# Patient Record
Sex: MALE | Race: WHITE | NOT HISPANIC OR LATINO | Employment: FULL TIME | URBAN - METROPOLITAN AREA
[De-identification: names, ages, dates, MRNs, and addresses within clinical notes are randomized per-mention and may not be internally consistent; named-entity substitution may affect disease eponyms.]

---

## 2019-07-11 ENCOUNTER — TELEPHONE (OUTPATIENT)
Dept: INFECTIOUS DISEASES | Facility: CLINIC | Age: 39
End: 2019-07-11

## 2019-07-11 ENCOUNTER — HOSPITAL ENCOUNTER (EMERGENCY)
Facility: HOSPITAL | Age: 39
Discharge: HOME | End: 2019-07-11
Attending: EMERGENCY MEDICINE
Payer: COMMERCIAL

## 2019-07-11 VITALS
BODY MASS INDEX: 25.77 KG/M2 | TEMPERATURE: 99 F | HEART RATE: 85 BPM | WEIGHT: 180 LBS | OXYGEN SATURATION: 99 % | SYSTOLIC BLOOD PRESSURE: 123 MMHG | HEIGHT: 70 IN | RESPIRATION RATE: 16 BRPM | DIASTOLIC BLOOD PRESSURE: 62 MMHG

## 2019-07-11 DIAGNOSIS — B34.9 VIRAL SYNDROME: Primary | ICD-10-CM

## 2019-07-11 DIAGNOSIS — R53.81 MALAISE: ICD-10-CM

## 2019-07-11 DIAGNOSIS — R05.9 COUGH: ICD-10-CM

## 2019-07-11 DIAGNOSIS — R25.2 MUSCLE CRAMPS: ICD-10-CM

## 2019-07-11 LAB
ALBUMIN SERPL BCP-MCNC: 4.5 G/DL (ref 3.5–5.2)
ALP SERPL-CCNC: 90 U/L (ref 55–135)
ALT SERPL W/O P-5'-P-CCNC: 43 U/L (ref 10–44)
ANION GAP SERPL CALC-SCNC: 10 MMOL/L (ref 8–16)
AST SERPL-CCNC: 38 U/L (ref 10–40)
BASOPHILS # BLD AUTO: 0.04 K/UL (ref 0–0.2)
BASOPHILS NFR BLD: 0.6 % (ref 0–1.9)
BILIRUB SERPL-MCNC: 0.5 MG/DL (ref 0.1–1)
BILIRUB UR QL STRIP: NEGATIVE
BUN SERPL-MCNC: 8 MG/DL (ref 6–20)
CALCIUM SERPL-MCNC: 9.2 MG/DL (ref 8.7–10.5)
CHLORIDE SERPL-SCNC: 103 MMOL/L (ref 95–110)
CK SERPL-CCNC: 109 U/L (ref 20–200)
CLARITY UR REFRACT.AUTO: CLEAR
CO2 SERPL-SCNC: 29 MMOL/L (ref 23–29)
COLOR UR AUTO: YELLOW
CREAT SERPL-MCNC: 0.9 MG/DL (ref 0.5–1.4)
DIFFERENTIAL METHOD: ABNORMAL
EOSINOPHIL # BLD AUTO: 0.1 K/UL (ref 0–0.5)
EOSINOPHIL NFR BLD: 0.8 % (ref 0–8)
ERYTHROCYTE [DISTWIDTH] IN BLOOD BY AUTOMATED COUNT: 11.9 % (ref 11.5–14.5)
EST. GFR  (AFRICAN AMERICAN): >60 ML/MIN/1.73 M^2
EST. GFR  (NON AFRICAN AMERICAN): >60 ML/MIN/1.73 M^2
GLUCOSE SERPL-MCNC: 104 MG/DL (ref 70–110)
GLUCOSE UR QL STRIP: NEGATIVE
HCT VFR BLD AUTO: 43 % (ref 40–54)
HGB BLD-MCNC: 14 G/DL (ref 14–18)
HGB UR QL STRIP: NEGATIVE
IMM GRANULOCYTES # BLD AUTO: 0.01 K/UL (ref 0–0.04)
IMM GRANULOCYTES NFR BLD AUTO: 0.2 % (ref 0–0.5)
KETONES UR QL STRIP: ABNORMAL
LEUKOCYTE ESTERASE UR QL STRIP: NEGATIVE
LYMPHOCYTES # BLD AUTO: 3.9 K/UL (ref 1–4.8)
LYMPHOCYTES NFR BLD: 61.4 % (ref 18–48)
MCH RBC QN AUTO: 27.7 PG (ref 27–31)
MCHC RBC AUTO-ENTMCNC: 32.6 G/DL (ref 32–36)
MCV RBC AUTO: 85 FL (ref 82–98)
MONOCYTES # BLD AUTO: 0.6 K/UL (ref 0.3–1)
MONOCYTES NFR BLD: 8.9 % (ref 4–15)
NEUTROPHILS # BLD AUTO: 1.8 K/UL (ref 1.8–7.7)
NEUTROPHILS NFR BLD: 28.1 % (ref 38–73)
NITRITE UR QL STRIP: NEGATIVE
NRBC BLD-RTO: 0 /100 WBC
PH UR STRIP: 5 [PH] (ref 5–8)
PLATELET # BLD AUTO: 138 K/UL (ref 150–350)
PMV BLD AUTO: 10.9 FL (ref 9.2–12.9)
POTASSIUM SERPL-SCNC: 3.9 MMOL/L (ref 3.5–5.1)
PROT SERPL-MCNC: 7.7 G/DL (ref 6–8.4)
PROT UR QL STRIP: NEGATIVE
RBC # BLD AUTO: 5.06 M/UL (ref 4.6–6.2)
SODIUM SERPL-SCNC: 142 MMOL/L (ref 136–145)
SP GR UR STRIP: 1.01 (ref 1–1.03)
URN SPEC COLLECT METH UR: ABNORMAL
WBC # BLD AUTO: 6.37 K/UL (ref 3.9–12.7)

## 2019-07-11 PROCEDURE — 85025 COMPLETE CBC W/AUTO DIFF WBC: CPT

## 2019-07-11 PROCEDURE — 82550 ASSAY OF CK (CPK): CPT

## 2019-07-11 PROCEDURE — 96374 THER/PROPH/DIAG INJ IV PUSH: CPT

## 2019-07-11 PROCEDURE — 93010 EKG 12-LEAD: ICD-10-PCS | Mod: ,,, | Performed by: INTERNAL MEDICINE

## 2019-07-11 PROCEDURE — 99285 EMERGENCY DEPT VISIT HI MDM: CPT | Mod: 25

## 2019-07-11 PROCEDURE — 25000003 PHARM REV CODE 250: Performed by: EMERGENCY MEDICINE

## 2019-07-11 PROCEDURE — 81003 URINALYSIS AUTO W/O SCOPE: CPT

## 2019-07-11 PROCEDURE — 96361 HYDRATE IV INFUSION ADD-ON: CPT

## 2019-07-11 PROCEDURE — 99284 PR EMERGENCY DEPT VISIT,LEVEL IV: ICD-10-PCS | Mod: ,,, | Performed by: EMERGENCY MEDICINE

## 2019-07-11 PROCEDURE — 93010 ELECTROCARDIOGRAM REPORT: CPT | Mod: ,,, | Performed by: INTERNAL MEDICINE

## 2019-07-11 PROCEDURE — 63600175 PHARM REV CODE 636 W HCPCS: Performed by: EMERGENCY MEDICINE

## 2019-07-11 PROCEDURE — 93005 ELECTROCARDIOGRAM TRACING: CPT

## 2019-07-11 PROCEDURE — 80053 COMPREHEN METABOLIC PANEL: CPT

## 2019-07-11 PROCEDURE — 99284 EMERGENCY DEPT VISIT MOD MDM: CPT | Mod: ,,, | Performed by: EMERGENCY MEDICINE

## 2019-07-11 RX ORDER — ONDANSETRON 2 MG/ML
4 INJECTION INTRAMUSCULAR; INTRAVENOUS
Status: COMPLETED | OUTPATIENT
Start: 2019-07-11 | End: 2019-07-11

## 2019-07-11 RX ADMIN — SODIUM CHLORIDE, SODIUM LACTATE, POTASSIUM CHLORIDE, AND CALCIUM CHLORIDE 1000 ML: .6; .31; .03; .02 INJECTION, SOLUTION INTRAVENOUS at 06:07

## 2019-07-11 RX ADMIN — ONDANSETRON 4 MG: 2 INJECTION INTRAMUSCULAR; INTRAVENOUS at 06:07

## 2019-07-11 NOTE — ED PROVIDER NOTES
Encounter Date: 7/11/2019       History     Chief Complaint   Patient presents with    Muscle Pain     bilateral leg muscle pain x 1 wk, today began to have muscle aching in upper back and neck. denies trauma     HPI   37 Y/O healthy male reports 1 week of general malaise, myalgias, which started to bilateral lower extremities and gradually included back and neck muscles, intermittent nonradiating frontal headaches, nasal congestion (left more than right) and nonproductive cough.  No reported sore throat, ear pain or sick contacts reported.  He denies visual changes, dizziness, if the lateral numbness or weakness, chest pain, chest pain exertion, dyspnea, dyspnea on exertion, orthopnea, or decreased exercise tolerance.  He does splinting time between normal lungs and [facility] and reports cutting the grass 7-10 days ago, but denies any insect bites or rashes. He does report 48 hr of nonbloody nonmucous loose stools versus diarrhea with no associated abdominal pain. He denies excessive flatus, abdominal distension or emesis despite slight nausea.  He has been able to tolerate p.o. despite decreased appetite in the last for 5 days.    Review of patient's allergies indicates:  No Known Allergies  History reviewed. No pertinent past medical history.  History reviewed. No pertinent surgical history.  History reviewed. No pertinent family history.  Social History     Tobacco Use    Smoking status: Never Smoker   Substance Use Topics    Alcohol use: Yes    Drug use: Never     Review of Systems  CONST: + subjective intermittent fever (T-max 100.7°), general malaise and myalgias specifically to bilateral calf muscles back and neck, which is described as worse when he feels his fevers; no chills, weight change, or fatigue.    HEENT: + nasal congestion (presents to left knee are my evaluation); + intermittent headache (none present on my evaluation), no blurry vision/change in vision, sore throat, ear  pain, eye pain, otorrhea, tooth pain, swelling, or voice changes.  NECK: No pain, masses, trauma, or redness.  HEART: No pain, palpitations, diaphoresis, nausea, or vomiting  LUNG: + dry cough; No SOB, orthopnea, TORRES or other complaints.  ABDOMEN: + nausea and nonbloody nonmucous loose stools versus diarrhea (3-4 episodes in last 2-3 days); No pain, vomiting, or flank pain  : + scrotal ache this morning; No discharge, dysuria, lesions, rashes, masses, sores  EXTREMITIES: FROM with No swelling, redness, injuries/trauma, lesions, sores, weakness, numbness, or tingling  NEURO: No dizziness, weakness, fatigue, tremors, headache, change in vision or disturbances of balance or coordination  SKIN: No insect bites, lesions, rashes, trauma or other complaints    Physical Exam     Initial Vitals [07/11/19 1628]   BP Pulse Resp Temp SpO2   126/64 91 18 98.8 °F (37.1 °C) 97 %      MAP       --         Physical Exam   PHYSICAL EXAM:  GENERAL: Calm; Cooperative; Well-appearing and Non-Toxic; Well-Nourished; NAD.  HEENT: AT/NC; anicteric sclera; PERRL, EOMI, Acuity & Fields Grossly Intact; speaking full sentences with no slurring of speech or drooling/inability to tolerate oral secretions.  NECK: Supple, FROM with no meningismus, no accessory muscle use.  HEART: Regular rate and rhythm, no M/G/T.  LUNGS: No Tachypnea, No Retractions, and CTA B/L with no W/R/R.  ABDOMEN/:  No scar; +BS, Soft, ND, NTTP. No rigidity. No guarding. NEG Emerson's, Rovsing's, or McBurney's point tenderness. + cervical in size penis with no urethral discharge; no scrotal erythema or edema with no testicular tenderness to palpation, which were vertically lying and nonedematous.  BACK: Atraumatic, No midline TTP to C/T/LS spine; No CVA tenderness B/L.  EXTREMITIES: FROM. Strength 5/5. Symmetrical Sensorium and with no deficits. Soft Comparments.  SKIN: Warm, Dry, No Skin Tears or Rashes.  VASCULAR: 2+ pulses Prox/Dist & Symmetrical with No  delay.  NEUROLOGIC: AAOx3; answering questions appropriate; no receptive or expressive aphasia; no focal neurological deficits; no resting or intentional tremors    ED Course   Procedures  Labs Reviewed   CBC W/ AUTO DIFFERENTIAL   COMPREHENSIVE METABOLIC PANEL   CK     EKG Readings: (Independently Interpreted)   Sinus rhythm at a rate of 91 beats per minute; normal ventricular axis; VT/QRS/QTC intervals within normal limits; appropriate R-wave progression with no STEMI.  ____________________  Wilder Link MD, [facility]  Emergency Medicine Staff  6:00 PM 7/11/2019         Imaging Results    None          Medical Decision Making:   History:   Old Medical Records: I decided to obtain old medical records.  Initial Assessment:   Afebrile, atraumatic and hemodynamically stable health female presents with nonspecific complaints for about 1 week, including general malaise, muscle cramps, fatigue, cough, and intermittent non persistent headaches with no associated visual changes/loss or focal numbness/weakness.  On exam patient is nontoxic, with no meningismus, no active headache and neurologically intact. Abdomen is benign and lungs clear to auscultation bilaterally. No recent travel reported.  HEENT within normal limits with no appreciated, cervical, preauricular postauricular occipital submandibular or so anterior cervical lymphadenopathy. Will place on a gown check from head to toe given his significant concern for Lyme disease.  Will additionally examined for anterior femoral lymphadenopathy and evaluate scrotum as he reports slight discomfort despite his reported being monogamous no high risk sexual encounter.  ____________________  Wilder Link MD, [facility]  Emergency Medicine Staff  5:51 PM 7/11/2019    Head to toe skin exam revealed no tick bites, or any evidence of hyperemia, maculopapular rashes, vesicular rashes, abrasions, lacerations or any evidence of erythema migrans that would indicate Lyme disease as an etiology  to patient's symptoms.  Given patient's nonspecific complaints of fever and intermittent headaches without recollection of any insect bite Robert Mountain spotted fever was also considered as part of differential diagnosis, however, as reported above no evidence of maculopapular rash reported or appreciated on my examination. Additionally, no inguinal lymphadenopathy was palpated on exam.  Note penile lesions or urethral discharge appreciated to circumcised shaft with no scrotal erythema, edema and no testicular tenderness to palpation, which were both vertically lying with intact cremasteric reflex.  ____________________  Wilder Link MD, [facility]  Emergency Medicine Staff  6:05 PM 7/11/2019    Clinical Tests:   Lab Tests: Ordered  Radiological Study: Ordered    STAFF ATTENDING PHYSICIAN F/U NOTE:  Dae Amador has been evaluated and treated. He reports complete resolution of Sx and is ready to return home. Currently patient reports no new complaints. We discussed Sx warranting immediate ED return, which were acknowledged. I recommended F/U and discussion of visit with primary care physician.  ____________________  Wilder Link MD, [facility]  Emergency Medicine Staff  9:00 PM 7/11/2019                      Clinical Impression:       ICD-10-CM ICD-9-CM   1. Viral syndrome B34.9 079.99   2. Cough R05 786.2   3. Malaise R53.81 780.79   4. Muscle cramps R25.2 729.82                                Edin Link MD  07/11/19 0956

## 2019-07-11 NOTE — ED TRIAGE NOTES
"Patient in from home for eval of bilateral muscle pain that first started in his calves and made its way up into his back and neck. Patient reports generalized weakness and states he feels fatigued and "lethargic". Patient reports he feels like his balance is off and he is uncoordinated. Patient denies any other complaints at this time.   "

## 2019-07-11 NOTE — TELEPHONE ENCOUNTER
Request to get pt an ID appt because of concerns about Lyme disease  Will ask dept to schedule  christi

## 2019-07-12 NOTE — DISCHARGE INSTRUCTIONS
X-Ray Chest PA And Lateral (Final result)   Result time 07/11/19 18:26:34   Final result by Daniel Bingham MD (07/11/19 18:26:34)                Impression:      No acute cardiopulmonary process.      Electronically signed by: Daniel Bingham MD  Date: 07/11/2019  Time: 18:26            Narrative:    EXAMINATION:  XR CHEST PA AND LATERAL    CLINICAL HISTORY:  Cough    TECHNIQUE:  PA and lateral views of the chest were performed.    COMPARISON:  None.    FINDINGS:  There is no consolidation, effusion, or pneumothorax.  Cardiomediastinal silhouette is unremarkable.  Regional osseous structures are unremarkable.                  Future Appointments   Date Time Provider Department Center   7/16/2019  1:00 PM Madhav Moody MD [facility] ID [address]

## 2019-07-15 ENCOUNTER — OFFICE VISIT (OUTPATIENT)
Dept: INTERNAL MEDICINE | Facility: CLINIC | Age: 39
End: 2019-07-15
Payer: COMMERCIAL

## 2019-07-15 ENCOUNTER — TELEPHONE (OUTPATIENT)
Dept: INFECTIOUS DISEASES | Facility: CLINIC | Age: 39
End: 2019-07-15

## 2019-07-15 ENCOUNTER — HOSPITAL ENCOUNTER (OUTPATIENT)
Dept: RADIOLOGY | Facility: HOSPITAL | Age: 39
Discharge: HOME | End: 2019-07-15
Attending: INTERNAL MEDICINE
Payer: COMMERCIAL

## 2019-07-15 VITALS
SYSTOLIC BLOOD PRESSURE: 102 MMHG | HEART RATE: 90 BPM | OXYGEN SATURATION: 99 % | BODY MASS INDEX: 25.73 KG/M2 | DIASTOLIC BLOOD PRESSURE: 68 MMHG | TEMPERATURE: 98 F | WEIGHT: 179.69 LBS | HEIGHT: 70 IN

## 2019-07-15 DIAGNOSIS — R11.2 NON-INTRACTABLE VOMITING WITH NAUSEA, UNSPECIFIED VOMITING TYPE: ICD-10-CM

## 2019-07-15 DIAGNOSIS — R19.7 DIARRHEA OF PRESUMED INFECTIOUS ORIGIN: ICD-10-CM

## 2019-07-15 DIAGNOSIS — R11.2 NAUSEA, VOMITING AND DIARRHEA: ICD-10-CM

## 2019-07-15 DIAGNOSIS — R11.2 NON-INTRACTABLE VOMITING WITH NAUSEA, UNSPECIFIED VOMITING TYPE: Primary | ICD-10-CM

## 2019-07-15 DIAGNOSIS — R42 ORTHOSTATIC LIGHTHEADEDNESS: ICD-10-CM

## 2019-07-15 DIAGNOSIS — R19.7 NAUSEA, VOMITING AND DIARRHEA: ICD-10-CM

## 2019-07-15 DIAGNOSIS — M79.10 MYALGIA: ICD-10-CM

## 2019-07-15 DIAGNOSIS — R50.9 FEVER, UNSPECIFIED FEVER CAUSE: ICD-10-CM

## 2019-07-15 PROCEDURE — 74177 CT ABD & PELVIS W/CONTRAST: CPT | Mod: 26,,, | Performed by: RADIOLOGY

## 2019-07-15 PROCEDURE — 74177 CT ABDOMEN PELVIS WITH CONTRAST: ICD-10-PCS | Mod: 26,,, | Performed by: RADIOLOGY

## 2019-07-15 PROCEDURE — 99999 PR PBB SHADOW E&M-EST. PATIENT-LVL IV: ICD-10-PCS | Mod: PBBFAC,,, | Performed by: INTERNAL MEDICINE

## 2019-07-15 PROCEDURE — 25500020 PHARM REV CODE 255: Performed by: INTERNAL MEDICINE

## 2019-07-15 PROCEDURE — 99214 OFFICE O/P EST MOD 30 MIN: CPT | Mod: S$GLB,,, | Performed by: INTERNAL MEDICINE

## 2019-07-15 PROCEDURE — 99999 PR PBB SHADOW E&M-EST. PATIENT-LVL IV: CPT | Mod: PBBFAC,,, | Performed by: INTERNAL MEDICINE

## 2019-07-15 PROCEDURE — 99214 PR OFFICE/OUTPT VISIT, EST, LEVL IV, 30-39 MIN: ICD-10-PCS | Mod: S$GLB,,, | Performed by: INTERNAL MEDICINE

## 2019-07-15 PROCEDURE — 74177 CT ABD & PELVIS W/CONTRAST: CPT | Mod: TC

## 2019-07-15 RX ORDER — METRONIDAZOLE 500 MG/1
500 TABLET ORAL EVERY 8 HOURS
Qty: 21 TABLET | Refills: 0 | Status: SHIPPED | OUTPATIENT
Start: 2019-07-15 | End: 2019-07-22

## 2019-07-15 RX ORDER — ONDANSETRON 4 MG/1
4 TABLET, FILM COATED ORAL EVERY 8 HOURS PRN
Qty: 30 TABLET | Refills: 1 | Status: SHIPPED | OUTPATIENT
Start: 2019-07-15 | End: 2020-09-17

## 2019-07-15 RX ORDER — CIPROFLOXACIN 500 MG/1
500 TABLET ORAL EVERY 12 HOURS
Qty: 14 TABLET | Refills: 0 | Status: SHIPPED | OUTPATIENT
Start: 2019-07-15 | End: 2019-07-22 | Stop reason: ALTCHOICE

## 2019-07-15 RX ADMIN — IOHEXOL 15 ML: 350 INJECTION, SOLUTION INTRAVENOUS at 03:07

## 2019-07-15 RX ADMIN — IOHEXOL 100 ML: 350 INJECTION, SOLUTION INTRAVENOUS at 05:07

## 2019-07-15 RX ADMIN — IOHEXOL 15 ML: 350 INJECTION, SOLUTION INTRAVENOUS at 04:07

## 2019-07-15 NOTE — TELEPHONE ENCOUNTER
Called back and spoke with patient's wife. Informed wife if symptoms as severe as not being able to get out of bed then should go to the ED. Patient wife stated they will keep in mind but was able to get appointment with patient PCP today at 3pm and they will go to that appointment and keep the appointment with ID for tomorrow just in case needed.

## 2019-07-15 NOTE — TELEPHONE ENCOUNTER
When calling to confirm appointment for tomorrow patient mother stated patient is very sick and needs to be seen today or earlier tomorrow. He is so sick he can't even get out of bed. Please advise.

## 2019-07-15 NOTE — PROGRESS NOTES
"INTERNAL MEDICINE SAME DAY PRIMARY CARE VISIT NOTE    Subjective:     Chief Complaint: Fatigue; Muscle Pain; Nausea; Diarrhea; and Emesis       Patient ID: Dae Amador is a 38 y.o. male with no significant PMHx, here today for focused same-day primary care visit.    Today, patient with complaint of n/v, diarrhea and weakness.  States sx first started around the 4th of July, had muscle aches and soreness in his calves and shins B.  Took Ibuprofen at that time which helped a little.  Then had sx of neck and back pain c fever ranging from 99 to 101.    For the past week has been having diarrhea described as loose stools.   Non-bloody.    Today started c emesis and nausea.  Today after throwing up, felt like he was about to faint but went to the ground but states he did not lose consciousness.  Denies abd pain.    States he had gone to Urgent Care at [facility] last week but says they did labs but did not give him results and states "they did nothing."    Also c some light and photosensitivity but no headache.    Denies sore throat or cough.  Daughter is 1.6 yo and in  s recent illness.  Denies sick contacts or recent travel.  Has never had anything like this before.    Down 5 lbs in the past 2 weeks.    Says he went to ED here last week and had labs, EKG, cxr, u/a which overall were normal x slightly low plts and slightly elevated lymphocytes.    Past Medical History:  History reviewed. No pertinent past medical history.    Home Medications:  Prior to Admission medications    Not on File       Allergies:  Review of patient's allergies indicates:  No Known Allergies    Social History:  Social History     Tobacco Use    Smoking status: Never Smoker   Substance Use Topics    Alcohol use: Yes    Drug use: Never         Review of Systems   Constitutional: Positive for appetite change, fatigue and fever. Negative for chills.   HENT: Negative for congestion, rhinorrhea and sore throat.    Respiratory: Negative for " "cough, chest tightness and shortness of breath.    Cardiovascular: Negative for chest pain.   Gastrointestinal: Positive for diarrhea, nausea and vomiting. Negative for abdominal pain and blood in stool.   Genitourinary: Negative for dysuria and frequency.   Musculoskeletal: Positive for myalgias and neck pain. Negative for neck stiffness.   Skin: Negative for rash.   Neurological: Positive for weakness and light-headedness.           Objective:   /68 (BP Location: Right arm, Patient Position: Sitting, BP Method: Medium (Manual))   Pulse 90   Temp 98.3 °F (36.8 °C)   Ht 5' 10" (1.778 m)   Wt 81.5 kg (179 lb 10.8 oz)   SpO2 99%   BMI 25.78 kg/m²        General: AAO x3, appears pale and ill-appearing  HEENT: no post nasal drip, no erythema in post pharynx, small cervical LN on L, mobile, non-tender.  CV: RRR, no m/r/g  Pulm: Lungs CTAB, no crackles, no wheezes  Abd: s/NT/ND +BS  Extremities: no c/c/e    Labs:         Assessment/Plan     Dae was seen today for fatigue, muscle pain, nausea, diarrhea and emesis.    Diagnoses and all orders for this visit:    Non-intractable vomiting with nausea, unspecified vomiting type  Diarrhea of presumed infectious origin  Nausea, vomiting and diarrhea  Fever, unspecified fever cause  Sx appear c/w c some type of viral gastroenteritis.  However, based on duration of sx and persistent intermittent fever, will tx c cipro and flagyl p obtaining stool samples today  Will also send for urgent CT abd/pelvis c contrast  Check lytes  zofran for nausea  -     CBC auto differential; Future  -     Comprehensive metabolic panel; Future  -     Hemoglobin A1c; Future  -     Magnesium; Future  -     PHOSPHORUS; Future  -     TSH; Future  -     T4, free; Future  -     Lipase; Future  -     CT Abdomen Pelvis With Contrast; Future  -     ondansetron (ZOFRAN) 4 MG tablet; Take 1 tablet (4 mg total) by mouth every 8 (eight) hours as needed for Nausea.  -     Stool culture; Future  -     " Stool Exam-Ova,Cysts,Parasites; Future  -     WBC, Stool; Future  -     ciprofloxacin HCl (CIPRO) 500 MG tablet; Take 1 tablet (500 mg total) by mouth every 12 (twelve) hours. for 7 days  -     metroNIDAZOLE (FLAGYL) 500 MG tablet; Take 1 tablet (500 mg total) by mouth every 8 (eight) hours for 7 days. Avoid alcohol while taking this medication  -     CT Abdomen Pelvis With Contrast; Future    Myalgia  -     Magnesium; Future  -     PHOSPHORUS; Future  -     Vitamin D; Future    Orthostatic lightheadedness  Discussed to increase hydration, bland diet c clears for now and advance as tolerated.    RTC in 1-2 weeks pending sx and initial w/u, advised to go back to ED if fever persists or intractable n/v.  VSS today.  Miladis Sequeira MD  Department of Internal Medicine - [facility]  07/16/2019

## 2019-07-16 ENCOUNTER — HOSPITAL ENCOUNTER (EMERGENCY)
Facility: HOSPITAL | Age: 39
Discharge: HOME | End: 2019-07-16
Attending: EMERGENCY MEDICINE
Payer: COMMERCIAL

## 2019-07-16 ENCOUNTER — TELEPHONE (OUTPATIENT)
Dept: INTERNAL MEDICINE | Facility: CLINIC | Age: 39
End: 2019-07-16

## 2019-07-16 VITALS
TEMPERATURE: 98 F | SYSTOLIC BLOOD PRESSURE: 129 MMHG | OXYGEN SATURATION: 99 % | WEIGHT: 179 LBS | HEART RATE: 93 BPM | RESPIRATION RATE: 18 BRPM | HEIGHT: 68 IN | DIASTOLIC BLOOD PRESSURE: 58 MMHG | BODY MASS INDEX: 27.13 KG/M2

## 2019-07-16 DIAGNOSIS — R19.7 NAUSEA VOMITING AND DIARRHEA: Primary | ICD-10-CM

## 2019-07-16 DIAGNOSIS — R11.2 NAUSEA VOMITING AND DIARRHEA: Primary | ICD-10-CM

## 2019-07-16 DIAGNOSIS — R42 DIZZINESS: ICD-10-CM

## 2019-07-16 LAB
ALBUMIN SERPL BCP-MCNC: 4 G/DL (ref 3.5–5.2)
ALP SERPL-CCNC: 60 U/L (ref 55–135)
ALT SERPL W/O P-5'-P-CCNC: 79 U/L (ref 10–44)
ANION GAP SERPL CALC-SCNC: 7 MMOL/L (ref 8–16)
AST SERPL-CCNC: 67 U/L (ref 10–40)
BASOPHILS # BLD AUTO: 0.03 K/UL (ref 0–0.2)
BASOPHILS NFR BLD: 0.6 % (ref 0–1.9)
BILIRUB SERPL-MCNC: 0.3 MG/DL (ref 0.1–1)
BUN SERPL-MCNC: 7 MG/DL (ref 6–20)
CALCIUM SERPL-MCNC: 8.5 MG/DL (ref 8.7–10.5)
CHLORIDE SERPL-SCNC: 104 MMOL/L (ref 95–110)
CO2 SERPL-SCNC: 29 MMOL/L (ref 23–29)
CREAT SERPL-MCNC: 1 MG/DL (ref 0.5–1.4)
DIFFERENTIAL METHOD: ABNORMAL
EOSINOPHIL # BLD AUTO: 0 K/UL (ref 0–0.5)
EOSINOPHIL NFR BLD: 0.2 % (ref 0–8)
ERYTHROCYTE [DISTWIDTH] IN BLOOD BY AUTOMATED COUNT: 11.7 % (ref 11.5–14.5)
EST. GFR  (AFRICAN AMERICAN): >60 ML/MIN/1.73 M^2
EST. GFR  (NON AFRICAN AMERICAN): >60 ML/MIN/1.73 M^2
GLUCOSE SERPL-MCNC: 111 MG/DL (ref 70–110)
HCT VFR BLD AUTO: 39.1 % (ref 40–54)
HGB BLD-MCNC: 12.6 G/DL (ref 14–18)
IMM GRANULOCYTES # BLD AUTO: 0.01 K/UL (ref 0–0.04)
IMM GRANULOCYTES NFR BLD AUTO: 0.2 % (ref 0–0.5)
LACTATE SERPL-SCNC: 1.3 MMOL/L (ref 0.5–2.2)
LYMPHOCYTES # BLD AUTO: 2.1 K/UL (ref 1–4.8)
LYMPHOCYTES NFR BLD: 39.3 % (ref 18–48)
MCH RBC QN AUTO: 27.6 PG (ref 27–31)
MCHC RBC AUTO-ENTMCNC: 32.2 G/DL (ref 32–36)
MCV RBC AUTO: 86 FL (ref 82–98)
MONOCYTES # BLD AUTO: 0.6 K/UL (ref 0.3–1)
MONOCYTES NFR BLD: 10.5 % (ref 4–15)
NEUTROPHILS # BLD AUTO: 2.6 K/UL (ref 1.8–7.7)
NEUTROPHILS NFR BLD: 49.2 % (ref 38–73)
NRBC BLD-RTO: 0 /100 WBC
PLATELET # BLD AUTO: 149 K/UL (ref 150–350)
PMV BLD AUTO: 10.7 FL (ref 9.2–12.9)
POCT GLUCOSE: 116 MG/DL (ref 70–110)
POTASSIUM SERPL-SCNC: 3.8 MMOL/L (ref 3.5–5.1)
PROT SERPL-MCNC: 6.7 G/DL (ref 6–8.4)
RBC # BLD AUTO: 4.57 M/UL (ref 4.6–6.2)
SODIUM SERPL-SCNC: 140 MMOL/L (ref 136–145)
WBC # BLD AUTO: 5.22 K/UL (ref 3.9–12.7)

## 2019-07-16 PROCEDURE — 25000003 PHARM REV CODE 250: Performed by: PHYSICIAN ASSISTANT

## 2019-07-16 PROCEDURE — 93010 ELECTROCARDIOGRAM REPORT: CPT | Mod: ,,, | Performed by: INTERNAL MEDICINE

## 2019-07-16 PROCEDURE — 96374 THER/PROPH/DIAG INJ IV PUSH: CPT

## 2019-07-16 PROCEDURE — 93005 ELECTROCARDIOGRAM TRACING: CPT

## 2019-07-16 PROCEDURE — 96361 HYDRATE IV INFUSION ADD-ON: CPT

## 2019-07-16 PROCEDURE — 99284 EMERGENCY DEPT VISIT MOD MDM: CPT | Mod: 25

## 2019-07-16 PROCEDURE — 82962 GLUCOSE BLOOD TEST: CPT

## 2019-07-16 PROCEDURE — 63600175 PHARM REV CODE 636 W HCPCS: Performed by: PHYSICIAN ASSISTANT

## 2019-07-16 PROCEDURE — 99284 PR EMERGENCY DEPT VISIT,LEVEL IV: ICD-10-PCS | Mod: ,,, | Performed by: PHYSICIAN ASSISTANT

## 2019-07-16 PROCEDURE — 85025 COMPLETE CBC W/AUTO DIFF WBC: CPT

## 2019-07-16 PROCEDURE — 93010 EKG 12-LEAD: ICD-10-PCS | Mod: ,,, | Performed by: INTERNAL MEDICINE

## 2019-07-16 PROCEDURE — 80053 COMPREHEN METABOLIC PANEL: CPT

## 2019-07-16 PROCEDURE — 99284 EMERGENCY DEPT VISIT MOD MDM: CPT | Mod: ,,, | Performed by: PHYSICIAN ASSISTANT

## 2019-07-16 PROCEDURE — 87040 BLOOD CULTURE FOR BACTERIA: CPT

## 2019-07-16 PROCEDURE — 83605 ASSAY OF LACTIC ACID: CPT

## 2019-07-16 RX ORDER — ONDANSETRON 2 MG/ML
4 INJECTION INTRAMUSCULAR; INTRAVENOUS
Status: COMPLETED | OUTPATIENT
Start: 2019-07-16 | End: 2019-07-16

## 2019-07-16 RX ORDER — METOCLOPRAMIDE 10 MG/1
10 TABLET ORAL EVERY 6 HOURS PRN
Qty: 14 TABLET | Refills: 0 | Status: SHIPPED | OUTPATIENT
Start: 2019-07-16 | End: 2019-07-22

## 2019-07-16 RX ORDER — METOCLOPRAMIDE 5 MG/1
10 TABLET ORAL
Status: COMPLETED | OUTPATIENT
Start: 2019-07-16 | End: 2019-07-16

## 2019-07-16 RX ADMIN — SODIUM CHLORIDE 1000 ML: 0.9 INJECTION, SOLUTION INTRAVENOUS at 12:07

## 2019-07-16 RX ADMIN — SODIUM CHLORIDE 1000 ML: 0.9 INJECTION, SOLUTION INTRAVENOUS at 10:07

## 2019-07-16 RX ADMIN — METOCLOPRAMIDE HYDROCHLORIDE 10 MG: 5 TABLET ORAL at 11:07

## 2019-07-16 RX ADMIN — ONDANSETRON 4 MG: 2 INJECTION INTRAMUSCULAR; INTRAVENOUS at 10:07

## 2019-07-16 NOTE — ED PROVIDER NOTES
"Encounter Date: 7/16/2019       History     Chief Complaint   Patient presents with    Dizziness     Pt reports was seen at ER thursday for same symptoms. Pt was seen at internal med yesterday. Pt reports feeling dizzy. PT reports vomiting yesterday.      9:54 AM    Patient is a 38-year-old healthy male who presents the ED with dizziness.  Patient states he has not been experiencing lightheadedness and dizziness for the past 2 days.  He states that while in the restroom yesterday, he almost passed out.  He became lightheaded and slowly brought himself down to the ground and immediately lift his legs up.  A similar episode happened again today which prompted him to come into the emergency department.  Patient reports nausea, vomiting, and diarrhea since Saturday, 3 days ago. He reports "a ton" of vomiting, may 20-30 episodes yesterday and 7 episodes of water diarrhea. He denies ever having abdominal pain. He saw his PCP yesterday who order labs, stool culture, and CTAP with contrast and placed patient on cipro and flagyl. He has had intermittent low grad fevers around 99.9 and "little over 100" and has been taking advil and tylenol. He endorses decrease appetite and poor intake.         Review of patient's allergies indicates:  No Known Allergies  No past medical history on file.  No past surgical history on file.  No family history on file.  Social History     Tobacco Use    Smoking status: Never Smoker   Substance Use Topics    Alcohol use: Yes    Drug use: Never     Review of Systems   Constitutional: Positive for appetite change and fever (low grade). Negative for diaphoresis.   HENT: Negative for sore throat.    Eyes: Negative for photophobia.   Respiratory: Negative for cough and shortness of breath.    Cardiovascular: Negative for chest pain.   Gastrointestinal: Positive for diarrhea, nausea and vomiting. Negative for abdominal pain and blood in stool.   Genitourinary: Positive for frequency. Negative for " dysuria and hematuria.   Musculoskeletal: Positive for back pain, myalgias and neck pain.   Skin: Negative for rash.   Neurological: Positive for dizziness.       Physical Exam     Initial Vitals [07/16/19 0848]   BP Pulse Resp Temp SpO2   114/67 96 18 98.1 °F (36.7 °C) 98 %      MAP       --         Physical Exam    Vitals reviewed.  Constitutional: He appears well-developed and well-nourished. He is not diaphoretic. No distress.   HENT:   Head: Normocephalic and atraumatic.   Nose: Nose normal.   Eyes: Conjunctivae and EOM are normal.   Neck: Normal range of motion.   Cardiovascular: Normal rate, regular rhythm and normal heart sounds. Exam reveals no friction rub.    No murmur heard.  Pulmonary/Chest: Breath sounds normal. No respiratory distress. He has no wheezes. He has no rales.   Abdominal: Soft. Bowel sounds are normal. He exhibits no distension and no mass. There is no tenderness. There is no rigidity, no rebound and no guarding.   Musculoskeletal: Normal range of motion.   Neurological: He is alert and oriented to person, place, and time. He has normal strength. No sensory deficit.   Skin: Skin is warm and dry. No erythema.   Psychiatric: He has a normal mood and affect. Thought content normal.         ED Course   Procedures  Labs Reviewed   CBC W/ AUTO DIFFERENTIAL - Abnormal; Notable for the following components:       Result Value    RBC 4.57 (*)     Hemoglobin 12.6 (*)     Hematocrit 39.1 (*)     Platelets 149 (*)     All other components within normal limits   COMPREHENSIVE METABOLIC PANEL - Abnormal; Notable for the following components:    Glucose 111 (*)     Calcium 8.5 (*)     AST 67 (*)     ALT 79 (*)     Anion Gap 7 (*)     All other components within normal limits   POCT GLUCOSE - Abnormal; Notable for the following components:    POCT Glucose 116 (*)     All other components within normal limits   CULTURE, BLOOD   CULTURE, BLOOD   LACTIC ACID, PLASMA        ECG Results          EKG 12-lead  (Final result)  Result time 07/17/19 00:40:24    Final result by Interface, Lab In [facility] (07/17/19 00:40:24)                 Narrative:    Test Reason : R42,    Vent. Rate : 089 BPM     Atrial Rate : 089 BPM     P-R Int : 146 ms          QRS Dur : 086 ms      QT Int : 342 ms       P-R-T Axes : 080 030 063 degrees     QTc Int : 416 ms    Normal sinus rhythm  RSR' pattern in V1 and V2  Nonspecific T wave abnormality  Abnormal ECG  When compared with ECG of 11-JUL-2019 17:57,  RSR' pattern in V1 and V2 New since previous tracing  Confirmed by Ferny Gonzales MD ([id number]) on 7/17/2019 12:40:15 AM    Referred By: [email or web address]   SELF           Confirmed By:Ferny Gonzales MD                            Imaging Results    None          Medical Decision Making:   History:   Old Medical Records: I decided to obtain old medical records.  Old Records Summarized: records from clinic visits and records from previous admission(s).       <> Summary of Records: CTAP with contrast yesterday with PCP showed apparent long segment colonic wall thickening without adjacent inflammation. He was placed on cipro and flagyl.  Initial Assessment:   Patient is a 38-year-old male that presents the ED with nausea, vomiting, and diarrhea for the past 3 days and dizziness and lightheadedness for the past 2 days.  He reports 2 episodes of presyncope.  Differential Diagnosis:   Includes but is not limited to orthostatic hypotension, vasovagal, dehydration, electrolyte abnormalities, gastroenteritis, colitis.  Clinical Tests:   Lab Tests: Ordered and Reviewed  Medical Tests: Reviewed  ED Management:  Will initiate work up, obtain blood cultures given reports of intermittent fevers and frequent anti-pyrectic use, hydrate, and continue to monitor.     ECG with NSR at 89 bpm. No STEMI.  CBC with no leukocytosis. No anemia.   CMP with no significant electrolyte abnormalities besides mild hypocalcemia at 8.5.  Patient being hydrated.  Persistent transaminitis with AST  and ALT at 67 and 79 respectively.  No hyperbilirubinemia.    Lactic acid within normal limits.  Blood cultures pending...    POCT 116.    2:12 PM. Patient reassess. He reports feeling improved after fluids and medication. Although he does note that he feels paresthesias to his bilateral plantar surfaces, which he has had intermittently for the past 1 year. I performed Neuro Exam, which did not reveal any neuro deficits. Patient and mother reassured. He was able to tolerate fluids and soup (brought by mother) by mouth. I will continue to treat patient for gastroenteritis/colitis (had CTAP with contrast yesterday that showed wall thickening of colon). He should continue cipro and flagyl as Rx by his PCP yesterday. I Rx reglan for nausea. Follow up closely with PCP. Strict ED return precaution given, and patient and his mother voiced their understanding.     7/17/19. 8:10 AM. I called patient to update him with BC results thus far which are negative. He reports feeling much improved. He only had a little diarrhea since d/c from ED. He reports increase appetite and has even had some oatmeal this morning.     I have reviewed patient's chart and discussed this case with my supervising MD.     Angela Redding PA-C  Emergent Department  [facility]  [employer] #[phone] or #[phone]      Future Appointments   Date Time Provider Department Center   7/22/2019  9:30 AM Miladis Sequeira MD [facility]   10/30/2019  8:30 AM Miladis Sequeira MD [facility]                 Attending Attestation:     Physician Attestation Statement for NP/PA:   I discussed this assessment and plan of this patient with the NP/PA, but I did not personally examine the patient. The face to face encounter was performed by the NP/PA.                     Clinical Impression:       ICD-10-CM ICD-9-CM   1. Nausea vomiting and diarrhea R11.2 787.91    R19.7 787.01   2. Dizziness R42 780.4         Disposition:   Disposition: Discharged  Condition:  Stable                        Anglea Redding PA-C  07/17/19 0808       Angela Redding PA-C  07/17/19 0811       Rey Odom MD  07/18/19 1124

## 2019-07-16 NOTE — ED TRIAGE NOTES
Pt states bilat leg pain began on July 5th.  On Saturday he began with dizziness when standing.  Pt reports N/V.  Pt states yesterday with near syncope.  Pt was standing, became dizzy, but was able to catch himself before hitting head.  Pt states that he has had blood work at [facility] and at his primary MD yesterday.  Pt also had CT scan yesterday.

## 2019-07-16 NOTE — ED NOTES
"Per VIC Redding, patient to receive additional fluid bolus before discharge. Patient reports that his "legs feel weak" and he is "faint".   "

## 2019-07-16 NOTE — ED NOTES
Spoke to Dr. Odom and VIC Redding reported that his is tolerating po challenge well. They will round shortly to plan discharge.

## 2019-07-16 NOTE — ED NOTES
LOC: The patient is awake, alert and aware of environment with an appropriate affect, the patient is oriented x 3 and speaking appropriately.  APPEARANCE: Patient resting comfortably and in no acute distress, patient is clean and well groomed, patient's clothing is properly fastened.  SKIN: The skin is warm and dry, color consistent with ethnicity, patient has normal skin turgor and moist mucus membranes, skin intact, no breakdown or bruising noted.  MUSCULOSKELETAL: Patient moving all extremities spontaneously, no obvious swelling or deformities noted.  RESPIRATORY: Airway is open and patent, respirations are spontaneous, patient has a normal effort and rate, no accessory muscle use noted.  ABDOMEN: Soft and non tender to palpation, no distention noted, normoactive bowel sounds present in all four quadrants.

## 2019-07-16 NOTE — DISCHARGE INSTRUCTIONS
Future Appointments   Date Time Provider Department Center   7/22/2019  9:30 AM MD [facility]   10/30/2019  8:30 AM MD [facility]       Our goal in the emergency department is to always give you outstanding care and exceptional service. You may receive a survey by mail or e-mail in the next week regarding your experience in our ED. We would greatly appreciate your completing and returning the survey. Your feedback provides us with a way to recognize our staff who give very good care and it helps us learn how to improve when your experience was below our aspiration of excellence.

## 2019-07-21 LAB
BACTERIA BLD CULT: NORMAL
BACTERIA BLD CULT: NORMAL

## 2019-07-22 ENCOUNTER — OFFICE VISIT (OUTPATIENT)
Dept: INTERNAL MEDICINE | Facility: CLINIC | Age: 39
End: 2019-07-22
Payer: COMMERCIAL

## 2019-07-22 VITALS
DIASTOLIC BLOOD PRESSURE: 64 MMHG | BODY MASS INDEX: 26.3 KG/M2 | HEIGHT: 68 IN | WEIGHT: 173.5 LBS | SYSTOLIC BLOOD PRESSURE: 116 MMHG | HEART RATE: 92 BPM | OXYGEN SATURATION: 98 %

## 2019-07-22 DIAGNOSIS — Z00.00 VISIT FOR ANNUAL HEALTH EXAMINATION: Primary | ICD-10-CM

## 2019-07-22 DIAGNOSIS — E55.9 VITAMIN D DEFICIENCY: ICD-10-CM

## 2019-07-22 DIAGNOSIS — K52.9 ACUTE GASTROENTERITIS: ICD-10-CM

## 2019-07-22 DIAGNOSIS — R93.2 ABNORMAL CT OF LIVER: ICD-10-CM

## 2019-07-22 DIAGNOSIS — D64.9 ANEMIA, UNSPECIFIED TYPE: ICD-10-CM

## 2019-07-22 DIAGNOSIS — R79.89 ELEVATED LFTS: ICD-10-CM

## 2019-07-22 PROCEDURE — 99999 PR PBB SHADOW E&M-EST. PATIENT-LVL IV: ICD-10-PCS | Mod: PBBFAC,,, | Performed by: INTERNAL MEDICINE

## 2019-07-22 PROCEDURE — 99395 PREV VISIT EST AGE 18-39: CPT | Mod: S$GLB,,, | Performed by: INTERNAL MEDICINE

## 2019-07-22 PROCEDURE — 99395 PR PREVENTIVE VISIT,EST,18-39: ICD-10-PCS | Mod: S$GLB,,, | Performed by: INTERNAL MEDICINE

## 2019-07-22 PROCEDURE — 99999 PR PBB SHADOW E&M-EST. PATIENT-LVL IV: CPT | Mod: PBBFAC,,, | Performed by: INTERNAL MEDICINE

## 2019-07-22 NOTE — PROGRESS NOTES
INTERNAL MEDICINE INITIAL VISIT NOTE      CHIEF COMPLAINT     Chief Complaint   Patient presents with    Follow-up     dizziness and nausea   Annual exam    VALENTINE Amador is a 38 y.o.  male who presents with no significant PMHx, here today to establish care, annual exam and f/u of sx from last week.    Was seen by me last week for the first time for a same day appt for n/v, diarrhea, myalgias, and dizziness/weakness (see last note for details of sx).    At that time, labs and CT abd ordered and treated empirically for GI sx c Cipro and Flagyl as pt had been having n/v, diarrhea, and fever.    CT abd/pelvis c/w colitis but also c incidental findings of:  Small hypoattenuating focus in the liver, too small to characterize.  Spleen mildly enlarged.  Up to 50% narrowing and J-shaped configuration at proximal celiac axis.  RML c 2 solid pulmonary micronodules, no routine f/u for low-risk patient.    Was seen in the ED one day after seeing me and given IVFs which helped and was told to complete course of abx rx'ed by me.  Furthermore, labs done by me and in ED c new anemia and elevated LFTs.  Stool testing neg.  Vit D marginally low at 23.    Says he completed Cipro this morning but not quite done c Flagyl due to missing a couple of doses.  Diarrhea resolved.  Still c some nausea and feeling a little weak at times but appetite is slowly improving.  Denies abd pain or blood in stools.    Past Medical History:  History reviewed. No pertinent past medical history.    Past Surgical History:  Past Surgical History:   Procedure Laterality Date    FINGER FRACTURE SURGERY Right     index finger, flag football injury       Home Medications:  Prior to Admission medications    Medication Sig Start Date End Date Taking? Authorizing Provider   ciprofloxacin HCl (CIPRO) 500 MG tablet Take 1 tablet (500 mg total) by mouth every 12 (twelve) hours. for 7 days 7/15/19 7/22/19  Miladis Sequeira MD   metoclopramide HCl (REGLAN)  "10 MG tablet Take 1 tablet (10 mg total) by mouth every 6 (six) hours as needed. 7/16/19   Angela Redding PA-C   metroNIDAZOLE (FLAGYL) 500 MG tablet Take 1 tablet (500 mg total) by mouth every 8 (eight) hours for 7 days. Avoid alcohol while taking this medication 7/15/19 7/22/19  Miladis Sequeira MD   ondansetron (ZOFRAN) 4 MG tablet Take 1 tablet (4 mg total) by mouth every 8 (eight) hours as needed for Nausea. 7/15/19   Miladis Sequeira MD       Allergies:  Review of patient's allergies indicates:  No Known Allergies    Family History:  Family History   Problem Relation Age of Onset    No Known Problems Mother     No Known Problems Father     Cancer Maternal Grandmother         unknown details    Melanoma Maternal Grandfather     Diabetes Paternal Grandmother     No Known Problems Paternal Grandfather        Social History:  Social History     Tobacco Use    Smoking status: Never Smoker    Smokeless tobacco: Never Used   Substance Use Topics    Alcohol use: Yes    Drug use: Never       Review of Systems:  Review of Systems   Constitutional: Negative for chills, fatigue and fever.   Respiratory: Negative for cough, chest tightness and shortness of breath.    Cardiovascular: Negative for chest pain.   Gastrointestinal: Positive for nausea. Negative for abdominal pain, anal bleeding, blood in stool, diarrhea and vomiting.   Genitourinary: Negative for dysuria and frequency.   Neurological: Positive for weakness and light-headedness. Negative for syncope (none since prior episode).       Health Maintenance:   Immunizations:   Influenza rec this Fall  TDap 2017 at [facility]  Prevnar 13 rec at 66  Shingrix rec once back in stock    Cancer Screening:  Colonoscopy: rec at 51 unless anemia persists and depending on w/u    PHYSICAL EXAM     /64   Pulse 92   Ht 5' 8" (1.727 m)   Wt 78.7 kg (173 lb 8 oz)   SpO2 98%   BMI 26.38 kg/m²     GEN - A+OX4, NAD   HEENT - PERRL, EOMI, OP clear  Neck - small mobile LN noted on " cervical.  Thyroid appears normal in size.  CV - RRR, no m/r/g  Chest - CTAB, no wheezing, crackles, or rhonchi  Abd - S/NT/ND/+BS.   Ext - 2+BDP. No C/C/E.  LN - No LAD appreciated.  Skin - Normal color and texture, no rash, no skin lesions.      LABS     Lab Results   Component Value Date    WBC 5.22 07/16/2019    HGB 12.6 (L) 07/16/2019    HCT 39.1 (L) 07/16/2019    MCV 86 07/16/2019     (L) 07/16/2019     CMP  Sodium   Date Value Ref Range Status   07/16/2019 140 136 - 145 mmol/L Final     Potassium   Date Value Ref Range Status   07/16/2019 3.8 3.5 - 5.1 mmol/L Final     Chloride   Date Value Ref Range Status   07/16/2019 104 95 - 110 mmol/L Final     CO2   Date Value Ref Range Status   07/16/2019 29 23 - 29 mmol/L Final     Glucose   Date Value Ref Range Status   07/16/2019 111 (H) 70 - 110 mg/dL Final     BUN, Bld   Date Value Ref Range Status   07/16/2019 7 6 - 20 mg/dL Final     Creatinine   Date Value Ref Range Status   07/16/2019 1.0 0.5 - 1.4 mg/dL Final     Calcium   Date Value Ref Range Status   07/16/2019 8.5 (L) 8.7 - 10.5 mg/dL Final     Total Protein   Date Value Ref Range Status   07/16/2019 6.7 6.0 - 8.4 g/dL Final     Albumin   Date Value Ref Range Status   07/16/2019 4.0 3.5 - 5.2 g/dL Final     Total Bilirubin   Date Value Ref Range Status   07/16/2019 0.3 0.1 - 1.0 mg/dL Final     Comment:     For infants and newborns, interpretation of results should be based  on gestational age, weight and in agreement with clinical  observations.  Premature Infant recommended reference ranges:  Up to 24 hours.............<8.0 mg/dL  Up to 48 hours............<12.0 mg/dL  3-5 days..................<15.0 mg/dL  6-29 days.................<15.0 mg/dL       Alkaline Phosphatase   Date Value Ref Range Status   07/16/2019 60 55 - 135 U/L Final     AST   Date Value Ref Range Status   07/16/2019 67 (H) 10 - 40 U/L Final     ALT   Date Value Ref Range Status   07/16/2019 79 (H) 10 - 44 U/L Final     Anion Gap    Date Value Ref Range Status   07/16/2019 7 (L) 8 - 16 mmol/L Final     eGFR if    Date Value Ref Range Status   07/16/2019 >60.0 >60 mL/min/1.73 m^2 Final     eGFR if non    Date Value Ref Range Status   07/16/2019 >60.0 >60 mL/min/1.73 m^2 Final     Comment:     Calculation used to obtain the estimated glomerular filtration  rate (eGFR) is the CKD-EPI equation.        No results found for: IRON, TIBC, FERRITIN, SATURATEDIRO  Lab Results   Component Value Date    HGBA1C 5.3 07/15/2019     Lab Results   Component Value Date    TSH 0.790 07/15/2019     Lab Results   Component Value Date    LDLCALC 58.2 (L) 05/16/2006         ASSESSMENT/PLAN     Dae Amador is a 38 y.o. male with  Dae was seen today for follow-up.    Diagnoses and all orders for this visit:    Visit for annual health examination  History and physical exam completed.  Health maintenance reviewed as above.  -     Lipid panel; Future; Expected date: 07/22/2019    Acute gastroenteritis  As per HPI  tx'ed c colitis c cipro and flagyl and almost completed course  Overall sx have improved but not completely resolved, will see if nausea is better once off all abx, rec otc probiotics for sx.    Elevated LFTs  As per HPI  Suspect due to acute illness as labs had previously been normal  Gallbladder normal, ?liver lesion, see below for mgmt.  Will repeat labs now  -     Comprehensive metabolic panel; Future; Expected date: 07/22/2019    Anemia, unspecified type  As per HPI  Will check labs c iron studies.  -     CBC auto differential; Future; Expected date: 07/22/2019    Vitamin D deficiency  Mild, likely due to recent poor intake.  Will repeat at f/u appt, rec MVI for now.    Lung nodule < 6cm on CT  As per HPI  Per guidelines, does not req f/u imaging if low risk but pt states lots of 2nd hand exposure from his mom growing up so will plan for CT chest in one year    Abnormal CT of liver  As per HPI  Will check abd u/s to  further assess  -     US Abdomen Complete; Future; Expected date: 07/22/2019      HM as above    RTC in 3 months, sooner if needed depending on sx and depending on labs.    Miladis Sequeira MD  Department of Internal Medicine - [facility]  07/22/2019

## 2019-07-23 ENCOUNTER — LAB VISIT (OUTPATIENT)
Dept: LAB | Facility: HOSPITAL | Age: 39
End: 2019-07-23
Attending: INTERNAL MEDICINE
Payer: COMMERCIAL

## 2019-07-23 DIAGNOSIS — D64.9 ANEMIA, UNSPECIFIED TYPE: ICD-10-CM

## 2019-07-23 DIAGNOSIS — Z00.00 VISIT FOR ANNUAL HEALTH EXAMINATION: ICD-10-CM

## 2019-07-23 DIAGNOSIS — R79.89 ELEVATED LFTS: ICD-10-CM

## 2019-07-23 LAB
ALBUMIN SERPL BCP-MCNC: 4.2 G/DL (ref 3.5–5.2)
ALP SERPL-CCNC: 56 U/L (ref 55–135)
ALT SERPL W/O P-5'-P-CCNC: 127 U/L (ref 10–44)
ANION GAP SERPL CALC-SCNC: 8 MMOL/L (ref 8–16)
AST SERPL-CCNC: 72 U/L (ref 10–40)
BASOPHILS # BLD AUTO: 0.02 K/UL (ref 0–0.2)
BASOPHILS NFR BLD: 0.4 % (ref 0–1.9)
BILIRUB SERPL-MCNC: 0.4 MG/DL (ref 0.1–1)
BUN SERPL-MCNC: 14 MG/DL (ref 6–20)
CALCIUM SERPL-MCNC: 9.1 MG/DL (ref 8.7–10.5)
CHLORIDE SERPL-SCNC: 103 MMOL/L (ref 95–110)
CHOLEST SERPL-MCNC: 114 MG/DL (ref 120–199)
CHOLEST/HDLC SERPL: 4.4 {RATIO} (ref 2–5)
CO2 SERPL-SCNC: 27 MMOL/L (ref 23–29)
CREAT SERPL-MCNC: 1 MG/DL (ref 0.5–1.4)
DIFFERENTIAL METHOD: ABNORMAL
EOSINOPHIL # BLD AUTO: 0.1 K/UL (ref 0–0.5)
EOSINOPHIL NFR BLD: 1.1 % (ref 0–8)
ERYTHROCYTE [DISTWIDTH] IN BLOOD BY AUTOMATED COUNT: 12.2 % (ref 11.5–14.5)
EST. GFR  (AFRICAN AMERICAN): >60 ML/MIN/1.73 M^2
EST. GFR  (NON AFRICAN AMERICAN): >60 ML/MIN/1.73 M^2
FERRITIN SERPL-MCNC: 368 NG/ML (ref 20–300)
GLUCOSE SERPL-MCNC: 93 MG/DL (ref 70–110)
HCT VFR BLD AUTO: 39.7 % (ref 40–54)
HDLC SERPL-MCNC: 26 MG/DL (ref 40–75)
HDLC SERPL: 22.8 % (ref 20–50)
HGB BLD-MCNC: 13 G/DL (ref 14–18)
IRON SERPL-MCNC: 85 UG/DL (ref 45–160)
LDLC SERPL CALC-MCNC: 70.6 MG/DL (ref 63–159)
LYMPHOCYTES # BLD AUTO: 2.6 K/UL (ref 1–4.8)
LYMPHOCYTES NFR BLD: 53.7 % (ref 18–48)
MCH RBC QN AUTO: 27.5 PG (ref 27–31)
MCHC RBC AUTO-ENTMCNC: 32.7 G/DL (ref 32–36)
MCV RBC AUTO: 84 FL (ref 82–98)
MONOCYTES # BLD AUTO: 0.5 K/UL (ref 0.3–1)
MONOCYTES NFR BLD: 10.7 % (ref 4–15)
NEUTROPHILS # BLD AUTO: 1.6 K/UL (ref 1.8–7.7)
NEUTROPHILS NFR BLD: 34.1 % (ref 38–73)
NONHDLC SERPL-MCNC: 88 MG/DL
PLATELET # BLD AUTO: 155 K/UL (ref 150–350)
PMV BLD AUTO: 11.1 FL (ref 9.2–12.9)
POTASSIUM SERPL-SCNC: 4.4 MMOL/L (ref 3.5–5.1)
PROT SERPL-MCNC: 6.9 G/DL (ref 6–8.4)
RBC # BLD AUTO: 4.72 M/UL (ref 4.6–6.2)
SATURATED IRON: 31 % (ref 20–50)
SODIUM SERPL-SCNC: 138 MMOL/L (ref 136–145)
TOTAL IRON BINDING CAPACITY: 277 UG/DL (ref 250–450)
TRANSFERRIN SERPL-MCNC: 187 MG/DL (ref 200–375)
TRIGL SERPL-MCNC: 87 MG/DL (ref 30–150)
WBC # BLD AUTO: 4.75 K/UL (ref 3.9–12.7)

## 2019-07-23 PROCEDURE — 80053 COMPREHEN METABOLIC PANEL: CPT

## 2019-07-23 PROCEDURE — 82728 ASSAY OF FERRITIN: CPT

## 2019-07-23 PROCEDURE — 36415 COLL VENOUS BLD VENIPUNCTURE: CPT

## 2019-07-23 PROCEDURE — 85025 COMPLETE CBC W/AUTO DIFF WBC: CPT

## 2019-07-23 PROCEDURE — 80061 LIPID PANEL: CPT

## 2019-07-23 PROCEDURE — 83540 ASSAY OF IRON: CPT

## 2019-07-25 ENCOUNTER — TELEPHONE (OUTPATIENT)
Dept: INTERNAL MEDICINE | Facility: CLINIC | Age: 39
End: 2019-07-25

## 2019-07-25 DIAGNOSIS — R79.89 ELEVATED LFTS: Primary | ICD-10-CM

## 2019-07-25 NOTE — TELEPHONE ENCOUNTER
Spoke to pt regarding recent labs.  LFTs still trending up.  Has u/s appt on Monday to assess abnormality from recent CT.  Suspect due to recent illness, clinically improving.  Will repeat labs in 2 weeks.  Anemia c/w aocd and marginally better.  Chol normal.

## 2019-07-26 ENCOUNTER — HOSPITAL ENCOUNTER (OUTPATIENT)
Facility: HOSPITAL | Age: 39
Discharge: HOME | End: 2019-07-27
Attending: EMERGENCY MEDICINE | Admitting: PSYCHIATRY & NEUROLOGY
Payer: COMMERCIAL

## 2019-07-26 ENCOUNTER — TELEPHONE (OUTPATIENT)
Dept: INTERNAL MEDICINE | Facility: CLINIC | Age: 39
End: 2019-07-26

## 2019-07-26 DIAGNOSIS — R47.01 APHASIA: Primary | ICD-10-CM

## 2019-07-26 DIAGNOSIS — I63.9 STROKE: ICD-10-CM

## 2019-07-26 LAB
ALBUMIN SERPL BCP-MCNC: 3.9 G/DL (ref 3.5–5.2)
ALP SERPL-CCNC: 60 U/L (ref 55–135)
ALT SERPL W/O P-5'-P-CCNC: 94 U/L (ref 10–44)
ANION GAP SERPL CALC-SCNC: 8 MMOL/L (ref 8–16)
AST SERPL-CCNC: 46 U/L (ref 10–40)
BASOPHILS # BLD AUTO: 0.04 K/UL (ref 0–0.2)
BASOPHILS NFR BLD: 0.6 % (ref 0–1.9)
BILIRUB SERPL-MCNC: 0.3 MG/DL (ref 0.1–1)
BILIRUB UR QL STRIP: NEGATIVE
BUN SERPL-MCNC: 10 MG/DL (ref 6–20)
CALCIUM SERPL-MCNC: 8.5 MG/DL (ref 8.7–10.5)
CHLORIDE SERPL-SCNC: 104 MMOL/L (ref 95–110)
CHOLEST SERPL-MCNC: 120 MG/DL (ref 120–199)
CHOLEST/HDLC SERPL: 4.6 {RATIO} (ref 2–5)
CLARITY UR REFRACT.AUTO: CLEAR
CO2 SERPL-SCNC: 27 MMOL/L (ref 23–29)
COLOR UR AUTO: YELLOW
CREAT SERPL-MCNC: 0.9 MG/DL (ref 0.5–1.4)
CREAT SERPL-MCNC: 0.9 MG/DL (ref 0.5–1.4)
CRP SERPL-MCNC: 0.7 MG/L (ref 0–3.19)
DIFFERENTIAL METHOD: ABNORMAL
EOSINOPHIL # BLD AUTO: 0 K/UL (ref 0–0.5)
EOSINOPHIL NFR BLD: 0.6 % (ref 0–8)
ERYTHROCYTE [DISTWIDTH] IN BLOOD BY AUTOMATED COUNT: 12 % (ref 11.5–14.5)
ERYTHROCYTE [SEDIMENTATION RATE] IN BLOOD BY WESTERGREN METHOD: <2 MM/HR (ref 0–23)
EST. GFR  (AFRICAN AMERICAN): >60 ML/MIN/1.73 M^2
EST. GFR  (NON AFRICAN AMERICAN): >60 ML/MIN/1.73 M^2
ESTIMATED AVG GLUCOSE: 108 MG/DL (ref 68–131)
GLUCOSE SERPL-MCNC: 118 MG/DL (ref 70–110)
GLUCOSE UR QL STRIP: NEGATIVE
HBA1C MFR BLD HPLC: 5.4 % (ref 4–5.6)
HCT VFR BLD AUTO: 38.7 % (ref 40–54)
HDLC SERPL-MCNC: 26 MG/DL (ref 40–75)
HDLC SERPL: 21.7 % (ref 20–50)
HGB BLD-MCNC: 12.8 G/DL (ref 14–18)
HGB UR QL STRIP: NEGATIVE
IMM GRANULOCYTES # BLD AUTO: 0 K/UL (ref 0–0.04)
IMM GRANULOCYTES NFR BLD AUTO: 0 % (ref 0–0.5)
INR PPP: 1.3 (ref 0.8–1.2)
KETONES UR QL STRIP: NEGATIVE
LDLC SERPL CALC-MCNC: 75.4 MG/DL (ref 63–159)
LEUKOCYTE ESTERASE UR QL STRIP: NEGATIVE
LYMPHOCYTES # BLD AUTO: 3.8 K/UL (ref 1–4.8)
LYMPHOCYTES NFR BLD: 59.1 % (ref 18–48)
MCH RBC QN AUTO: 27.7 PG (ref 27–31)
MCHC RBC AUTO-ENTMCNC: 33.1 G/DL (ref 32–36)
MCV RBC AUTO: 84 FL (ref 82–98)
MONOCYTES # BLD AUTO: 0.7 K/UL (ref 0.3–1)
MONOCYTES NFR BLD: 10.3 % (ref 4–15)
NEUTROPHILS # BLD AUTO: 1.9 K/UL (ref 1.8–7.7)
NEUTROPHILS NFR BLD: 29.4 % (ref 38–73)
NITRITE UR QL STRIP: NEGATIVE
NONHDLC SERPL-MCNC: 94 MG/DL
NRBC BLD-RTO: 0 /100 WBC
PH UR STRIP: 6 [PH] (ref 5–8)
PLATELET # BLD AUTO: 150 K/UL (ref 150–350)
PMV BLD AUTO: 11.5 FL (ref 9.2–12.9)
POC PTINR: 1 (ref 0.9–1.2)
POC PTWBT: 11.9 SEC (ref 9.7–14.3)
POCT GLUCOSE: 79 MG/DL (ref 70–110)
POTASSIUM SERPL-SCNC: 3.6 MMOL/L (ref 3.5–5.1)
PROT SERPL-MCNC: 6.4 G/DL (ref 6–8.4)
PROT UR QL STRIP: NEGATIVE
PROTHROMBIN TIME: 13.1 SEC (ref 9–12.5)
RBC # BLD AUTO: 4.62 M/UL (ref 4.6–6.2)
SAMPLE: NORMAL
SAMPLE: NORMAL
SODIUM SERPL-SCNC: 139 MMOL/L (ref 136–145)
SP GR UR STRIP: 1.01 (ref 1–1.03)
TRIGL SERPL-MCNC: 93 MG/DL (ref 30–150)
TSH SERPL DL<=0.005 MIU/L-ACNC: 1.32 UIU/ML (ref 0.4–4)
URN SPEC COLLECT METH UR: NORMAL
WBC # BLD AUTO: 6.4 K/UL (ref 3.9–12.7)

## 2019-07-26 PROCEDURE — 99291 PR CRITICAL CARE, E/M 30-74 MINUTES: ICD-10-PCS | Mod: ,,, | Performed by: EMERGENCY MEDICINE

## 2019-07-26 PROCEDURE — 80053 COMPREHEN METABOLIC PANEL: CPT

## 2019-07-26 PROCEDURE — 85610 PROTHROMBIN TIME: CPT

## 2019-07-26 PROCEDURE — 93010 ELECTROCARDIOGRAM REPORT: CPT | Mod: ,,, | Performed by: INTERNAL MEDICINE

## 2019-07-26 PROCEDURE — 99291 CRITICAL CARE FIRST HOUR: CPT | Mod: ,,, | Performed by: EMERGENCY MEDICINE

## 2019-07-26 PROCEDURE — 84443 ASSAY THYROID STIM HORMONE: CPT

## 2019-07-26 PROCEDURE — 93005 ELECTROCARDIOGRAM TRACING: CPT

## 2019-07-26 PROCEDURE — 85025 COMPLETE CBC W/AUTO DIFF WBC: CPT

## 2019-07-26 PROCEDURE — 99218 PR INITIAL OBSERVATION CARE,LEVL I: CPT | Mod: ,,, | Performed by: PSYCHIATRY & NEUROLOGY

## 2019-07-26 PROCEDURE — 99218 PR INITIAL OBSERVATION CARE,LEVL I: ICD-10-PCS | Mod: ,,, | Performed by: PSYCHIATRY & NEUROLOGY

## 2019-07-26 PROCEDURE — 83036 HEMOGLOBIN GLYCOSYLATED A1C: CPT

## 2019-07-26 PROCEDURE — 99285 EMERGENCY DEPT VISIT HI MDM: CPT | Mod: 25

## 2019-07-26 PROCEDURE — 93010 EKG 12-LEAD: ICD-10-PCS | Mod: ,,, | Performed by: INTERNAL MEDICINE

## 2019-07-26 PROCEDURE — 82962 GLUCOSE BLOOD TEST: CPT

## 2019-07-26 PROCEDURE — G0378 HOSPITAL OBSERVATION PER HR: HCPCS

## 2019-07-26 PROCEDURE — 81003 URINALYSIS AUTO W/O SCOPE: CPT

## 2019-07-26 PROCEDURE — 85652 RBC SED RATE AUTOMATED: CPT

## 2019-07-26 PROCEDURE — 82565 ASSAY OF CREATININE: CPT

## 2019-07-26 PROCEDURE — 36415 COLL VENOUS BLD VENIPUNCTURE: CPT

## 2019-07-26 PROCEDURE — 80061 LIPID PANEL: CPT

## 2019-07-26 PROCEDURE — 86141 C-REACTIVE PROTEIN HS: CPT

## 2019-07-26 RX ORDER — SODIUM CHLORIDE 0.9 % (FLUSH) 0.9 %
10 SYRINGE (ML) INJECTION
Status: DISCONTINUED | OUTPATIENT
Start: 2019-07-26 | End: 2019-07-27 | Stop reason: HOSPADM

## 2019-07-26 RX ORDER — LABETALOL HYDROCHLORIDE 5 MG/ML
10 INJECTION, SOLUTION INTRAVENOUS
Status: DISCONTINUED | OUTPATIENT
Start: 2019-07-26 | End: 2019-07-27 | Stop reason: HOSPADM

## 2019-07-26 RX ORDER — ENOXAPARIN SODIUM 100 MG/ML
40 INJECTION SUBCUTANEOUS EVERY 24 HOURS
Status: DISCONTINUED | OUTPATIENT
Start: 2019-07-26 | End: 2019-07-27 | Stop reason: HOSPADM

## 2019-07-26 RX ORDER — POLYETHYLENE GLYCOL 3350 17 G/17G
17 POWDER, FOR SOLUTION ORAL DAILY PRN
Status: DISCONTINUED | OUTPATIENT
Start: 2019-07-26 | End: 2019-07-27 | Stop reason: HOSPADM

## 2019-07-26 RX ORDER — ONDANSETRON 8 MG/1
8 TABLET, ORALLY DISINTEGRATING ORAL EVERY 8 HOURS PRN
Status: DISCONTINUED | OUTPATIENT
Start: 2019-07-26 | End: 2019-07-27 | Stop reason: HOSPADM

## 2019-07-26 RX ORDER — ASPIRIN 81 MG/1
81 TABLET ORAL DAILY
Status: DISCONTINUED | OUTPATIENT
Start: 2019-07-26 | End: 2019-07-27 | Stop reason: HOSPADM

## 2019-07-26 RX ORDER — ATORVASTATIN CALCIUM 20 MG/1
20 TABLET, FILM COATED ORAL DAILY
Status: DISCONTINUED | OUTPATIENT
Start: 2019-07-26 | End: 2019-07-27 | Stop reason: HOSPADM

## 2019-07-26 NOTE — PROGRESS NOTES
MRI done pt completed and moved back to stretcher / monitor removed and pt to portable monitor and return to ED by nurse

## 2019-07-26 NOTE — SUBJECTIVE & OBJECTIVE
No past medical history on file.  Past Surgical History:   Procedure Laterality Date    FINGER FRACTURE SURGERY Right     index finger, flag football injury     Family History   Problem Relation Age of Onset    No Known Problems Mother     No Known Problems Father     Cancer Maternal Grandmother         unknown details    Melanoma Maternal Grandfather     Diabetes Paternal Grandmother     No Known Problems Paternal Grandfather      Social History     Tobacco Use    Smoking status: Never Smoker    Smokeless tobacco: Never Used   Substance Use Topics    Alcohol use: Yes    Drug use: Never     Review of patient's allergies indicates:  No Known Allergies    Medications: I have reviewed the current medication administration record.      (Not in a hospital admission)    Review of Systems   Constitutional: Negative for chills and fever.   HENT: Negative for trouble swallowing.    Eyes: Negative for visual disturbance.   Respiratory: Negative for shortness of breath.    Cardiovascular: Negative for chest pain.   Neurological: Positive for speech difficulty and numbness. Negative for facial asymmetry and weakness.   Psychiatric/Behavioral: Positive for confusion. Negative for agitation.     Objective:     Vital Signs (Most Recent):  Temp: 98.3 °F (36.8 °C) (07/26/19 1302)  Pulse: 96 (07/26/19 1310)  Resp: 15 (07/26/19 1302)  BP: 125/75 (07/26/19 1310)  SpO2: 98 % (07/26/19 1310)    Vital Signs Range (Last 24H):  Temp:  [98.3 °F (36.8 °C)]   Pulse:  []   Resp:  [15]   BP: (122-125)/(75-76)   SpO2:  [98 %-100 %]     Physical Exam   Constitutional: He is oriented to person, place, and time. No distress.   Eyes: Pupils are equal, round, and reactive to light. EOM are normal.   Cardiovascular: Normal rate.   Pulmonary/Chest: Effort normal.   Abdominal: Soft.   Neurological: He is alert and oriented to person, place, and time.   Nursing note and vitals reviewed.      Neurological Exam:   LOC: alert  Language:  Difficulty following 2 step commands. Noted acalculia and apraxia. Appeared frustrated at not being able to perform learned motor tasks.  Articulation: No dysarthria  Orientation: Person, Place, Time   Visual Fields: Full  EOM (CN III, IV, VI): Full/intact  Facial Sensation (CN V): Normal  Facial Movement (CN VII): Symmetric facial expression    Motor: Arm left  Normal 5/5  Leg left  Normal 5/5  Arm right  Normal 5/5  Leg right Normal 5/5  Cebellar: No evidence of appendicular or axial ataxia  Sensation: Intact to light touch, temperature and vibration      Laboratory:  CMP: No results for input(s): GLUCOSE, CALCIUM, ALBUMIN, PROT, NA, K, CO2, CL, BUN, CREATININE, ALKPHOS, ALT, AST, BILITOT in the last 24 hours.  CBC:   Recent Labs   Lab 07/26/19  1308   WBC 6.40   RBC 4.62   HGB 12.8*   HCT 38.7*      MCV 84   MCH 27.7   MCHC 33.1     Lipid Panel:   Recent Labs   Lab 07/23/19  0709   CHOL 114*   LDLCALC 70.6   HDL 26*   TRIG 87     Coagulation:   Recent Labs   Lab 07/26/19  1308   INR 1.3*     Hgb A1C: No results for input(s): HGBA1C in the last 168 hours.  TSH: No results for input(s): TSH in the last 168 hours.    Diagnostic Results:      Brain/Vessel imaging:    MRI Ischemic Protocol 7/25/19 -     No evidence of acute infarct or stenosis.    CT Head 7/26/19 -     There is no evidence of acute major vascular territory infarct, hemorrhage, or mass.  There is no hydrocephalus.  There are no abnormal extra-axial fluid collections.  There are a few punctate foci of hypoattenuation within the periphery of the inferior most left occipital lobe, noting these are in a region of artifact and best visualized on the thin cut images.  Findings are nonspecific.  The paranasal sinuses and mastoid air cells are clear, and there is no evidence of calvarial fracture.  The visualized soft tissues are unremarkable.    Cardiac Evaluation:     TTE pending

## 2019-07-26 NOTE — ED NOTES
Hourly rounding complete. Patient resting in stretcher and is in NAD at this time. Pt is awake alert and oriented x4, VSS. Pt denies pain at this time. Pt updated on POC. Bed low and locked, SR up x2, call bell in patient reach. Pt remains on continuous cardiac monitor, continuous pulse ox, and auto BP cuff. Pt voices no needs at this time.

## 2019-07-26 NOTE — ED TRIAGE NOTES
Dae Amador, a 39 y.o. male presents to the ED via  EMS with CC right side weakness, right side numbness and aphasia onset approx 1220pm. Patient arrived with continued complaints but reports symptoms are improving. Denies HA dizziness or blurred vision at this time. Denies CP or SOB. Recently diagnosed with colitis.    Patient identifiers verified verbally with patient and EMS and correct for Dae Amador.    LOC/ APPEARANCE: The patient is AAOx4. Pt changed into hospital gown. Pt is clean and well groomed. No JVD visible. Pt reports pain level of 0. Pt updated on POC. Wife at bedside. Bed low and locked with side rails up x2, call bell in pt reach.  SKIN: Skin is warm dry and intact, and color is consistent with ethnicity. Capillary refill <3 seconds. No breakdown or brusing visible. Mucus membranes moist, acyanotic.  RESPIRATORY: Airway is open and patent. Respirations-spontaneous, unlabored, regular rate, equal bilaterally on inspiration and expiration. No accessory muscle use noted. Lungs clear to auscultation in all fields bilaterally anterior and posterior.   CARDIAC: Patient has regular heart rate.  No peripheral edema noted, and patient has no c/o chest pain. Peripheral pulses present equal and strong throughout.  ABDOMEN: Soft and non-tender to palpation with no distention noted. Normoactive bowel sounds x4 quadrants. Pt has no complaints of abnormal bowel movements, denies blood in stool. Pt reports normal appetite.   NEUROLOGIC: SEE NEURO ASSESSMENT FLOWSHEET  : No complaints of frequency, burning, urgency or blood in the urine. No complaints of incontinence.

## 2019-07-26 NOTE — HPI
39 yr old male with PMH of recently diagnosed colitis on Cipro and Flagyl who presented to the ER with acute onset right facial droop, RSW and numbness and confusion. History obtained from patient and his wife at bedside. LKN 12:20pm.    Patient was getting his hair cut and just as he finished, he started to experience a tightness in his chest followed by a right facial droop, RSW/N with confusion. Symptoms were noted by his friend and his carey. By the time he arrived to ER symptoms were noted to be improving; NIHSS 0. He was oriented and following simple commands, although, he was still complaining of right sided numbness and was having difficulty following 2 step commands and performing learned motor tasks. CT head showed no acute abnormality. MRI Ischemic Protocol showed no evidence of an acute infarct or stenosis. However, incidentally detected L occipital venous anomaly. MRI brain w/ w/o contrast no definite venous anomaly.       Patient's wife reported that he had a simialr episode around 4 yrs ago. Experienced sudden onset right sided numbness. Was taken to [facility], although wife says that he was not scanned or worked up.    No h/o tobacco use/IVDU. Recetly started on Cipro and Flagyl for colitis. No prior history of strokes.    Stroke work up complete. ASA and statin started. Echo results pending. Need to follow up with results once discharged. Follow up in VN clinic in 4-6 weeks of discharge.

## 2019-07-26 NOTE — HOSPITAL COURSE
7/26 - TIA. Admitted to observation for stroke workup.  7/27 - Echo complete, results pending. MRI brain w/ contrast to r/o DVA, negative. OT cleared home no therapy needs. SLP cleared regular, thin diet. Ambulating well in room. NAEON. Discharge home today.

## 2019-07-26 NOTE — H&P
[facility]  Vascular Neurology  Comprehensive Stroke Center  History & Physical    Inpatient consult to Vascular (Stroke) Neurology  Consult performed by: Momo Williamson MD  Consult ordered by: Emy Enciso MD        Assessment/Plan:     Patient is a 39 y.o. year old male with:    * Aphasia  39 yr old male with no vascular risk factors with acute onset RSW/N with facial droop and aphasia. LKN 12:20PM. On arrival to the ER, his symptoms had improved. NIHSS was 0. VAN-. He did appears to have questionable weakness in the RUE and was still complaining of right sided numbness (sensory exam was normal). He was alert and oriented x3 and following simple commands. However, he was unable to follow 2 step commands. Also appeared to have apraxia, acalculia with poor attention and concentration.   Of note, he was able to give a reasonable history of his presentation but appeared frustrated when asked to perform a complex task or calculation as he was unable to do so.  CT head and MRI Ischemic Protocol were both unremarkable save for an incidentally detected L occipital venous anomaly.     -- As this is second episode of transient neurological deficits, will admit for stroke workup, notably TTE.  -- Hold off antiplatelets or statin for now.      Antithrombotics for secondary stroke prevention: Antiplatelets: None: No acute infarct. Not indicated.    Statins for secondary stroke prevention and hyperlipidemia, if present:   Statins: None: Reason: Not indicated.    Aggressive risk factor modification: None     Rehab efforts: The patient has been evaluated by a stroke team provider and the therapy needs have been fully considered based off the presenting complaints and exam findings. The following therapy evaluations are needed: SLP evaluate and treat    Diagnostics ordered/pending: HgbA1C to assess blood glucose levels, TTE to assess cardiac function/status     VTE prophylaxis: Lovenox 40mg  q24    BP parameters: SBP<140            STROKE DOCUMENTATION     Acute Stroke Times   Last Known Normal Date: 07/26/19  Last Known Normal Time: 1220  Symptom Onset Date: 07/26/19  Symptom Onset Time: 1229  Stroke Team Called Date: 07/26/19  Stroke Team Called Time: 1251  Stroke Team Arrival Date: 07/26/19  Stroke Team Arrival Time: 1254    NIH Scale:  1a. Level of Consciousness: 0-->Alert, keenly responsive  1b. LOC Questions: 0-->Answers both questions correctly  1c. LOC Commands: 0-->Performs both tasks correctly  2. Best Gaze: 0-->Normal  3. Visual: 0-->No visual loss  4. Facial Palsy: 0-->Normal symmetrical movements  5a. Motor Arm, Left: 0-->No drift, limb holds 90 (or 45) degrees for full 10 secs  5b. Motor Arm, Right: 0-->No drift, limb holds 90 (or 45) degrees for full 10 secs  6a. Motor Leg, Left: 0-->No drift, leg holds 30 degree position for full 5 secs  6b. Motor Leg, Right: 0-->No drift, leg holds 30 degree position for full 5 secs  7. Limb Ataxia: 0-->Absent  8. Sensory: 0-->Normal, no sensory loss  9. Best Language: 0-->No aphasia, normal  10. Dysarthria: 0-->Normal  11. Extinction and Inattention (formerly Neglect): 0-->No abnormality  Total (NIH Stroke Scale): 0     Modified Quincy Score: 0  Augustine Coma Scale:15   ABCD2 Score:    [id number] Score:   HAS -BLED Score:   ICH Score:   H[employer] Classification:      Thrombolysis Candidate? No, Symptoms resolved on arrival to ER with negative MRI    Delays to Thrombolysis?  No    Interventional Revascularization Candidate?   Is the patient eligible for mechanical endovascular reperfusion (TWIN)?  No; No large vessel occlusion    Hemorrhagic change of an Ischemic Stroke: Does this patient have an ischemic stroke with hemorrhagic changes? No         Subjective:     History of Present Illness:  39 yr old male with PMH of recently diagnosed colitis on Cipro and Flagyl who presented to the ER with acute onset right facial droop, RSW and numbness and  confusion. History obtained from patient and his wife at bedside. RANDY 12:20pm.    Patient was getting his hair cut and just as he finished, he started to experience a tightness in his chest followed by a right facial droop, RSW/N with confusion. Symptoms were noted by his friend and his carey. By the time he arrived to ER symptoms were noted to be improving; NIHSS 0. He was oriented and following simple commands, although, he was still complaining of right sided numbness and was having difficulty following 2 step commands and performing learned motor tasks. CT head showed no acute abnormality. MRI Ischemic Protocol showed no evidence of an acute infarct or stenosis.    Patient's wife reported that he had a simialr episode around 4 yrs ago. Experienced sudden onset right sided numbness. Was taken to [facility], although wife says that he was not scanned or worked up.    No h/o tobacco use/IVDU. Recetly started on Cipro and Flagyl for colitis. No prior history of strokes.          No past medical history on file.  Past Surgical History:   Procedure Laterality Date    FINGER FRACTURE SURGERY Right     index finger, flag football injury     Family History   Problem Relation Age of Onset    No Known Problems Mother     No Known Problems Father     Cancer Maternal Grandmother         unknown details    Melanoma Maternal Grandfather     Diabetes Paternal Grandmother     No Known Problems Paternal Grandfather      Social History     Tobacco Use    Smoking status: Never Smoker    Smokeless tobacco: Never Used   Substance Use Topics    Alcohol use: Yes    Drug use: Never     Review of patient's allergies indicates:  No Known Allergies    Medications: I have reviewed the current medication administration record.      (Not in a hospital admission)    Review of Systems   Constitutional: Negative for chills and fever.   HENT: Negative for trouble swallowing.    Eyes: Negative for visual disturbance.   Respiratory: Negative  for shortness of breath.    Cardiovascular: Negative for chest pain.   Neurological: Positive for speech difficulty and numbness. Negative for facial asymmetry and weakness.   Psychiatric/Behavioral: Positive for confusion. Negative for agitation.     Objective:     Vital Signs (Most Recent):  Temp: 98.3 °F (36.8 °C) (07/26/19 1302)  Pulse: 96 (07/26/19 1310)  Resp: 15 (07/26/19 1302)  BP: 125/75 (07/26/19 1310)  SpO2: 98 % (07/26/19 1310)    Vital Signs Range (Last 24H):  Temp:  [98.3 °F (36.8 °C)]   Pulse:  []   Resp:  [15]   BP: (122-125)/(75-76)   SpO2:  [98 %-100 %]     Physical Exam   Constitutional: He is oriented to person, place, and time. No distress.   Eyes: Pupils are equal, round, and reactive to light. EOM are normal.   Cardiovascular: Normal rate.   Pulmonary/Chest: Effort normal.   Abdominal: Soft.   Neurological: He is alert and oriented to person, place, and time.   Nursing note and vitals reviewed.      Neurological Exam:   LOC: alert  Language: Difficulty following 2 step commands. Noted acalculia and apraxia. Appeared frustrated at not being able to perform learned motor tasks.  Articulation: No dysarthria  Orientation: Person, Place, Time   Visual Fields: Full  EOM (CN III, IV, VI): Full/intact  Facial Sensation (CN V): Normal  Facial Movement (CN VII): Symmetric facial expression    Motor: Arm left  Normal 5/5  Leg left  Normal 5/5  Arm right  Normal 5/5  Leg right Normal 5/5  Cebellar: No evidence of appendicular or axial ataxia  Sensation: Intact to light touch, temperature and vibration      Laboratory:  CMP: No results for input(s): GLUCOSE, CALCIUM, ALBUMIN, PROT, NA, K, CO2, CL, BUN, CREATININE, ALKPHOS, ALT, AST, BILITOT in the last 24 hours.  CBC:   Recent Labs   Lab 07/26/19  1308   WBC 6.40   RBC 4.62   HGB 12.8*   HCT 38.7*      MCV 84   MCH 27.7   MCHC 33.1     Lipid Panel:   Recent Labs   Lab 07/23/19  0709   CHOL 114*   LDLCALC 70.6   HDL 26*   TRIG 87      Coagulation:   Recent Labs   Lab 07/26/19  1308   INR 1.3*     Hgb A1C: No results for input(s): HGBA1C in the last 168 hours.  TSH: No results for input(s): TSH in the last 168 hours.    Diagnostic Results:      Brain/Vessel imaging:    MRI Ischemic Protocol 7/25/19 -     No evidence of acute infarct or stenosis.    CT Head 7/26/19 -     There is no evidence of acute major vascular territory infarct, hemorrhage, or mass.  There is no hydrocephalus.  There are no abnormal extra-axial fluid collections.  There are a few punctate foci of hypoattenuation within the periphery of the inferior most left occipital lobe, noting these are in a region of artifact and best visualized on the thin cut images.  Findings are nonspecific.  The paranasal sinuses and mastoid air cells are clear, and there is no evidence of calvarial fracture.  The visualized soft tissues are unremarkable.    Cardiac Evaluation:     TTE pending        Momo Williamson MD  Comprehensive Stroke Center  Department of Vascular Neurology   [facility]

## 2019-07-26 NOTE — ED NOTES
Tele box [id number] applied to pt. RICHARD in war room states able to see pt on monitor, rhythm NSR with HR 88.

## 2019-07-26 NOTE — ED NOTES
DR ART AND STROKE TEAM ASSESSING PT IN EMS [facility], PT BROUGHT STRAIGHT TO CT VIA STRETCHER WITH RN, PLACED ON CONT CARDIAC MONITOR CONT PULSE OX AND AUTO BP CUFF.

## 2019-07-26 NOTE — ED PROVIDER NOTES
Encounter Date: 7/26/2019    SCRIBE #1 NOTE: I, Chastity Sen, am scribing for, and in the presence of,  Dr. Enciso. I have scribed the entire note.       History     Chief Complaint   Patient presents with    Extremity Weakness     onset approx 1220pm today, arrived via [facility]     Time patient was seen by the provider: 12:58 PM      The patient is a 39 y.o. male with no significant past medical history, who presents to the ED via EMS for an evaluation of stroke. The patient reports he was sitting at a [employer] waiting area when he had a sudden onset of confusion, difficulty speaking, right-sided weakness and numbness, states symptoms started 20-30 minutes PTA. Last known normal at 12:20 PM today. EMS reports patient appeared to have a right facial droop on arrival at the scene. Denies any headache, neck pain. On arrival to ED patient reports weakness and numbness have improved. The patient's wife appeared in the ED room 15 minutes after the patient arrived and reports patient had a similar episode in the past 4-5 years ago where he never fully had a work up however he did not get a CT scan done at that time. No history of migraines. Denies any recent medication changes, denies drug use.       The history is provided by the patient, the EMS personnel, medical records and the spouse.     Review of patient's allergies indicates:  No Known Allergies  History reviewed. No pertinent past medical history.  Past Surgical History:   Procedure Laterality Date    FINGER FRACTURE SURGERY Right     index finger, flag football injury     Family History   Problem Relation Age of Onset    No Known Problems Mother     No Known Problems Father     Cancer Maternal Grandmother         unknown details    Melanoma Maternal Grandfather     Diabetes Paternal Grandmother     No Known Problems Paternal Grandfather      Social History     Tobacco Use    Smoking status: Never Smoker    Smokeless tobacco: Never Used    Substance Use Topics    Alcohol use: Yes    Drug use: Never     Review of Systems   Constitutional: Negative for fever.   HENT: Negative for sore throat.    Respiratory: Negative for shortness of breath.    Cardiovascular: Negative for chest pain.   Gastrointestinal: Negative for nausea.   Genitourinary: Negative for dysuria.   Musculoskeletal: Negative for back pain and neck pain.   Skin: Negative for rash.   Neurological: Positive for facial asymmetry (right), speech difficulty, weakness (right-sided) and numbness (right-sided). Negative for headaches.   Hematological: Does not bruise/bleed easily.   Psychiatric/Behavioral: Positive for confusion.       Physical Exam     Initial Vitals [07/26/19 1302]   BP Pulse Resp Temp SpO2   122/76 103 15 98.3 °F (36.8 °C) 100 %      MAP       --         Physical Exam    Nursing note and vitals reviewed.  Constitutional: He appears well-developed. No distress.   HENT:   Head: Normocephalic and atraumatic.   Eyes: EOM are normal. Pupils are equal, round, and reactive to light.   Cardiovascular: Normal rate, regular rhythm and normal heart sounds.   Pulmonary/Chest: No respiratory distress.   Abdominal: Normal appearance. He exhibits no distension.   Musculoskeletal: He exhibits no edema.   Neurological: He is alert. No cranial nerve deficit. GCS score is 15. GCS eye subscore is 4. GCS verbal subscore is 5. GCS motor subscore is 6.   Oriented to time and place. CN 2-12 intact. No upper extremity drift.  strengths are slightly diminished when initially assessed. Strength now 5/5 in upper and lower extremities. Has expressive aphasia and difficulty following commands.    Skin: No rash noted.         ED Course   Procedures  Labs Reviewed   CBC W/ AUTO DIFFERENTIAL - Abnormal; Notable for the following components:       Result Value    Hemoglobin 12.8 (*)     Hematocrit 38.7 (*)     Gran% 29.4 (*)     Lymph% 59.1 (*)     All other components within normal limits    COMPREHENSIVE METABOLIC PANEL - Abnormal; Notable for the following components:    Glucose 118 (*)     Calcium 8.5 (*)     AST 46 (*)     ALT 94 (*)     All other components within normal limits   PROTIME-INR - Abnormal; Notable for the following components:    Prothrombin Time 13.1 (*)     INR 1.3 (*)     All other components within normal limits   LIPID PANEL - Abnormal; Notable for the following components:    HDL 26 (*)     All other components within normal limits   TSH    Narrative:     ADD ON [facility] [phone] PER DK ART MD  07/26/2019  13:54    HEMOGLOBIN A1C   URINALYSIS   HEMOGLOBIN A1C   POCT GLUCOSE, HAND-HELD DEVICE   POCT GLUCOSE   ISTAT PROCEDURE   ISTAT CREATININE     EKG Readings: (Independently Interpreted)   Initial Reading: No STEMI. Rhythm: Normal Sinus Rhythm.   Anterior T wave inversions.      ECG Results          ECG 12 lead (In process)  Result time 07/26/19 13:24:59    In process by Interface, Lab In [facility] (07/26/19 13:24:59)                 Narrative:    Test Reason : I63.9,    Vent. Rate : 097 BPM     Atrial Rate : 097 BPM     P-R Int : 142 ms          QRS Dur : 090 ms      QT Int : 340 ms       P-R-T Axes : 084 040 077 degrees     QTc Int : 431 ms    Normal sinus rhythm  Nonspecific T wave abnormality  Abnormal ECG  When compared with ECG of 16-JUL-2019 09:06,  No significant change was found    Referred By: DK ART           Confirmed By:                             Imaging Results          X-Ray Chest AP Portable (Final result)  Result time 07/26/19 13:58:10    Final result by Aquilino Maddox MD (07/26/19 13:58:10)                 Impression:      No significant intrathoracic abnormality.  Allowing for differences in projection, no significant interval change in the appearance of the chest since 07/11/2019.      Electronically signed by: Aquilino Maddox MD  Date:    07/26/2019  Time:    13:58             Narrative:    EXAMINATION:  XR CHEST AP PORTABLE    CLINICAL  HISTORY:  Stroke;    COMPARISON:  Comparison is made to 07/11/2019.    FINDINGS:  Heart size is normal, as is the appearance of the pulmonary vascularity.  Lung zones remain clear, and are free of significant airspace consolidation or volume loss.  No pleural fluid.  No hilar or mediastinal mass lesion.  No pneumothorax.                               CT Head Without Contrast (Final result)  Result time 07/26/19 13:15:31    Final result by Juan Pablo Sam MD (07/26/19 13:15:31)                 Impression:      1. No acute intracranial abnormalities.      Electronically signed by: Juan Pablo Sam MD  Date:    07/26/2019  Time:    13:15             Narrative:    EXAMINATION:  CT HEAD WITHOUT CONTRAST    CLINICAL HISTORY:  Stroke;    TECHNIQUE:  Low dose axial images were obtained through the head.  Coronal and sagittal reformations were also performed. Contrast was not administered.    COMPARISON:  None.    FINDINGS:  There is no evidence of acute major vascular territory infarct, hemorrhage, or mass.  There is no hydrocephalus.  There are no abnormal extra-axial fluid collections.  There are a few punctate foci of hypoattenuation within the periphery of the inferior most left occipital lobe, noting these are in a region of artifact and best visualized on the thin cut images.  Findings are nonspecific.  The paranasal sinuses and mastoid air cells are clear, and there is no evidence of calvarial fracture.  The visualized soft tissues are unremarkable.                                 Medical Decision Making:   History:   I obtained history from: EMS provider.  Old Medical Records: I decided to obtain old medical records.  Initial Assessment:   38 yo m, healthy, denies drug use, no h/o migraines    Acute onset R sided weakness/numbness and aphasia/confusion, 30 min PTA  Weakness/numbness improving on arrival though pt still with significant confusion/aphasia    Stroke code activated from the field and stroke team at  the bedside on pt arrival    FS normal  CT head STAT without abnormalities  Differential Diagnosis:   TIA/CVA  Complicated migraine  Conversion disorder  MS - apparently had similar episode 4-5 years ago  Independently Interpreted Test(s):   I have ordered and independently interpreted X-rays - see prior notes.  I have ordered and independently interpreted EKG Reading(s) - see prior notes  Clinical Tests:   Lab Tests: Ordered and Reviewed  Radiological Study: Ordered and Reviewed  Medical Tests: Ordered and Reviewed  ED Management:  Discussed next steps with vascular neuro - they would like a MRI brain and MRA head/neck  They plan to admit pt to vascular neuro for further work-up  Other:   I have discussed this case with another health care provider.       <> Summary of the Discussion: Vascular (stroke) Neurology            Scribe Attestation:   Scribe #1: I performed the above scribed service and the documentation accurately describes the services I performed. I attest to the accuracy of the note.    Attending Attestation:         Attending Critical Care:   Critical Care Times:   ==============================================================  · Total Critical Care Time - exclusive of procedural time: 35 minutes.  ==============================================================  Critical care was necessary to treat or prevent imminent or life-threatening deterioration of the following conditions: CNS failure and stroke.   Critical care was time spent personally by me on the following activities: obtaining history from patient or relative, examination of patient, review of x-rays / CT sent with the patient, review of old charts, ordering lab, x-rays, and/or EKG, development of treatment plan with patient or relative, ordering and performing treatments and interventions, evaluation of patient's response to treatment, discussion with consultants and re-evaluation of patient's conition.   Critical Care Condition: critical        I, Dr. Emy Enciso, personally performed the services described in this documentation. All medical record entries made by the scribe were at my direction and in my presence.  I have reviewed the chart and agree that the record reflects my personal performance and is accurate and complete. Emy Enciso MD.  1:30 PM 07/26/2019           Clinical Impression:       ICD-10-CM ICD-9-CM   1. Aphasia R47.01 784.3   2. Stroke I63.9 434.91         Disposition:   Disposition: Admitted                        Emy Enciso MD  07/26/19 [id number]

## 2019-07-26 NOTE — ASSESSMENT & PLAN NOTE
39 yr old male with no vascular risk factors with acute onset RSW/N with facial droop and aphasia. LKN 12:20PM. On arrival to the ER, his symptoms had improved. NIHSS was 0. VAN-. He did appears to have questionable weakness in the RUE and was still complaining of right sided numbness (sensory exam was normal). He was alert and oriented x3 and following simple commands. However, he was unable to follow 2 step commands. Also appeared to have apraxia, acalculia with poor attention and concentration.   Of note, he was able to give a reasonable history of his presentation but appeared frustrated when asked to perform a complex task or calculation as he was unable to do so.  CT head and MRI Ischemic Protocol were both unremarkable save for an incidentally detected L occipital venous anomaly.     -- As this is second episode of transient neurological deficits, will admit for stroke workup, notably TTE.  -- Hold off antiplatelets or statin for now.      Antithrombotics for secondary stroke prevention: Antiplatelets: None: No acute infarct. Not indicated.    Statins for secondary stroke prevention and hyperlipidemia, if present:   Statins: None: Reason: Not indicated.    Aggressive risk factor modification: None     Rehab efforts: The patient has been evaluated by a stroke team provider and the therapy needs have been fully considered based off the presenting complaints and exam findings. The following therapy evaluations are needed: SLP evaluate and treat    Diagnostics ordered/pending: HgbA1C to assess blood glucose levels, TTE to assess cardiac function/status     VTE prophylaxis: Heparin 5000 units SQ every 8 hours    BP parameters: SBP<140

## 2019-07-26 NOTE — CONSULTS
Inpatient consult to Physical Medicine Rehab  Consult performed by: NIHARIKA Gallagher  Consult ordered by: Momo Williamson MD  Reason for consult: assess rehab needs      Consult received.  Reviewed patient history and current admission.  Rehab team following.  Full consult to follow.    DOMINIC Moscoso, JOHNP-C  Physical Medicine & Rehabilitation   07/26/2019  Spectralink: [id number]

## 2019-07-27 VITALS
SYSTOLIC BLOOD PRESSURE: 125 MMHG | DIASTOLIC BLOOD PRESSURE: 76 MMHG | OXYGEN SATURATION: 99 % | HEART RATE: 80 BPM | TEMPERATURE: 98 F | HEIGHT: 70 IN | RESPIRATION RATE: 16 BRPM | WEIGHT: 180 LBS | BODY MASS INDEX: 25.77 KG/M2

## 2019-07-27 PROBLEM — E78.5 HYPERLIPIDEMIA: Status: ACTIVE | Noted: 2019-07-27

## 2019-07-27 LAB
ALBUMIN SERPL BCP-MCNC: 3.7 G/DL (ref 3.5–5.2)
ALP SERPL-CCNC: 53 U/L (ref 55–135)
ALT SERPL W/O P-5'-P-CCNC: 81 U/L (ref 10–44)
ANION GAP SERPL CALC-SCNC: 7 MMOL/L (ref 8–16)
APTT BLDCRRT: 23.4 SEC (ref 21–32)
ASCENDING AORTA: 2.57 CM
AST SERPL-CCNC: 38 U/L (ref 10–40)
AV INDEX (PROSTH): 0.9
AV MEAN GRADIENT: 5 MMHG
AV PEAK GRADIENT: 8 MMHG
AV VALVE AREA: 2.7 CM2
AV VELOCITY RATIO: 0.91
BASOPHILS # BLD AUTO: 0.03 K/UL (ref 0–0.2)
BASOPHILS NFR BLD: 0.6 % (ref 0–1.9)
BILIRUB SERPL-MCNC: 0.3 MG/DL (ref 0.1–1)
BSA FOR ECHO PROCEDURE: 2.01 M2
BUN SERPL-MCNC: 11 MG/DL (ref 6–20)
CALCIUM SERPL-MCNC: 9.2 MG/DL (ref 8.7–10.5)
CHLORIDE SERPL-SCNC: 104 MMOL/L (ref 95–110)
CK MB SERPL-MCNC: 0.3 NG/ML (ref 0.1–6.5)
CK MB SERPL-RTO: 0.8 % (ref 0–5)
CK SERPL-CCNC: 40 U/L (ref 20–200)
CO2 SERPL-SCNC: 28 MMOL/L (ref 23–29)
CREAT SERPL-MCNC: 0.9 MG/DL (ref 0.5–1.4)
CV ECHO LV RWT: 0.31 CM
DIFFERENTIAL METHOD: ABNORMAL
DOP CALC AO PEAK VEL: 1.39 M/S
DOP CALC AO VTI: 24.47 CM
DOP CALC LVOT AREA: 3 CM2
DOP CALC LVOT DIAMETER: 1.96 CM
DOP CALC LVOT PEAK VEL: 1.26 M/S
DOP CALC LVOT STROKE VOLUME: 66.1 CM3
DOP CALCLVOT PEAK VEL VTI: 21.92 CM
E WAVE DECELERATION TIME: 215.05 MSEC
E/A RATIO: 1.7
ECHO LV POSTERIOR WALL: 0.75 CM (ref 0.6–1.1)
EOSINOPHIL # BLD AUTO: 0.1 K/UL (ref 0–0.5)
EOSINOPHIL NFR BLD: 1 % (ref 0–8)
ERYTHROCYTE [DISTWIDTH] IN BLOOD BY AUTOMATED COUNT: 12 % (ref 11.5–14.5)
EST. GFR  (AFRICAN AMERICAN): >60 ML/MIN/1.73 M^2
EST. GFR  (NON AFRICAN AMERICAN): >60 ML/MIN/1.73 M^2
FRACTIONAL SHORTENING: 32 % (ref 28–44)
GLUCOSE SERPL-MCNC: 95 MG/DL (ref 70–110)
HCT VFR BLD AUTO: 39.8 % (ref 40–54)
HGB BLD-MCNC: 12.6 G/DL (ref 14–18)
IMM GRANULOCYTES # BLD AUTO: 0.01 K/UL (ref 0–0.04)
IMM GRANULOCYTES NFR BLD AUTO: 0.2 % (ref 0–0.5)
INTERVENTRICULAR SEPTUM: 0.6 CM (ref 0.6–1.1)
IVRT: 0.09 MSEC
LA MAJOR: 4.99 CM
LA MINOR: 4.95 CM
LA WIDTH: 3.8 CM
LEFT ATRIUM SIZE: 3.28 CM
LEFT ATRIUM VOLUME INDEX: 26.4 ML/M2
LEFT ATRIUM VOLUME: 52.65 CM3
LEFT INTERNAL DIMENSION IN SYSTOLE: 3.28 CM (ref 2.1–4)
LEFT VENTRICLE DIASTOLIC VOLUME INDEX: 54.99 ML/M2
LEFT VENTRICLE DIASTOLIC VOLUME: 109.76 ML
LEFT VENTRICLE MASS INDEX: 52 G/M2
LEFT VENTRICLE SYSTOLIC VOLUME INDEX: 21.7 ML/M2
LEFT VENTRICLE SYSTOLIC VOLUME: 43.39 ML
LEFT VENTRICULAR INTERNAL DIMENSION IN DIASTOLE: 4.84 CM (ref 3.5–6)
LEFT VENTRICULAR MASS: 103.6 G
LYMPHOCYTES # BLD AUTO: 2.2 K/UL (ref 1–4.8)
LYMPHOCYTES NFR BLD: 44.7 % (ref 18–48)
MAGNESIUM SERPL-MCNC: 2.1 MG/DL (ref 1.6–2.6)
MCH RBC QN AUTO: 27.3 PG (ref 27–31)
MCHC RBC AUTO-ENTMCNC: 31.7 G/DL (ref 32–36)
MCV RBC AUTO: 86 FL (ref 82–98)
MONOCYTES # BLD AUTO: 0.6 K/UL (ref 0.3–1)
MONOCYTES NFR BLD: 11.1 % (ref 4–15)
MV PEAK A VEL: 0.4 M/S
MV PEAK E VEL: 0.68 M/S
NEUTROPHILS # BLD AUTO: 2.1 K/UL (ref 1.8–7.7)
NEUTROPHILS NFR BLD: 42.4 % (ref 38–73)
NRBC BLD-RTO: 0 /100 WBC
PHOSPHATE SERPL-MCNC: 4 MG/DL (ref 2.7–4.5)
PLATELET # BLD AUTO: 144 K/UL (ref 150–350)
PMV BLD AUTO: 11.8 FL (ref 9.2–12.9)
POTASSIUM SERPL-SCNC: 4.6 MMOL/L (ref 3.5–5.1)
PROT SERPL-MCNC: 6.3 G/DL (ref 6–8.4)
PULM VEIN S/D RATIO: 1.34
PV PEAK D VEL: 0.35 M/S
PV PEAK S VEL: 0.47 M/S
RA MAJOR: 4.66 CM
RA PRESSURE: 3 MMHG
RA WIDTH: 3 CM
RBC # BLD AUTO: 4.62 M/UL (ref 4.6–6.2)
RIGHT VENTRICULAR END-DIASTOLIC DIMENSION: 3.06 CM
SINUS: 2.93 CM
SODIUM SERPL-SCNC: 139 MMOL/L (ref 136–145)
STJ: 2.66 CM
TRICUSPID ANNULAR PLANE SYSTOLIC EXCURSION: 2.22 CM
TROPONIN I SERPL DL<=0.01 NG/ML-MCNC: <0.006 NG/ML (ref 0–0.03)
WBC # BLD AUTO: 4.97 K/UL (ref 3.9–12.7)

## 2019-07-27 PROCEDURE — 36415 COLL VENOUS BLD VENIPUNCTURE: CPT

## 2019-07-27 PROCEDURE — 82550 ASSAY OF CK (CPK): CPT

## 2019-07-27 PROCEDURE — 97165 OT EVAL LOW COMPLEX 30 MIN: CPT

## 2019-07-27 PROCEDURE — 82553 CREATINE MB FRACTION: CPT

## 2019-07-27 PROCEDURE — 92610 EVALUATE SWALLOWING FUNCTION: CPT

## 2019-07-27 PROCEDURE — 80053 COMPREHEN METABOLIC PANEL: CPT

## 2019-07-27 PROCEDURE — 85025 COMPLETE CBC W/AUTO DIFF WBC: CPT

## 2019-07-27 PROCEDURE — 83735 ASSAY OF MAGNESIUM: CPT

## 2019-07-27 PROCEDURE — 97530 THERAPEUTIC ACTIVITIES: CPT

## 2019-07-27 PROCEDURE — A9585 GADOBUTROL INJECTION: HCPCS | Performed by: PSYCHIATRY & NEUROLOGY

## 2019-07-27 PROCEDURE — 99217 PR OBSERVATION CARE DISCHARGE: CPT | Mod: ,,, | Performed by: PSYCHIATRY & NEUROLOGY

## 2019-07-27 PROCEDURE — 92523 SPEECH SOUND LANG COMPREHEN: CPT

## 2019-07-27 PROCEDURE — 25000003 PHARM REV CODE 250: Performed by: PSYCHIATRY & NEUROLOGY

## 2019-07-27 PROCEDURE — 84100 ASSAY OF PHOSPHORUS: CPT

## 2019-07-27 PROCEDURE — 84484 ASSAY OF TROPONIN QUANT: CPT

## 2019-07-27 PROCEDURE — 85730 THROMBOPLASTIN TIME PARTIAL: CPT

## 2019-07-27 PROCEDURE — G0378 HOSPITAL OBSERVATION PER HR: HCPCS

## 2019-07-27 PROCEDURE — 99217 PR OBSERVATION CARE DISCHARGE: ICD-10-PCS | Mod: ,,, | Performed by: PSYCHIATRY & NEUROLOGY

## 2019-07-27 PROCEDURE — 63600175 PHARM REV CODE 636 W HCPCS: Performed by: PSYCHIATRY & NEUROLOGY

## 2019-07-27 PROCEDURE — 25500020 PHARM REV CODE 255: Performed by: PSYCHIATRY & NEUROLOGY

## 2019-07-27 RX ORDER — GADOBUTROL 604.72 MG/ML
9 INJECTION INTRAVENOUS
Status: COMPLETED | OUTPATIENT
Start: 2019-07-27 | End: 2019-07-27

## 2019-07-27 RX ORDER — ATORVASTATIN CALCIUM 20 MG/1
20 TABLET, FILM COATED ORAL DAILY
Qty: 90 TABLET | Refills: 3 | Status: SHIPPED | OUTPATIENT
Start: 2019-07-28 | End: 2020-09-17

## 2019-07-27 RX ORDER — MIDAZOLAM HYDROCHLORIDE 1 MG/ML
0.5 INJECTION INTRAMUSCULAR; INTRAVENOUS ONCE
Status: COMPLETED | OUTPATIENT
Start: 2019-07-27 | End: 2019-07-27

## 2019-07-27 RX ORDER — ASPIRIN 81 MG/1
81 TABLET ORAL DAILY
Refills: 0 | COMMUNITY
Start: 2019-07-28 | End: 2021-10-13

## 2019-07-27 RX ADMIN — MIDAZOLAM HYDROCHLORIDE 0.5 MG: 1 INJECTION, SOLUTION INTRAMUSCULAR; INTRAVENOUS at 12:07

## 2019-07-27 RX ADMIN — GADOBUTROL 9 ML: 604.72 INJECTION INTRAVENOUS at 12:07

## 2019-07-27 RX ADMIN — ATORVASTATIN CALCIUM 20 MG: 20 TABLET, FILM COATED ORAL at 09:07

## 2019-07-27 RX ADMIN — ASPIRIN 81 MG: 81 TABLET, COATED ORAL at 09:07

## 2019-07-27 NOTE — PLAN OF CARE
Problem: SLP Goal  Goal: SLP Goal  Patient seen for a bedside swallow eval and a speech/language/cognitive assessment. SLP recommending continued REGULAR DIET/THIN LIQUIDS. Patient at baseline from a  perspective and appropriate for discharge from  at this time.     Meliton Tom CCC-SLP  Speech-Language Pathology  Pager: [phone]

## 2019-07-27 NOTE — PLAN OF CARE
Problem: Occupational Therapy Goal  Goal: Occupational Therapy Goal  Goals not set 2* no further OT needed.  OT evaluation completed.  JACKLYN Brooks  7/27/2019

## 2019-07-27 NOTE — SUBJECTIVE & OBJECTIVE
Neurologic Chief Complaint: RSW, R facial droop, aphasia    Subjective:     Interval History: Patient is seen for follow-up neurological assessment and treatment recommendations: Echo complete, results pending. MRI brain w/ contrast to r/o DVA, negative. OT cleared home no therapy needs. SLP cleared regular, thin diet. Ambulating well in room. NAEON. Discharge home today.     HPI, Past Medical, Family, and Social History remains the same as documented in the initial encounter.     Review of Systems   Constitutional: Negative for chills and fever.   HENT: Negative for trouble swallowing and voice change.    Eyes: Negative for visual disturbance.   Respiratory: Negative for shortness of breath.    Cardiovascular: Negative for chest pain.   Gastrointestinal: Negative for nausea and vomiting.   Neurological: Negative for facial asymmetry, speech difficulty, weakness, numbness and headaches.   Psychiatric/Behavioral: Negative for confusion and decreased concentration.     Scheduled Meds:   aspirin  81 mg Oral Daily    atorvastatin  20 mg Oral Daily    enoxaparin  40 mg Subcutaneous Daily     Continuous Infusions:   sodium chloride 0.9%       PRN Meds:labetalol, ondansetron, polyethylene glycol, sodium chloride 0.9%, sodium chloride 0.9%    Objective:     Vital Signs (Most Recent):  Temp: 98.4 °F (36.9 °C) (07/27/19 1217)  Pulse: 80 (07/27/19 1217)  Resp: 16 (07/27/19 1217)  BP: 125/76 (07/27/19 1217)  SpO2: 99 % (07/27/19 1217)  BP Location: Left arm    Vital Signs Range (Last 24H):  Temp:  [97.5 °F (36.4 °C)-98.6 °F (37 °C)]   Pulse:  [70-88]   Resp:  [16-18]   BP: (100-125)/(57-84)   SpO2:  [97 %-99 %]   BP Location: Left arm    Physical Exam   Constitutional: He is oriented to person, place, and time. He appears well-developed.   HENT:   Head: Normocephalic.   Eyes: Pupils are equal, round, and reactive to light.   Neck: Normal range of motion.   Cardiovascular: Normal rate.   Pulmonary/Chest: Effort normal.    Abdominal: Soft.   Musculoskeletal: Normal range of motion.   Neurological: He is alert and oriented to person, place, and time.   Skin: Skin is warm and dry.   Psychiatric: He has a normal mood and affect.   Vitals reviewed.      Neurological Exam:   LOC: alert  Attention Span: Good   Language: No aphasia  Articulation: No dysarthria  Orientation: Person, Place, Time   Visual Fields: Full  EOM (CN III, IV, VI): Full/intact  Facial Movement (CN VII): Symmetric facial expression    Motor: Arm left  Normal 5/5  Leg left  Normal 5/5  Arm right  Normal 5/5  Leg right Normal 5/5  Cebellar: No evidence of appendicular or axial ataxia  Tone: Normal tone throughout    Laboratory:  CMP:   Recent Labs   Lab 07/27/19  0441   CALCIUM 9.2   ALBUMIN 3.7   PROT 6.3      K 4.6   CO2 28      BUN 11   CREATININE 0.9   ALKPHOS 53*   ALT 81*   AST 38   BILITOT 0.3     CBC:   Recent Labs   Lab 07/27/19  0441   WBC 4.97   RBC 4.62   HGB 12.6*   HCT 39.8*   *   MCV 86   MCH 27.3   MCHC 31.7*     Lipid Panel:   Recent Labs   Lab 07/26/19  1308   CHOL 120   LDLCALC 75.4   HDL 26*   TRIG 93     Hgb A1C:   Recent Labs   Lab 07/26/19  1308   HGBA1C 5.4     TSH:   Recent Labs   Lab 07/26/19  1308   TSH 1.324       Diagnostic Results        Brain/Vessel imaging:     MRI brain W/ W/O Contrast 7/27/19  No acute intracranial abnormality.    No definite DVA identified.    MRI Ischemic Protocol 7/25/19 -      No evidence of acute infarct or stenosis.     CT Head 7/26/19 -      There is no evidence of acute major vascular territory infarct, hemorrhage, or mass.  There is no hydrocephalus.  There are no abnormal extra-axial fluid collections.  There are a few punctate foci of hypoattenuation within the periphery of the inferior most left occipital lobe, noting these are in a region of artifact and best visualized on the thin cut images.  Findings are nonspecific.  The paranasal sinuses and mastoid air cells are clear, and there is  no evidence of calvarial fracture.  The visualized soft tissues are unremarkable.     Cardiac Evaluation:      TTE  7/27/19 results pending

## 2019-07-27 NOTE — DISCHARGE SUMMARY
[facility]  Vascular Neurology  Comprehensive Stroke Center  Discharge Summary     Summary:     Admit Date: 7/26/2019  1:01 PM    Discharge Date and Time: 7/27/19    Attending Physician: Al Adhikari MD     Discharge Provider: Sebastián Deluna NP    History of Present Illness: 39 yr old male with PMH of recently diagnosed colitis on Cipro and Flagyl who presented to the ER with acute onset right facial droop, RSW and numbness and confusion. History obtained from patient and his wife at bedside. LKN 12:20pm.    Patient was getting his hair cut and just as he finished, he started to experience a tightness in his chest followed by a right facial droop, RSW/N with confusion. Symptoms were noted by his friend and his carey. By the time he arrived to ER symptoms were noted to be improving; NIHSS 0. He was oriented and following simple commands, although, he was still complaining of right sided numbness and was having difficulty following 2 step commands and performing learned motor tasks. CT head showed no acute abnormality. MRI Ischemic Protocol showed no evidence of an acute infarct or stenosis. However, incidentally detected L occipital venous anomaly. MRI brain w/ w/o contrast no definite venous anomaly.       Patient's wife reported that he had a simialr episode around 4 yrs ago. Experienced sudden onset right sided numbness. Was taken to [facility], although wife says that he was not scanned or worked up.    No h/o tobacco use/IVDU. Recetly started on Cipro and Flagyl for colitis. No prior history of strokes.    Stroke work up complete. ASA and statin started. Echo results pending. Need to follow up with results once discharged. Follow up in VN clinic in 4-6 weeks of discharge.         Hospital Course (synopsis of major diagnoses, care, treatment, and services provided during the course of the hospital stay): 7/26 - TIA. Admitted to observation for stroke workup.  7/27 - Echo complete, results  pending. MRI brain w/ contrast to r/o DVA, negative. OT cleared home no therapy needs. SLP cleared regular, thin diet. Ambulating well in room. NAEON. Discharge home today.     Stroke Etiology: Not Applicable/Not Ischemic Stroke    STROKE DOCUMENTATION   Acute Stroke Times   Last Known Normal Date: 07/26/19  Last Known Normal Time: 1220  Symptom Onset Date: 07/26/19  Symptom Onset Time: 1229  Stroke Team Called Date: 07/26/19  Stroke Team Called Time: 1251  Stroke Team Arrival Date: 07/26/19  Stroke Team Arrival Time: 1254     NIH Scale:  1a. Level of Consciousness: 0-->Alert, keenly responsive  1b. LOC Questions: 0-->Answers both questions correctly  1c. LOC Commands: 0-->Performs both tasks correctly  2. Best Gaze: 0-->Normal  3. Visual: 0-->No visual loss  4. Facial Palsy: 0-->Normal symmetrical movements  5a. Motor Arm, Left: 0-->No drift, limb holds 90 (or 45) degrees for full 10 secs  5b. Motor Arm, Right: 0-->No drift, limb holds 90 (or 45) degrees for full 10 secs  6a. Motor Leg, Left: 0-->No drift, leg holds 30 degree position for full 5 secs  6b. Motor Leg, Right: 0-->No drift, leg holds 30 degree position for full 5 secs  7. Limb Ataxia: 0-->Absent  8. Sensory: 0-->Normal, no sensory loss  9. Best Language: 0-->No aphasia, normal  10. Dysarthria: 0-->Normal  11. Extinction and Inattention (formerly Neglect): 0-->No abnormality  Total (NIH Stroke Scale): 0        Modified Aga Score: 0  Augustine Coma Scale:    ABCD2 Score: 3  [id number] Score:   HAS -BLED Score:   ICH Score:   [employer] Classification:       Assessment/Plan:     Diagnostic Results:      MRI brain W/ W/O Contrast 7/27/19  No acute intracranial abnormality.    No definite DVA identified.     MRI Ischemic Protocol 7/25/19 -      No evidence of acute infarct or stenosis.     CT Head 7/26/19 -      There is no evidence of acute major vascular territory infarct, hemorrhage, or mass.  There is no hydrocephalus.  There are no abnormal  extra-axial fluid collections.  There are a few punctate foci of hypoattenuation within the periphery of the inferior most left occipital lobe, noting these are in a region of artifact and best visualized on the thin cut images.  Findings are nonspecific.  The paranasal sinuses and mastoid air cells are clear, and there is no evidence of calvarial fracture.  The visualized soft tissues are unremarkable.     Cardiac Evaluation:      TTE  7/27/19 results pending      Interventions: None    Complications: None    Disposition: Home or Self Care    Final Active Diagnoses:    Diagnosis Date Noted POA    PRINCIPAL PROBLEM:  Aphasia [R47.01] 07/26/2019 Yes    Hyperlipidemia [E78.5] 07/27/2019 Yes      Problems Resolved During this Admission:     * Aphasia  39 yr old male with no vascular risk factors with acute onset RSW/N with facial droop and aphasia. LKN 12:20PM. On arrival to the ER, his symptoms had improved. NIHSS was 0. VAN-. He did appears to have questionable weakness in the RUE and was still complaining of right sided numbness (sensory exam was normal). He was alert and oriented x3 and following simple commands. However, he was unable to follow 2 step commands. Also appeared to have apraxia, acalculia with poor attention and concentration.   Of note, he was able to give a reasonable history of his presentation but appeared frustrated when asked to perform a complex task or calculation as he was unable to do so.  CT head and MRI Ischemic Protocol were both unremarkable save for an incidentally detected L occipital venous anomaly. MRI brain w/ w/o contrast no definite venous anomaly.    -- As this is second episode of transient neurological deficits, will admit for stroke workup, notably TTE.      Antithrombotics for secondary stroke prevention: Antiplatelets: Aspirin: 81 mg daily    Statins for secondary stroke prevention and hyperlipidemia, if present:   Statins: Atorvastatin- 20  mg daily    Aggressive risk  factor modification: None     Rehab efforts: The patient has been evaluated by a stroke team provider and the therapy needs have been fully considered based off the presenting complaints and exam findings. The following therapy evaluations are needed: SLP evaluate and treat - regular diet    Diagnostics ordered/pending: TTE to assess cardiac function/status     VTE prophylaxis: Heparin 5000 units SQ every 8 hours, SCDs    BP parameters: SBP<140        Hyperlipidemia  Stroke risk factor   LDL 75  Atorvastatin 20        Recommendations:     Post-discharge complication risks: Falls    Stroke Education given to: patient and family    Follow-up in Stroke Clinic in 4-6 weeks.     Discharge Plan:  Antithrombotics: Aspirin 81 mg  Statin: Atorvastatin 20 mg  Aggresive risk factor modification:  High Cholesterol  Diet    Follow Up:  Follow-up Information     Miladis Sequeira MD In 10 days.    Specialty:  Internal Medicine  Why:  Outpatient Services; Hospital admission follow up. Please see PCP within 10 days of discharge.   Contact information:  [id number]  [facility] [address]  [phone]             [facility] VASCULAR NEUROLOGY In 4 weeks.    Specialty:  Vascular Neurology  Why:  Someone will call to schedule an appointment within 4-6 weeks of discharge. If you do not receive a call within 1 week, please call number listed.   Contact information:  [address]  [facility] [address]  [phone]                 Patient Instructions:      Activity as tolerated       Medications:  Reconciled Home Medications:      Medication List      START taking these medications    aspirin 81 MG EC tablet  Commonly known as:  ECOTRIN  Take 1 tablet (81 mg total) by mouth once daily.  Start taking on:  7/28/2019     atorvastatin 20 MG tablet  Commonly known as:  LIPITOR  Take 1 tablet (20 mg total) by mouth once daily.  Start taking on:  7/28/2019        CONTINUE taking these medications    ondansetron 4 MG  tablet  Commonly known as:  ZOFRAN  Take 1 tablet (4 mg total) by mouth every 8 (eight) hours as needed for Nausea.            Sebastián Deluna NP  [facility]  Department of Vascular Neurology   [facility]

## 2019-07-27 NOTE — PT/OT/SLP EVAL
"Occupational Therapy   Evaluation and Discharge Note    Name: Dae Amador  MRN: [id number]  Admitting Diagnosis:  Aphasia      Recommendations:     Discharge Recommendations: home  Discharge Equipment Recommendations:  none  Barriers to discharge:  None    Assessment:     Dae Amador is a 39 y.o. male with a medical diagnosis of Aphasia. At this time, patient is functioning at their prior level of function and does not require further acute OT services.     Plan:     During this hospitalization, patient does not require further acute OT services.  Please re-consult if situation changes.    · Plan of Care Reviewed with: patient    Subjective     Patient:  "Yesterday I felt tingling in my right arm.  I called 911.  Then my whole side got numb.  Wife:  "His CT and MRI were negative."    Occupational Profile:  Patient resides in [place] with wife and 19 month old dtg in 2 story home with bedroom on the first floor.  PTA patient independent with ADLs including driving.  Currently owns no DME.  Patient is right handed.  Works:  Accounting.  Hobbies: yardwork, running.  Roles/Responsibilities:  Father, , , cutting grass.     Pain/Comfort:  · Pain Rating 1: 0/10  · Pain Rating Post-Intervention 1: 0/10    Patients cultural, spiritual, Episcopal conflicts given the current situation: no    Objective:     Communicated with: Nurse prior to session.  Patient found supine with telemetry upon OT entry to room.    General Precautions: Standard, aspiration, NPO, fall   Orthopedic Precautions:N/A   Braces: N/A     Occupational Performance:    Bed Mobility:    · Patient completed Rolling/Turning to Left with  independence  · Patient completed Rolling/Turning to Right with independence  · Patient completed Scooting/Bridging with independence  · Patient completed Supine to Sit with independence  · Patient completed Sit to Supine with independence    Functional Mobility/Transfers:  · Patient completed Sit <> " Stand Transfer with independence  with  no assistive device   · Patient completed Bed <> Chair Transfer using Stand Pivot technique with independence with no assistive device    Activities of Daily Living:  · Grooming: independence    · Upper Body Dressing: independence    · Lower Body Dressing: independence    · Toileting: independence      Cognitive/Visual Perceptual:  Cognitive/Psychosocial Skills:     -       Oriented to: Person, Place, Time and Situation   -       Follows Commands/attention:Follows two-step commands  -       Communication: clear/fluent  -       Memory: No Deficits noted  -       Safety awareness/insight to disability: intact   -       Mood/Affect/Coping skills/emotional control: Appropriate to situation and Cooperative  Visual/Perceptual:      -Intact      Physical Exam:  Postural examination/scapula alignment:    -       Rounded shoulders  Skin integrity: Visible skin intact  Edema:  None noted  Sensation:    -       Intact  Upper Extremity Range of Motion:     -       Right Upper Extremity: WNL  -       Left Upper Extremity: WNL  Upper Extremity Strength:    -       Right Upper Extremity: WNL  -       Left Upper Extremity: WNL    AMPAC 6 Click ADL:  AMPAC Total Score: 24    Treatment & Education:  Patient/ Family education provided for stroke warning signs, prevention guidelines and personal risk factors.  Patient/ Family verbalizing understanding via teach back method. Patient education provided on role of OT and need for no further OT upon discharge.  Continued education, patient/ family training recommended.  Patient alert and oriented x 3; able to follow 4/4 one step commands.  Patient attentive and interactive throughout the session.  Patient able to identify 5/5 body parts.  Able to name 5/5 objects.  Able to sequence 7/7 days of the week and 12/12 months of the year.  Able to recall 5/5 words following 2 minutes.  Able to serial subtract 7 from 100.  Patient's functional status and  disposition recommendation discussed with stroke team. No balance deficits noted with unilateral stance. OT asked if there were any other questions; patient/ family had no further questions.   Education:    Patient left supine with all lines intact and call button in reach    GOALS:   Multidisciplinary Problems     Occupational Therapy Goals        Problem: Occupational Therapy Goal    Goal Priority Disciplines Outcome Interventions   Occupational Therapy Goal     OT, PT/OT     Description:  Goals not set 2* no further OT needed.                    History:     History reviewed. No pertinent past medical history.    Past Surgical History:   Procedure Laterality Date    FINGER FRACTURE SURGERY Right     index finger, flag football injury       Time Tracking:     OT Date of Treatment: 07/27/19  OT Start Time: 0649  OT Stop Time: 0709  OT Total Time (min): 20 min    Billable Minutes:Evaluation 10  Cognitive Retraining 10    JACKLYN Brooks  7/27/2019

## 2019-07-27 NOTE — ASSESSMENT & PLAN NOTE
39 yr old male with no vascular risk factors with acute onset RSW/N with facial droop and aphasia. LKN 12:20PM. On arrival to the ER, his symptoms had improved. NIHSS was 0. VAN-. He did appears to have questionable weakness in the RUE and was still complaining of right sided numbness (sensory exam was normal). He was alert and oriented x3 and following simple commands. However, he was unable to follow 2 step commands. Also appeared to have apraxia, acalculia with poor attention and concentration.   Of note, he was able to give a reasonable history of his presentation but appeared frustrated when asked to perform a complex task or calculation as he was unable to do so.  CT head and MRI Ischemic Protocol were both unremarkable save for an incidentally detected L occipital venous anomaly. MRI brain w/ w/o contrast no definite venous anomaly.    -- As this is second episode of transient neurological deficits, will admit for stroke workup, notably TTE.      Antithrombotics for secondary stroke prevention: Antiplatelets: Aspirin: 81 mg daily    Statins for secondary stroke prevention and hyperlipidemia, if present:   Statins: Atorvastatin- 20  mg daily    Aggressive risk factor modification: None     Rehab efforts: The patient has been evaluated by a stroke team provider and the therapy needs have been fully considered based off the presenting complaints and exam findings. The following therapy evaluations are needed: SLP evaluate and treat - regular diet    Diagnostics ordered/pending: TTE to assess cardiac function/status     VTE prophylaxis: Heparin 5000 units SQ every 8 hours, SCDs    BP parameters: SBP<140

## 2019-07-27 NOTE — CONSULTS
Food & Nutrition  Education    Diet Education: Cardiac  Time Spent: 15 minutes  Learners: Pt    Nutrition Education provided with handouts: Yes    Comments: Provided and discussed handouts on low Na and cardiac TLC diets. Reviewed Na restriction, foods high in sodium, and encouraged pt to flavor foods with herbs/spices/salt-free seasonings instead of salt. Encouraged pt to limit saturated/trans fat intake and consume more omega-3 fats. Reviewed food sources of these fats.     All questions and concerns answered. Dietitian's contact information provided.     Follow-Up: Yes    Please Re-consult as needed  Thanks!

## 2019-07-27 NOTE — NURSING
Patient received discharge orders. Discharge instructions reviewed with family and patient. Patent demonstrated understanding of discharge instructions. IV and tele removed. Waiting for transportation to be discharged home with family. Hectortm.

## 2019-07-27 NOTE — PROGRESS NOTES
[facility]  Vascular Neurology  Comprehensive Stroke Center  Progress Note    Assessment/Plan:     * Aphasia  39 yr old male with no vascular risk factors with acute onset RSW/N with facial droop and aphasia. LKN 12:20PM. On arrival to the ER, his symptoms had improved. NIHSS was 0. VAN-. He did appears to have questionable weakness in the RUE and was still complaining of right sided numbness (sensory exam was normal). He was alert and oriented x3 and following simple commands. However, he was unable to follow 2 step commands. Also appeared to have apraxia, acalculia with poor attention and concentration.   Of note, he was able to give a reasonable history of his presentation but appeared frustrated when asked to perform a complex task or calculation as he was unable to do so.  CT head and MRI Ischemic Protocol were both unremarkable save for an incidentally detected L occipital venous anomaly. MRI brain w/ w/o contrast no definite venous anomaly.    -- As this is second episode of transient neurological deficits, will admit for stroke workup, notably TTE.      Antithrombotics for secondary stroke prevention: Antiplatelets: Aspirin: 81 mg daily    Statins for secondary stroke prevention and hyperlipidemia, if present:   Statins: Atorvastatin- 20  mg daily    Aggressive risk factor modification: None     Rehab efforts: The patient has been evaluated by a stroke team provider and the therapy needs have been fully considered based off the presenting complaints and exam findings. The following therapy evaluations are needed: SLP evaluate and treat - regular diet    Diagnostics ordered/pending: TTE to assess cardiac function/status     VTE prophylaxis: Heparin 5000 units SQ every 8 hours, SCDs    BP parameters: SBP<140        Hyperlipidemia  Stroke risk factor   LDL 75  Atorvastatin 20         7/26 - TIA. Admitted to observation for stroke workup.  7/27 - Echo complete, results pending. MRI brain w/  contrast to r/o DVA, negative. OT cleared home no therapy needs. SLP cleared regular, thin diet. Ambulating well in room. NAEON. Discharge home today.     STROKE DOCUMENTATION   Acute Stroke Times   Last Known Normal Date: 07/26/19  Last Known Normal Time: 1220  Symptom Onset Date: 07/26/19  Symptom Onset Time: 1229  Stroke Team Called Date: 07/26/19  Stroke Team Called Time: 1251  Stroke Team Arrival Date: 07/26/19  Stroke Team Arrival Time: 1254    NIH Scale:  1a. Level of Consciousness: 0-->Alert, keenly responsive  1b. LOC Questions: 0-->Answers both questions correctly  1c. LOC Commands: 0-->Performs both tasks correctly  2. Best Gaze: 0-->Normal  3. Visual: 0-->No visual loss  4. Facial Palsy: 0-->Normal symmetrical movements  5a. Motor Arm, Left: 0-->No drift, limb holds 90 (or 45) degrees for full 10 secs  5b. Motor Arm, Right: 0-->No drift, limb holds 90 (or 45) degrees for full 10 secs  6a. Motor Leg, Left: 0-->No drift, leg holds 30 degree position for full 5 secs  6b. Motor Leg, Right: 0-->No drift, leg holds 30 degree position for full 5 secs  7. Limb Ataxia: 0-->Absent  8. Sensory: 0-->Normal, no sensory loss  9. Best Language: 0-->No aphasia, normal  10. Dysarthria: 0-->Normal  11. Extinction and Inattention (formerly Neglect): 0-->No abnormality  Total (NIH Stroke Scale): 0       Modified Aga Score: 0  Augustine Coma Scale:    ABCD2 Score:    [id number] Score:   HAS -BLED Score:   ICH Score:   [employer] Classification:      Hemorrhagic change of an Ischemic Stroke: Does this patient have an ischemic stroke with hemorrhagic changes? No     Neurologic Chief Complaint: RSW, R facial droop, aphasia    Subjective:     Interval History: Patient is seen for follow-up neurological assessment and treatment recommendations: Echo complete, results pending. MRI brain w/ contrast to r/o DVA, negative. OT cleared home no therapy needs. SLP cleared regular, thin diet. Ambulating well in room. NAEON. Discharge  home today.     HPI, Past Medical, Family, and Social History remains the same as documented in the initial encounter.     Review of Systems   Constitutional: Negative for chills and fever.   HENT: Negative for trouble swallowing and voice change.    Eyes: Negative for visual disturbance.   Respiratory: Negative for shortness of breath.    Cardiovascular: Negative for chest pain.   Gastrointestinal: Negative for nausea and vomiting.   Neurological: Negative for facial asymmetry, speech difficulty, weakness, numbness and headaches.   Psychiatric/Behavioral: Negative for confusion and decreased concentration.     Scheduled Meds:   aspirin  81 mg Oral Daily    atorvastatin  20 mg Oral Daily    enoxaparin  40 mg Subcutaneous Daily     Continuous Infusions:   sodium chloride 0.9%       PRN Meds:labetalol, ondansetron, polyethylene glycol, sodium chloride 0.9%, sodium chloride 0.9%    Objective:     Vital Signs (Most Recent):  Temp: 98.4 °F (36.9 °C) (07/27/19 1217)  Pulse: 80 (07/27/19 1217)  Resp: 16 (07/27/19 1217)  BP: 125/76 (07/27/19 1217)  SpO2: 99 % (07/27/19 1217)  BP Location: Left arm    Vital Signs Range (Last 24H):  Temp:  [97.5 °F (36.4 °C)-98.6 °F (37 °C)]   Pulse:  [70-88]   Resp:  [16-18]   BP: (100-125)/(57-84)   SpO2:  [97 %-99 %]   BP Location: Left arm    Physical Exam   Constitutional: He is oriented to person, place, and time. He appears well-developed.   HENT:   Head: Normocephalic.   Eyes: Pupils are equal, round, and reactive to light.   Neck: Normal range of motion.   Cardiovascular: Normal rate.   Pulmonary/Chest: Effort normal.   Abdominal: Soft.   Musculoskeletal: Normal range of motion.   Neurological: He is alert and oriented to person, place, and time.   Skin: Skin is warm and dry.   Psychiatric: He has a normal mood and affect.   Vitals reviewed.      Neurological Exam:   LOC: alert  Attention Span: Good   Language: No aphasia  Articulation: No dysarthria  Orientation: Person, Place,  Time   Visual Fields: Full  EOM (CN III, IV, VI): Full/intact  Facial Movement (CN VII): Symmetric facial expression    Motor: Arm left  Normal 5/5  Leg left  Normal 5/5  Arm right  Normal 5/5  Leg right Normal 5/5  Cebellar: No evidence of appendicular or axial ataxia  Tone: Normal tone throughout    Laboratory:  CMP:   Recent Labs   Lab 07/27/19  0441   CALCIUM 9.2   ALBUMIN 3.7   PROT 6.3      K 4.6   CO2 28      BUN 11   CREATININE 0.9   ALKPHOS 53*   ALT 81*   AST 38   BILITOT 0.3     CBC:   Recent Labs   Lab 07/27/19  0441   WBC 4.97   RBC 4.62   HGB 12.6*   HCT 39.8*   *   MCV 86   MCH 27.3   MCHC 31.7*     Lipid Panel:   Recent Labs   Lab 07/26/19  1308   CHOL 120   LDLCALC 75.4   HDL 26*   TRIG 93     Hgb A1C:   Recent Labs   Lab 07/26/19  1308   HGBA1C 5.4     TSH:   Recent Labs   Lab 07/26/19  1308   TSH 1.324       Diagnostic Results        Brain/Vessel imaging:     MRI brain W/ W/O Contrast 7/27/19  No acute intracranial abnormality.    No definite DVA identified.    MRI Ischemic Protocol 7/25/19 -      No evidence of acute infarct or stenosis.     CT Head 7/26/19 -      There is no evidence of acute major vascular territory infarct, hemorrhage, or mass.  There is no hydrocephalus.  There are no abnormal extra-axial fluid collections.  There are a few punctate foci of hypoattenuation within the periphery of the inferior most left occipital lobe, noting these are in a region of artifact and best visualized on the thin cut images.  Findings are nonspecific.  The paranasal sinuses and mastoid air cells are clear, and there is no evidence of calvarial fracture.  The visualized soft tissues are unremarkable.     Cardiac Evaluation:      TTE  7/27/19 results pending      Sebastián Deluna NP  [facility]  Department of Vascular Neurology   [facility]

## 2019-07-27 NOTE — PT/OT/SLP EVAL
"Speech Language Pathology Evaluation & Discharge Summary  Cognitive/Bedside Swallow    Patient Name:  Dae Amador   MRN:  [id number]  Admitting Diagnosis: Aphasia    Recommendations:                  General Recommendations:  Follow-up not indicated  Diet recommendations:  Regular, Thin   Aspiration Precautions: Standard aspiration precautions   General Precautions: Standard, fall  Communication strategies:  none    History:     History reviewed. No pertinent past medical history.    Past Surgical History:   Procedure Laterality Date    FINGER FRACTURE SURGERY Right     index finger, flag football injury       Social History: Patient lives with his spouse and daughter in [place].    Prior Intubation HX:  None this admission    Modified Barium Swallow: None on file    Chest X-Rays 7/26/19: Lung zones remain clear, and are free of significant airspace consolidation or volume loss.  No pleural fluid.    Prior diet: regular/thin.    Occupation/hobbies/homemaking: Patient is an . In his free time he enjoys watching sports.     Subjective     Patient awake and alert during session  "I'm feeling like myself again. None of the problems I had yesterday."  Communicated with nurse prior to session     Pain/Comfort:  · Pain Rating 1: 0/10  · Pain Rating Post-Intervention 1: 0/10    Objective:     Cognitive Status:    Arousal/Alertness Appropriate response to stimuli  Attention No obvious deficits observed throughout session  Orientation Oriented x4  Memory Immediate Recall WFL, Delayed Recall: WFL and recall general information WFL  Problem Solving Conclusions WFL, Categories WFL and Sequencing WFL  Simple calculation WFL      Receptive Language:   Comprehension:      Questions Complex yes/no WFL  Open ended questions WFL  Commands  multistep basic commands WFL  complex/abstract commands WFL  Conversation WFL    Pragmatics:    WFL    Expressive Language:  Verbal:    Repetition Sentences WFL  Naming Confrontation " WFL, Convergent WFL, Divergent responsive WFL and Single word responsive naming WFL  Conversational speech WFL      Motor Speech:  WFL    Voice:   WFL    Visual-Spatial:  WFL    Reading:   WFL     Written Expression:   WFL    Oral Musculature Evaluation  · Oral Musculature: WFL  · Dentition: present and adequate  · Secretion Management: problems swallowing secretions  · Mucosal Quality: good  · Mandibular Strength and Mobility: WFL  · Oral Labial Strength and Mobility: WFL  · Lingual Strength and Mobility: WFL  · Buccal Strength and Mobility: WFL  · Volitional Cough: strong  · Volitional Swallow: timely; good laryngeal elevation  · Voice Prior to PO Intake: WFL    Bedside Swallow Eval:   Consistencies Assessed:  · Thin liquids x6 (via straw)  · Solids x4 (mindi crackers)     Oral Phase:   · WFL    Pharyngeal Phase:   · no overt clinical signs/symptoms of aspiration  · no overt clinical signs/symptoms of pharyngeal dysphagia    Compensatory Strategies  · None    Treatment: Patient seen for bedside swallow eval and a speech/language/cognitive assessment. He was sitting up in bed with his spouse present during session. Patient and spouse reported that he was back at baseline from a ST perspective. SLP educated them regarding role of ST and they both verbalized understanding. No further questions. Whiteboard updated. Patient left in room with call light within reach.    Assessment:     Dae Amador is a 39 y.o. male who is at baseline from a ST perspective.     Goals:   Multidisciplinary Problems     SLP Goals        Problem: SLP Goal    Goal Priority Disciplines Outcome   SLP Goal     SLP                    Plan:     · Plan of Care reviewed with:  patient, spouse   · SLP Follow-Up:  No       Discharge recommendations:  Discharge Facility/Level of Care Needs: home   Barriers to Discharge:  None    Time Tracking:     SLP Treatment Date:   07/27/19  Speech Start Time:  1333  Speech Stop Time:  1350     Speech Total Time  (min):  17 min    Billable Minutes: Eval 9  and Eval Swallow and Oral Function 8    Meliton Tom CCC-SLP  Speech-Language Pathology  Pager: [phone]   07/27/2019

## 2019-07-29 ENCOUNTER — TELEPHONE (OUTPATIENT)
Dept: NEUROLOGY | Facility: CLINIC | Age: 39
End: 2019-07-29

## 2019-07-29 ENCOUNTER — HOSPITAL ENCOUNTER (EMERGENCY)
Facility: HOSPITAL | Age: 39
Discharge: HOME | End: 2019-07-29
Attending: EMERGENCY MEDICINE
Payer: COMMERCIAL

## 2019-07-29 VITALS
HEART RATE: 74 BPM | DIASTOLIC BLOOD PRESSURE: 64 MMHG | SYSTOLIC BLOOD PRESSURE: 104 MMHG | TEMPERATURE: 98 F | RESPIRATION RATE: 16 BRPM | OXYGEN SATURATION: 99 % | WEIGHT: 172 LBS | BODY MASS INDEX: 24.62 KG/M2 | HEIGHT: 70 IN

## 2019-07-29 DIAGNOSIS — R53.1 WEAKNESS: Primary | ICD-10-CM

## 2019-07-29 PROBLEM — R29.90 EPISODE OF TRANSIENT NEUROLOGIC SYMPTOMS: Status: ACTIVE | Noted: 2019-07-29

## 2019-07-29 LAB
ALBUMIN SERPL BCP-MCNC: 4.2 G/DL (ref 3.5–5.2)
ALP SERPL-CCNC: 55 U/L (ref 55–135)
ALT SERPL W/O P-5'-P-CCNC: 73 U/L (ref 10–44)
ANION GAP SERPL CALC-SCNC: 7 MMOL/L (ref 8–16)
AST SERPL-CCNC: 33 U/L (ref 10–40)
BASOPHILS # BLD AUTO: 0.04 K/UL (ref 0–0.2)
BASOPHILS NFR BLD: 0.6 % (ref 0–1.9)
BILIRUB SERPL-MCNC: 0.5 MG/DL (ref 0.1–1)
BUN SERPL-MCNC: 10 MG/DL (ref 6–20)
CALCIUM SERPL-MCNC: 9.1 MG/DL (ref 8.7–10.5)
CHLORIDE SERPL-SCNC: 100 MMOL/L (ref 95–110)
CO2 SERPL-SCNC: 28 MMOL/L (ref 23–29)
CREAT SERPL-MCNC: 0.8 MG/DL (ref 0.5–1.4)
CREAT SERPL-MCNC: 0.9 MG/DL (ref 0.5–1.4)
DIFFERENTIAL METHOD: ABNORMAL
EOSINOPHIL # BLD AUTO: 0.1 K/UL (ref 0–0.5)
EOSINOPHIL NFR BLD: 0.8 % (ref 0–8)
ERYTHROCYTE [DISTWIDTH] IN BLOOD BY AUTOMATED COUNT: 12 % (ref 11.5–14.5)
EST. GFR  (AFRICAN AMERICAN): >60 ML/MIN/1.73 M^2
EST. GFR  (NON AFRICAN AMERICAN): >60 ML/MIN/1.73 M^2
GLUCOSE SERPL-MCNC: 107 MG/DL (ref 70–110)
HCT VFR BLD AUTO: 41 % (ref 40–54)
HGB BLD-MCNC: 13.1 G/DL (ref 14–18)
IMM GRANULOCYTES # BLD AUTO: 0 K/UL (ref 0–0.04)
IMM GRANULOCYTES NFR BLD AUTO: 0 % (ref 0–0.5)
LYMPHOCYTES # BLD AUTO: 3 K/UL (ref 1–4.8)
LYMPHOCYTES NFR BLD: 46.9 % (ref 18–48)
MAGNESIUM SERPL-MCNC: 2.1 MG/DL (ref 1.6–2.6)
MCH RBC QN AUTO: 27.3 PG (ref 27–31)
MCHC RBC AUTO-ENTMCNC: 32 G/DL (ref 32–36)
MCV RBC AUTO: 86 FL (ref 82–98)
MONOCYTES # BLD AUTO: 0.6 K/UL (ref 0.3–1)
MONOCYTES NFR BLD: 9.3 % (ref 4–15)
NEUTROPHILS # BLD AUTO: 2.8 K/UL (ref 1.8–7.7)
NEUTROPHILS NFR BLD: 42.4 % (ref 38–73)
NRBC BLD-RTO: 0 /100 WBC
PHOSPHATE SERPL-MCNC: 3.2 MG/DL (ref 2.7–4.5)
PLATELET # BLD AUTO: 157 K/UL (ref 150–350)
PMV BLD AUTO: 11.8 FL (ref 9.2–12.9)
POC PTINR: 1 (ref 0.9–1.2)
POC PTWBT: 12.3 SEC (ref 9.7–14.3)
POCT GLUCOSE: 107 MG/DL (ref 70–110)
POTASSIUM SERPL-SCNC: 4 MMOL/L (ref 3.5–5.1)
PROT SERPL-MCNC: 7 G/DL (ref 6–8.4)
RBC # BLD AUTO: 4.79 M/UL (ref 4.6–6.2)
SAMPLE: NORMAL
SAMPLE: NORMAL
SODIUM SERPL-SCNC: 135 MMOL/L (ref 136–145)
VIT B12 SERPL-MCNC: 455 PG/ML (ref 210–950)
WBC # BLD AUTO: 6.48 K/UL (ref 3.9–12.7)

## 2019-07-29 PROCEDURE — 86618 LYME DISEASE ANTIBODY: CPT

## 2019-07-29 PROCEDURE — 99285 EMERGENCY DEPT VISIT HI MDM: CPT | Mod: 25

## 2019-07-29 PROCEDURE — 99291 CRITICAL CARE FIRST HOUR: CPT | Mod: ,,, | Performed by: EMERGENCY MEDICINE

## 2019-07-29 PROCEDURE — 86617 LYME DISEASE ANTIBODY: CPT | Mod: 59

## 2019-07-29 PROCEDURE — 99291 PR CRITICAL CARE, E/M 30-74 MINUTES: ICD-10-PCS | Mod: ,,, | Performed by: EMERGENCY MEDICINE

## 2019-07-29 PROCEDURE — 99284 PR EMERGENCY DEPT VISIT,LEVEL IV: ICD-10-PCS | Mod: ,,, | Performed by: PSYCHIATRY & NEUROLOGY

## 2019-07-29 PROCEDURE — 63600175 PHARM REV CODE 636 W HCPCS: Performed by: EMERGENCY MEDICINE

## 2019-07-29 PROCEDURE — 85610 PROTHROMBIN TIME: CPT

## 2019-07-29 PROCEDURE — 82607 VITAMIN B-12: CPT

## 2019-07-29 PROCEDURE — 80053 COMPREHEN METABOLIC PANEL: CPT

## 2019-07-29 PROCEDURE — 96374 THER/PROPH/DIAG INJ IV PUSH: CPT

## 2019-07-29 PROCEDURE — 83735 ASSAY OF MAGNESIUM: CPT

## 2019-07-29 PROCEDURE — 82962 GLUCOSE BLOOD TEST: CPT

## 2019-07-29 PROCEDURE — 82565 ASSAY OF CREATININE: CPT

## 2019-07-29 PROCEDURE — 84100 ASSAY OF PHOSPHORUS: CPT

## 2019-07-29 PROCEDURE — 85025 COMPLETE CBC W/AUTO DIFF WBC: CPT

## 2019-07-29 PROCEDURE — 99284 EMERGENCY DEPT VISIT MOD MDM: CPT | Mod: ,,, | Performed by: PSYCHIATRY & NEUROLOGY

## 2019-07-29 PROCEDURE — 86592 SYPHILIS TEST NON-TREP QUAL: CPT

## 2019-07-29 RX ORDER — MIDAZOLAM HYDROCHLORIDE 1 MG/ML
1 INJECTION INTRAMUSCULAR; INTRAVENOUS
Status: COMPLETED | OUTPATIENT
Start: 2019-07-29 | End: 2019-07-29

## 2019-07-29 RX ADMIN — MIDAZOLAM HYDROCHLORIDE 1 MG: 1 INJECTION, SOLUTION INTRAMUSCULAR; INTRAVENOUS at 03:07

## 2019-07-29 NOTE — PLAN OF CARE
07/29/19 0753   Final Note   Assessment Type Final Discharge Note   Anticipated Discharge Disposition Home   Right Care Referral Info   Post Acute Recommendation No Care

## 2019-07-29 NOTE — ASSESSMENT & PLAN NOTE
-Recent TIA with R facial droop, RSW, aphasia on 07/26/19  -Symptoms today are very unlikely to be consistent with TIA, bilateral symptoms.  Subjective numbness, involving entire tongue.  For similar reasons, also very unlikely to be 2/2 seizure.  -Ddx:  Presyncopal symptoms, post-viral neuropathy, conversion disorder, medication side effect  -Recent normal EKG and Echo.  Could consider outpatient tilt table test--pt may be more prone to presyncope and syncope in light of recent viral illness with weight loss and dehydration.  -Reschedule follow up appt with Dr. Sloan, can contact me if unable to get in soon and will try to have patient seen in resident clinic with stroke attending to staff  -Continue daily ASA, reduce stroke risk factors w/ atorvastatin

## 2019-07-29 NOTE — HPI
38 yo M with PMHx of colitis recently treated with ciprofloxacin and metronidazole and admission to [facility] [place] [place] for TIA on 7/26/19 presents to [facility] [place] [place] ED 07/29/19 due to onset of numbness/tingling that he was concerned could represent another TIA.  He was at work then felt light-headed, had a few snacks and tried to lie down to see if he would feel better.  Then noticed numbness in BUE and entire tongue, felt like he almost had trouble breathing.  This persisted and he discussed with his wife, who is a CRNA at [employer], and they discussed whether to wait for appt with Dr. Sloan at 3 pm or to go to ED.  Patient felt like symptoms were progressing so wanted to get to ED in case he needed IV tPA.  On arrival to ED, CT scan was unremarkable.  Exam notable only for subjective numbness/tingling in LUE and entire tongue.  No weakness, no aphasia, no visual deficits.  He had an admission and work up for event this past Friday which involved R facial droop, R-sided weakness, and aphasia.  He had completely resolved in terms of those symptoms.  He has intermittently noted BUE numbness/tingling to wife, at least 1-2 times over the weekend.  They are concerned that this could be either related to virus, another TIA, seizure, or related to chronic neck pain and issues he is aware of with cervical disc disease.  No interventions, plan for MRI neck and further work up.  Dr. Sloan made aware of patient in ED.

## 2019-07-29 NOTE — TELEPHONE ENCOUNTER
Patient was just seen in the ED on 07/26/19 patient was d/c and was told to f/u with Dr. Conte in 4 to 6 weeks. Patient was informed that being seen today is clinic is not needed and if symptoms are worsened they should return to the ED. Patient declined and make an appointment with Dr. Sloan. (Patients wife). Patient was informed that he could not go back and forth between providers and if the patient is having symptoms the clinic wouldn't be best the ED would. Patient declined information.

## 2019-07-29 NOTE — ED TRIAGE NOTES
"Pt. Presents to ED today with c/o left sided arm numbness and tingling since "before 1100" today. Pt. Also c/o tingling to bilateral tongue. No airway obstructions, speaking full sentences, non labored breathing, sating 99% RA. Pt. Recently admitted and discharged from hospital with TIA dx. No headache, a&ox4.   "

## 2019-07-29 NOTE — TELEPHONE ENCOUNTER
----- Message from [place] sent at 7/29/2019  8:30 AM CDT -----  Contact: Desiree (wife) @ [phone]  Pt was discharged from the hospital on 7-26-18 and advised to f/u in 4-6 weeks.  Pts wife says he is having symptoms and needs to be seen today.  Pls call.

## 2019-07-29 NOTE — ED NOTES
Awaiting MRI. Will continue to monitor frequently. Wife at bs. On cardiac and o2 monitor. Call light within reach, will continue to monitor frequently.

## 2019-07-29 NOTE — DISCHARGE INSTRUCTIONS
The cause of your symptoms are unclear at this time.   Your labs, and MRI today did not show any signs of dangerous medical conditions.    The Neurology team will arrange a prompt follow-up with your neurologist.    If you have any new symptoms such as weakness trouble talking, trouble moving part her body, trouble walking, or any other new, recurring, or worrisome symptoms please return immediately to the emergency department for re-evaluation.    The test for syphilis still pending.  Can be followed up by your next visit.

## 2019-07-29 NOTE — CONSULTS
[facility]  Vascular Neurology  Comprehensive Stroke Center  Consult Note    Inpatient consult to Vascular Neurology  Consult performed by: Raquel Peralta MD  Consult ordered by: Heri Merrill MD        Assessment/Plan:     38 yo M with PMHx of colitis recently treated with ciprofloxacin and metronidazole and admission to [facility] for TIA on 7/26/19 presents to [facility] ED 07/29/19 due to onset of numbness/tingling that he was concerned could represent another TIA.     Episode of transient neurologic symptoms  -Recent TIA with R facial droop, RSW, aphasia on 07/26/19. Now BUE numbness.  -Very unlikely to be TIA--bilateral sxs of subjective numbness including entire tongue.     -For similar reasons, also unlikely 2/2 seizure.  -Ddx:  Presyncope, post-viral neuropathy, conversion disorder, medication side effect  -Recent normal EKG and Echo.  Consider outpt tilt table test--pt may be more prone to presyncope and syncope in light of recent viral illness with weight loss and dehydration.  -Reschedule follow up appt with Dr. Sloan--contact me if unable to get in soon and will try to have patient seen in resident clinic with stroke attending to staff  -Continue daily ASA, reduce stroke risk factors w/ atorvastatin    Hyperlipidemia  -Continue atorvastatin 20 mg qd    STROKE DOCUMENTATION     Acute Stroke Times   Last Known Normal Date: 07/29/19  Last Known Normal Time: 1100  Symptom Onset Date: 07/29/19  Symptom Onset Time: 1105  Stroke Team Called Date: 07/29/19  Stroke Team Called Time: 1205  Stroke Team Arrival Date: 07/29/19  Stroke Team Arrival Time: 1207  CT Interpretation Time: 1210  Decision to Treat Time for Alteplase: (No IV tPA)  Decision to Treat Time for IR: (No thrombectomy)    NIH Scale:  Interval: baseline  1a. Level of Consciousness: 0-->Alert, keenly responsive  1b. LOC Questions: 0-->Answers both questions correctly  1c. LOC Commands: 0-->Performs both tasks correctly  2.  Best Gaze: 0-->Normal  3. Visual: 0-->No visual loss  4. Facial Palsy: 0-->Normal symmetrical movements  5a. Motor Arm, Left: 0-->No drift, limb holds 90 (or 45) degrees for full 10 secs  5b. Motor Arm, Right: 0-->No drift, limb holds 90 (or 45) degrees for full 10 secs  6a. Motor Leg, Left: 0-->No drift, leg holds 30 degree position for full 5 secs  6b. Motor Leg, Right: 0-->No drift, leg holds 30 degree position for full 5 secs  7. Limb Ataxia: 0-->Absent  8. Sensory: 1-->Mild-to-moderate sensory loss, patient feels pinprick is less sharp or is dull on the affected side, or there is a loss of superficial pain with pinprick, but patient is aware of being touched  9. Best Language: 0-->No aphasia, normal  10. Dysarthria: 0-->Normal  11. Extinction and Inattention (formerly Neglect): 0-->No abnormality  Total (NIH Stroke Scale): 1    Modified Aga Score: 1  Augustine Coma Scale:15   ABCD2 Score:    [id number] Score:   HAS -BLED Score:   ICH Score:   [employer] Classification:     Thrombolysis Candidate? No, Strong suspicion for stroke mimic or alternative diagnosis     Delays to Thrombolysis?  No    Interventional Revascularization Candidate?   Is the patient eligible for mechanical endovascular reperfusion (TWIN)?  No; No large vessel occlusion and No; No significant neurological deficit      Hemorrhagic change of an Ischemic Stroke: Does this patient have an ischemic stroke with hemorrhagic changes? No     Subjective:     History of Present Illness:  40 yo M with PMHx of colitis recently treated with ciprofloxacin and metronidazole and admission to [facility] for TIA on 7/26/19 presents to [facility] ED 07/29/19 due to onset of numbness/tingling that he was concerned could represent another TIA.  He was at work then felt light-headed, had a few snacks and tried to lie down to see if he would feel better.  Then noticed numbness in BUE and entire tongue, felt like he almost had trouble breathing.  This persisted and he discussed  with his wife, who is a CRNA at [employer], and they discussed whether to wait for appt with Dr. Sloan at 3 pm or to go to ED.  Patient felt like symptoms were progressing so wanted to get to ED in case he needed IV tPA.  On arrival to ED, CT scan was unremarkable.  Exam notable only for subjective numbness/tingling in LUE and entire tongue.  No weakness, no aphasia, no visual deficits.  He had an admission and work up for event this past Friday which involved R facial droop, R-sided weakness, and aphasia.  He had completely resolved in terms of those symptoms.  He has intermittently noted BUE numbness/tingling to wife, at least 1-2 times over the weekend.  They are concerned that this could be either related to virus, another TIA, seizure, or related to chronic neck pain and issues he is aware of with cervical disc disease.  No interventions, plan for MRI neck and further work up.  Dr. Sloan made aware of patient in ED.        No past medical history on file.  Past Surgical History:   Procedure Laterality Date    FINGER FRACTURE SURGERY Right     index finger, flag football injury     Family History   Problem Relation Age of Onset    No Known Problems Mother     No Known Problems Father     Cancer Maternal Grandmother         unknown details    Melanoma Maternal Grandfather     Diabetes Paternal Grandmother     No Known Problems Paternal Grandfather      Social History     Tobacco Use    Smoking status: Never Smoker    Smokeless tobacco: Never Used   Substance Use Topics    Alcohol use: Yes    Drug use: Never     Review of patient's allergies indicates:  No Known Allergies    Medications: I have reviewed the current medication administration record.    No current facility-administered medications for this encounter.     Current Outpatient Medications:     aspirin (ECOTRIN) 81 MG EC tablet, Take 1 tablet (81 mg total) by mouth once daily., Disp: , Rfl: 0    atorvastatin (LIPITOR) 20 MG tablet, Take 1  tablet (20 mg total) by mouth once daily., Disp: 90 tablet, Rfl: 3    ondansetron (ZOFRAN) 4 MG tablet, Take 1 tablet (4 mg total) by mouth every 8 (eight) hours as needed for Nausea., Disp: 30 tablet, Rfl: 1    Review of Systems   Constitutional: Negative for chills and fever.   HENT: Negative for sneezing and voice change.    Eyes: Negative for photophobia and visual disturbance.   Respiratory: Negative for cough and shortness of breath.    Cardiovascular: Negative for palpitations and leg swelling.   Gastrointestinal: Negative for nausea and vomiting.   Skin: Negative for rash and wound.   Neurological: Positive for numbness. Negative for weakness.   Hematological: Negative for adenopathy. Does not bruise/bleed easily.   Psychiatric/Behavioral: Negative for agitation and confusion.     Objective:     Vital Signs (Most Recent):  Temp: 98.2 °F (36.8 °C) (07/29/19 1200)  Pulse: 74 (07/29/19 1750)  Resp: 16 (07/29/19 1750)  BP: 104/64 (07/29/19 1750)  SpO2: 99 % (07/29/19 1750)    Vital Signs Range (Last 24H):  Temp:  [98.2 °F (36.8 °C)]   Pulse:  [74-95]   Resp:  [16-19]   BP: (104-125)/(61-69)   SpO2:  [98 %-100 %]     Physical Exam  General:  Well-developed, well-nourished, nad  HEENT:  NCAT, PERRLA, EOMI, oropharyngeal membranes non-erythematous/without exudate  Neck:  Supple, normal ROM without nuchal rigidity  Resp:  Symmetric expansion, no increased wob  CVS:  No LE edema, peripheral pulses 2+ (radial, dorsalis pedis)  GI:  Abd soft, non-distended, non-tender to palpation  Neurologic Exam:  Mental Status:  AAOx3.  Speech, thought content appropriate.   Cranial Nerves:  VFs intact on counting fingers in all quadrants bilaterally.  PERRLA, EOMI.  Facial movement, sensation intact and symmetric.  Palate raises symmetrically, tongue protrudes midline.  Trapezius 5/5 bilaterally.  Motor:  Normal bulk and tone.  BUE shoulder abduction, biceps/triceps, wrist flexion/extension,  strength 5/5.  BLE hip flexors,  knee flexion/extension, plantarflexion/dorsiflexion 5/5.  No pronator drift.  Sensory:  Intact to light touch at all extremities and face without inattention.  Vibratory sensation intact at BUE/BLE digits.  Reflexes:  Biceps, brachioradialis, patellar brisk 2+ and symmetric.  No ankle clonus.  No Montgomery's.    Coordination:  FNF, SEBAS, HTS intact with no dysmetria/ataxia/dysdiadochokinesia.  No resting tremor or myoclonus.  Gait:  Normal balance with appropriate arm swing, normal posture, no shuffling, no magnetic gait.    Neurological Exam:   LOC: alert  Attention Span: Good   Language: No aphasia  Articulation: No dysarthria  Orientation: Person, Place, Time   Visual Fields: Full  EOM (CN III, IV, VI): Full/intact  Pupils (CN II, III): PERRL  Facial Sensation (CN V): Normal  Facial Movement (CN VII): Symmetric facial expression    Reflexes: 2+ throughout  Motor: Arm left  Normal 5/5  Leg left  Normal 5/5  Arm right  Normal 5/5  Leg right Normal 5/5  Cebellar: No evidence of appendicular or axial ataxia  Sensation: Intact to light touch, temperature and vibration  Subjective numbness/tingling in LUE, resolving during exam  Tone: Normal tone throughout    Laboratory:  CMP:   Recent Labs   Lab 19  1233   CALCIUM 9.1   ALBUMIN 4.2   PROT 7.0   *   K 4.0   CO2 28      BUN 10   CREATININE 0.9   ALKPHOS 55   ALT 73*   AST 33   BILITOT 0.5     CBC:   Recent Labs   Lab 19  1233   WBC 6.48   RBC 4.79   HGB 13.1*   HCT 41.0      MCV 86   MCH 27.3   MCHC 32.0     Lipid Panel:   Recent Labs   Lab 19  1308   CHOL 120   LDLCALC 75.4   HDL 26*   TRIG 93     Coagulation:   Recent Labs   Lab 19  1308 19  0441   INR 1.3*  --    APTT  --  23.4     Hgb A1C:   Recent Labs   Lab 19  1308   HGBA1C 5.4     TSH:   Recent Labs   Lab 19  1308   TSH 1.324     Diagnostic Results:    Brain imagin19 CT head w/o contrast:  FINDINGS:  Hypodense area in the nabil similar to prior  CT likely represents artifact in the posterior fossa.  The brain parenchyma is of normal attenuation with no evidence of intracranial hemorrhage, major vascular distribution infarct or mass effect.  There are no extra-axial masses or fluid collections.  The ventricular system is of normal size for age and midline.    Vessel Imaging:  Previous:  MRI Brain Ischemic Intervention Protocol--  1. No findings to suggest acute infarct or hemorrhage.  2. No hemodynamically significant stenosis, occlusion, av malformation or aneurysm of the anterior or posterior circulation, noting variant anatomy as described above.  3. Left occipital DVA.    Cardiac Evaluation:   07/27/19 TTE w/ CFD:  · Normal left ventricular systolic function. The estimated ejection fraction is 60%  · No wall motion abnormalities.  · Normal LV diastolic function.  · Normal right ventricular systolic function.  · Normal central venous pressure (3 mm Hg).    Raquel Peralta MD  Comprehensive Stroke Center  Department of Vascular Neurology   [facility]

## 2019-07-29 NOTE — ED PROVIDER NOTES
Encounter Date: 7/29/2019    SCRIBE #1 NOTE: I, Sarah Shields, am scribing for, and in the presence of,  Heri Merrill MD. I have scribed the following portions of the note - the EKG reading. Other sections scribed: HPI ROS .       History     Chief Complaint   Patient presents with    Numbness     Pt presents with left arm numbness and tongue numbness that began at 1118 this morning. Pt was diagnosed with a TIA this past Friday.      Time patient was seen by the provider: 12:05 PM      The patient is a 39 y.o. male with co-morbidities including: TIA (7/26/3019) who presents to the ED with a complaint of tongue numbness with associated bilateral upper extremity arm numbness. Stroke code was called. Patient reports waking up with tongue numbness. However it resolved. At approximately 11:18 AM, patient had left arm numbness and tongue numbness while at work. He also reports feeling dizzy and he had to sit down due to a near syncopal episode. Patient reports his left arm numbness has switched to the right arm. He was seen here on 7/26 where he had right-sided numbness, right sided weakness, and right sided facial droop. Upon evaluation, he denies headache. Patient reports having a syncopal episode a few weeks ago and was seen in the ED. His spouse reports having half of his face being numb in 2010 and was seen at [facility].     The history is provided by the patient and the spouse.     Review of patient's allergies indicates:  No Known Allergies  No past medical history on file.  Past Surgical History:   Procedure Laterality Date    FINGER FRACTURE SURGERY Right     index finger, flag football injury     Family History   Problem Relation Age of Onset    No Known Problems Mother     No Known Problems Father     Cancer Maternal Grandmother         unknown details    Melanoma Maternal Grandfather     Diabetes Paternal Grandmother     No Known Problems Paternal Grandfather      Social History      Tobacco Use    Smoking status: Never Smoker    Smokeless tobacco: Never Used   Substance Use Topics    Alcohol use: Yes    Drug use: Never     Review of Systems  Constitutional:  No Fever, No Chills,   Eyes: No Vision Changes  ENT/Mouth: No sore throat, No rhinorrhea  Cardiovascular:  No Chest Pain, No Palpitations  Respiratory:  No Cough, No SOB  Gastrointestinal:  No Nausea, No Vomiting, No Diarrhea, No abdo pain.  Genitourinary:  No  pain, No dysuria   Musculoskeletal:  No Arthralgias, No Back Pain, No Neck Pain, No recent trauma.  Skin:  No skin Lesions  Neuro:  No Weakness, Numbness to left arm and tongue, No Paresthesias, Dizziness, No Headache    Physical Exam     Initial Vitals   BP Pulse Resp Temp SpO2   07/29/19 1156 07/29/19 1156 07/29/19 1156 07/29/19 1200 07/29/19 1156   118/69 90 16 98.2 °F (36.8 °C) 100 %      MAP       --                Physical Exam   Physical Exam (preformed later in ED course)  GENERAL APPEARANCE: Well developed, well nourished, in no acute distress.  HENT: Normocephalic, atraumatic    EYES: Sclerae anicteric   NECK: Supple, no thyroid enlargement  CARDIOVASCULAR: Regular rate and rhythm without any murmurs, gallops, rubs.  LUNGS: Speaking in full sentences. Breathing comfortably. Auscultation of the lungs revealed normal breath sounds b/l  ABDOMEN: Soft and nontender, no masses, no rebound or guarding   NEUROLOGIC: Alert, interacting normally, CN2-12 intact, no dysmetria, 5/5 strength b/l upper and lower extremity, Normal sensation to light touch b/l upper and lower extremities distally. Normal gait.   MSK: Moving all four extremities.  Skin: Warm and dry. No visible rash on exposed areas of skin.    Psych: Mood and affect normal.       ED Course   Procedures  Labs Reviewed   COMPREHENSIVE METABOLIC PANEL   CBC W/ AUTO DIFFERENTIAL   MAGNESIUM   PHOSPHORUS   VITAMIN B12   RPR   POCT GLUCOSE   ISTAT PROCEDURE   ISTAT CREATININE     EKG Readings: (Independently  Interpreted)   Sinus rhythm at 95 with no specific ST abnormalities. Similar in prior to previous. Normal axis.        Imaging Results          CT Head Without Contrast (Final result)  Result time 07/29/19 12:27:16    Final result by Tanner Beltran MD (07/29/19 12:27:16)                 Impression:      No acute intracranial abnormality.    Electronically signed by resident: Vidal Wilson  Date:    07/29/2019  Time:    12:15    Electronically signed by: Tanner Beltran MD  Date:    07/29/2019  Time:    12:27             Narrative:    EXAMINATION:  CT HEAD WITHOUT CONTRAST    CLINICAL HISTORY:  Stroke;    TECHNIQUE:  Low dose axial images were obtained through the head.  Coronal and sagittal reformations were also performed. Contrast was not administered.    COMPARISON:  CT head 07/26/2019    FINDINGS:  Hypodense area in the nabil similar to prior CT likely represents artifact in the posterior fossa.    The brain parenchyma is of normal attenuation with no evidence of intracranial hemorrhage, major vascular distribution infarct or mass effect.  There are no extra-axial masses or fluid collections.  The ventricular system is of normal size for age and midline.    Visualized paranasal sinuses and mastoid air cells are clear.  There is no evidence of skull fracture.                                 Medical Decision Making:   History:   Old Medical Records: I decided to obtain old medical records.  Old Records Summarized: records from clinic visits.       <> Summary of Records: Records summarized in HPI  Initial Assessment:   39-year-old man coming in with numbness and bilateral arm numbness, in the context of recent admission and extensive workup for stroke/TIA.  Stroke activation undertaken.  Stroke staff does not think this is consistent with acute stroke at this time.  Patient's symptoms resolved while in the emergency department.  Workup undertaken with labs are unremarkable.  Patient is regular in sinus on  monitor.  In bilateral symptoms with a history of neck pain MRI of the neck obtained.  It is not concerning for dangerous impingement or spinal pathology at this time.  Spoke with neuro team again given symptoms of resolved and patient is stable, they are in agreement with discharge with close outpatient neurological follow-up and further workup.  Patient is already on medical therapy for possible TIAs.  Patient is asymptomatic at time of discharge.  Given neurologic symptoms of unclear etiology B12, RPR, Lyme disease titer signs. These are to be followed up as an outpatient.    Patient and family member agree with plan.    Return instructions for any new, recurring or worrisome symptoms. Patient fell a number verbalized understanding of return precautions.    Critical Care Time:     The high probability of sudden, clinically significant deterioration in the patient's condition required the highest level of my preparedness to intervene urgently.    Services included the following: chart data review, reviewing nursing notes and/or old charts, documentation time, consultant collaboration regarding findings and treatment options, medication orders and management, direct patient care, vital sign assessments and ordering, interpreting and reviewing diagnostic studies/lab tests.     I spent 40 minutes on total Critical Care time, which includes only time during which I was engaged in work directly related to the patient's care, as described above, whether at the bedside or elsewhere in the Emergency Department.  It did not include time spent on separately billable procedures nor did it include the time spent by residents, students, nurses or physician assistants on this patient's care.    Critical Care was needed secondary to the following conditions: possible stroke, stroke activation.     Independently Interpreted Test(s):   I have ordered and independently interpreted EKG Reading(s) - see prior notes  Clinical Tests:    Lab Tests: Ordered and Reviewed  Radiological Study: Ordered and Reviewed  Medical Tests: Ordered and Reviewed            Scribe Attestation:   Scribe #1: I performed the above scribed service and the documentation accurately describes the services I performed. I attest to the accuracy of the note.               Clinical Impression:   No diagnosis found.                             Heri Merrill MD  07/30/19 [id number]

## 2019-07-29 NOTE — HPI
40 yo M with PMHx of recent colitis treated with course of ciprofloxacin and metronidazole and recent TIA seen 07/26/19 due to R facial droop with RUE/RLE weakness and numbness as well as aphasia.  Presents to C 07/29/19 due to BUE numbness/tingling that seems to have moved to LUE and whole tongue numbness.

## 2019-07-29 NOTE — SUBJECTIVE & OBJECTIVE
No past medical history on file.  Past Surgical History:   Procedure Laterality Date    FINGER FRACTURE SURGERY Right     index finger, flag football injury     Family History   Problem Relation Age of Onset    No Known Problems Mother     No Known Problems Father     Cancer Maternal Grandmother         unknown details    Melanoma Maternal Grandfather     Diabetes Paternal Grandmother     No Known Problems Paternal Grandfather      Social History     Tobacco Use    Smoking status: Never Smoker    Smokeless tobacco: Never Used   Substance Use Topics    Alcohol use: Yes    Drug use: Never     Review of patient's allergies indicates:  No Known Allergies    Medications: I have reviewed the current medication administration record.    No current facility-administered medications for this encounter.     Current Outpatient Medications:     aspirin (ECOTRIN) 81 MG EC tablet, Take 1 tablet (81 mg total) by mouth once daily., Disp: , Rfl: 0    atorvastatin (LIPITOR) 20 MG tablet, Take 1 tablet (20 mg total) by mouth once daily., Disp: 90 tablet, Rfl: 3    ondansetron (ZOFRAN) 4 MG tablet, Take 1 tablet (4 mg total) by mouth every 8 (eight) hours as needed for Nausea., Disp: 30 tablet, Rfl: 1    Review of Systems   Constitutional: Negative for chills and fever.   HENT: Negative for sneezing and voice change.    Eyes: Negative for photophobia and visual disturbance.   Respiratory: Negative for cough and shortness of breath.    Cardiovascular: Negative for palpitations and leg swelling.   Gastrointestinal: Negative for nausea and vomiting.   Skin: Negative for rash and wound.   Neurological: Positive for numbness. Negative for weakness.   Hematological: Negative for adenopathy. Does not bruise/bleed easily.   Psychiatric/Behavioral: Negative for agitation and confusion.     Objective:     Vital Signs (Most Recent):  Temp: 98.2 °F (36.8 °C) (07/29/19 1200)  Pulse: 74 (07/29/19 1750)  Resp: 16 (07/29/19  1750)  BP: 104/64 (07/29/19 1750)  SpO2: 99 % (07/29/19 1750)    Vital Signs Range (Last 24H):  Temp:  [98.2 °F (36.8 °C)]   Pulse:  [74-95]   Resp:  [16-19]   BP: (104-125)/(61-69)   SpO2:  [98 %-100 %]     Physical Exam  General:  Well-developed, well-nourished, nad  HEENT:  NCAT, PERRLA, EOMI, oropharyngeal membranes non-erythematous/without exudate  Neck:  Supple, normal ROM without nuchal rigidity  Resp:  Symmetric expansion, no increased wob  CVS:  No LE edema, peripheral pulses 2+ (radial, dorsalis pedis)  GI:  Abd soft, non-distended, non-tender to palpation  Neurologic Exam:  Mental Status:  AAOx3.  Speech, thought content appropriate.   Cranial Nerves:  VFs intact on counting fingers in all quadrants bilaterally.  PERRLA, EOMI.  Facial movement, sensation intact and symmetric.  Palate raises symmetrically, tongue protrudes midline.  Trapezius 5/5 bilaterally.  Motor:  Normal bulk and tone.  BUE shoulder abduction, biceps/triceps, wrist flexion/extension,  strength 5/5.  BLE hip flexors, knee flexion/extension, plantarflexion/dorsiflexion 5/5.  No pronator drift.  Sensory:  Intact to light touch at all extremities and face without inattention.  Vibratory sensation intact at BUE/BLE digits.  Reflexes:  Biceps, brachioradialis, patellar brisk 2+ and symmetric.  No ankle clonus.  No Montgomery's.    Coordination:  FNF, SEBAS, HTS intact with no dysmetria/ataxia/dysdiadochokinesia.  No resting tremor or myoclonus.  Gait:  Normal balance with appropriate arm swing, normal posture, no shuffling, no magnetic gait.    Neurological Exam:   LOC: alert  Attention Span: Good   Language: No aphasia  Articulation: No dysarthria  Orientation: Person, Place, Time   Visual Fields: Full  EOM (CN III, IV, VI): Full/intact  Pupils (CN II, III): PERRL  Facial Sensation (CN V): Normal  Facial Movement (CN VII): Symmetric facial expression    Reflexes: 2+ throughout  Motor: Arm left  Normal 5/5  Leg left  Normal 5/5  Arm right   Normal 5/5  Leg right Normal 5/5  Cebellar: No evidence of appendicular or axial ataxia  Sensation: Intact to light touch, temperature and vibration  Subjective numbness/tingling in LUE, resolving during exam  Tone: Normal tone throughout    Laboratory:  CMP:   Recent Labs   Lab 19  1233   CALCIUM 9.1   ALBUMIN 4.2   PROT 7.0   *   K 4.0   CO2 28      BUN 10   CREATININE 0.9   ALKPHOS 55   ALT 73*   AST 33   BILITOT 0.5     CBC:   Recent Labs   Lab 19  1233   WBC 6.48   RBC 4.79   HGB 13.1*   HCT 41.0      MCV 86   MCH 27.3   MCHC 32.0     Lipid Panel:   Recent Labs   Lab 19  1308   CHOL 120   LDLCALC 75.4   HDL 26*   TRIG 93     Coagulation:   Recent Labs   Lab 19  1308 19  0441   INR 1.3*  --    APTT  --  23.4     Hgb A1C:   Recent Labs   Lab 19  1308   HGBA1C 5.4     TSH:   Recent Labs   Lab 19  1308   TSH 1.324     Diagnostic Results:    Brain imagin19 CT head w/o contrast:  FINDINGS:  Hypodense area in the nabil similar to prior CT likely represents artifact in the posterior fossa.  The brain parenchyma is of normal attenuation with no evidence of intracranial hemorrhage, major vascular distribution infarct or mass effect.  There are no extra-axial masses or fluid collections.  The ventricular system is of normal size for age and midline.    Vessel Imaging:  Previous:  MRI Brain Ischemic Intervention Protocol--  1. No findings to suggest acute infarct or hemorrhage.  2. No hemodynamically significant stenosis, occlusion, av malformation or aneurysm of the anterior or posterior circulation, noting variant anatomy as described above.  3. Left occipital DVA.    Cardiac Evaluation:   19 TTE w/ CFD:  · Normal left ventricular systolic function. The estimated ejection fraction is 60%  · No wall motion abnormalities.  · Normal LV diastolic function.  · Normal right ventricular systolic function.  · Normal central venous pressure (3 mm Hg).

## 2019-07-30 ENCOUNTER — PES CALL (OUTPATIENT)
Dept: ADMINISTRATIVE | Facility: CLINIC | Age: 39
End: 2019-07-30

## 2019-07-30 LAB — RPR SER QL: NORMAL

## 2019-07-30 NOTE — ED NOTES
Patient verbalized understanding of discharge instructions including follow up and medications. No s/s of distress noted. Denies any numbness and tingling. Steady gait noted. All belongings left with patient.

## 2019-07-31 ENCOUNTER — HOSPITAL ENCOUNTER (EMERGENCY)
Facility: HOSPITAL | Age: 39
Discharge: HOME | End: 2019-07-31
Attending: EMERGENCY MEDICINE
Payer: COMMERCIAL

## 2019-07-31 ENCOUNTER — PATIENT MESSAGE (OUTPATIENT)
Dept: INTERNAL MEDICINE | Facility: CLINIC | Age: 39
End: 2019-07-31

## 2019-07-31 ENCOUNTER — TELEPHONE (OUTPATIENT)
Dept: INTERNAL MEDICINE | Facility: CLINIC | Age: 39
End: 2019-07-31

## 2019-07-31 VITALS
TEMPERATURE: 97 F | WEIGHT: 171 LBS | BODY MASS INDEX: 24.48 KG/M2 | RESPIRATION RATE: 19 BRPM | HEIGHT: 70 IN | HEART RATE: 77 BPM | OXYGEN SATURATION: 100 % | SYSTOLIC BLOOD PRESSURE: 118 MMHG | DIASTOLIC BLOOD PRESSURE: 63 MMHG

## 2019-07-31 DIAGNOSIS — R20.2 NUMBNESS AND TINGLING: Primary | ICD-10-CM

## 2019-07-31 DIAGNOSIS — R20.0 NUMBNESS AND TINGLING: Primary | ICD-10-CM

## 2019-07-31 LAB
CERULOPLASMIN SERPL-MCNC: 29 MG/DL (ref 15–45)
CRP SERPL-MCNC: 0.6 MG/L (ref 0–8.2)
ERYTHROCYTE [SEDIMENTATION RATE] IN BLOOD BY WESTERGREN METHOD: <2 MM/HR (ref 0–23)

## 2019-07-31 PROCEDURE — 99283 EMERGENCY DEPT VISIT LOW MDM: CPT

## 2019-07-31 PROCEDURE — 85652 RBC SED RATE AUTOMATED: CPT

## 2019-07-31 PROCEDURE — 84446 ASSAY OF VITAMIN E: CPT

## 2019-07-31 PROCEDURE — 86038 ANTINUCLEAR ANTIBODIES: CPT

## 2019-07-31 PROCEDURE — 84630 ASSAY OF ZINC: CPT

## 2019-07-31 PROCEDURE — 84425 ASSAY OF VITAMIN B-1: CPT

## 2019-07-31 PROCEDURE — 87798 DETECT AGENT NOS DNA AMP: CPT

## 2019-07-31 PROCEDURE — 87498 ENTEROVIRUS PROBE&REVRS TRNS: CPT

## 2019-07-31 PROCEDURE — 86140 C-REACTIVE PROTEIN: CPT

## 2019-07-31 PROCEDURE — 99284 EMERGENCY DEPT VISIT MOD MDM: CPT | Mod: ,,, | Performed by: EMERGENCY MEDICINE

## 2019-07-31 PROCEDURE — 82390 ASSAY OF CERULOPLASMIN: CPT

## 2019-07-31 PROCEDURE — 83921 ORGANIC ACID SINGLE QUANT: CPT

## 2019-07-31 PROCEDURE — 99284 PR EMERGENCY DEPT VISIT,LEVEL IV: ICD-10-PCS | Mod: ,,, | Performed by: EMERGENCY MEDICINE

## 2019-07-31 PROCEDURE — 84252 ASSAY OF VITAMIN B-2: CPT

## 2019-07-31 PROCEDURE — 83516 IMMUNOASSAY NONANTIBODY: CPT

## 2019-07-31 PROCEDURE — 84207 ASSAY OF VITAMIN B-6: CPT

## 2019-07-31 PROCEDURE — 82525 ASSAY OF COPPER: CPT

## 2019-07-31 RX ORDER — ALPRAZOLAM 0.25 MG/1
0.25 TABLET ORAL 2 TIMES DAILY PRN
Qty: 8 TABLET | Refills: 0 | Status: SHIPPED | OUTPATIENT
Start: 2019-07-31 | End: 2020-09-17 | Stop reason: SDUPTHER

## 2019-07-31 RX ORDER — ALPRAZOLAM 0.5 MG/1
0.5 TABLET ORAL
Status: DISCONTINUED | OUTPATIENT
Start: 2019-07-31 | End: 2019-07-31

## 2019-07-31 NOTE — DISCHARGE INSTRUCTIONS
I discussed with neurology, they have evaluated you in the ER, and they have recommended adding on some other lab tests which are pending.  They have scheduled you a neurology appointment for tomorrow morning at 8:30am.  I have prescribed a small amount of Xanax for anxiety. Do not drive while taking this or combine with alcohol or other sedating medications.    Keep the appointment with neurology tomorrow as planned.    Seek immediate medical attention if you have new or worsening symptoms, or for any other concern.

## 2019-07-31 NOTE — ED TRIAGE NOTES
"PT REPORTS HAVING NUMBNESS IN HIS HANDS THAT RADIATES ABOVE THE ELBOW, NUMBNESS OF HIS TONGUE AND NOSE AND TINGLING OF HIS LEGS THAT STOPS BELOW THE CALF. PT STATES SYMPTOMS ARE "CLEARING UP" RIGHT NOW AT THIS TIME. DENIES SLURRED SPEECH, HEAD ACHE, AND VISUAL CHANGES. PT MOVES ALL EXTREMITIES WELL AND SPEECH IS CLEAR AND FLUENT. PT WAS SEEN HERE ON 7/18 AND STATES ALL EXAMS WERE "NORMAL".   "

## 2019-07-31 NOTE — CONSULTS
Please see progress note dated 07/31/19.  Patient to be seen in Neurology clinic 08/01/19 08:30 am with Dr. Day, staffed by Dr. Haddad.

## 2019-07-31 NOTE — PROGRESS NOTES
Patient seen in ED during a Stroke Code on Monday 07/29/19.  Please see note by me at that time.  Today he presents with similar symptoms, numbness and tingling intermittently throughout both arms.  Not as much involvement of the tongue today.  Mother is at bedside, asking a lot of pointed questions about work up, poor understanding.  Later talked to mother outside of the room who is insistent that patient is just stressed and wants to have him prescribed something for anxiety given recent absence from work, new baby, recent TIA.    Discussed ddx and different diagnostic modalities available to evaluate.  Repeated Neuro exam which is unchanged from prior note 07/29/19.  Also reviewed MRI C spine and brain from past week.    Ddx:  1)  Post-viral neuritis, post-viral myositis  2)  Presyncope with associated paresthesias  3)  Vitamin deficiency, toxic exposure with peripheral neuropathy symptoms  4)  Transient compressive symptoms related to mild neuroforaminal narrowing in neck  5)  TIAs--low suspicion, symptoms not consistent with one area of deficit in the brain  6)  Seizure--low suspicion 2/2 bilateral symptoms with no effect on consciousness  7)  Complicated migraine--unlikely as pt reports no hx of migraine, only R occipital headaches.  No family hx of migraine.  8)  Autoimmune disease--post-viral presentation of myalgias, paresthesias  9)  Medication side effect--recently started on daily ASA and atorvastatin  10)  Conversion disorder--high levels of stress, recent anxiety-provoking events    PLAN:  -Appointment moved up to see [facility] [place] [facility]rologist 08/01/19 08:30--Dr. Day with Dr. Haddad staffing  -Lab work up in process:   -Vitamins:  Vitamin B1, B2, B6, B12, E; MMA   -Toxic/Medication-related:  Copper, ceruloplasmin, zinc, HMG CoA Reductase Ab   -Autoimmune:  BRITTNEY profile   -Infectious:  RPR, B burgdorferi, WNV, Enterovirus    -Post-viral:  ESR, CRP.  AST and ALT trending down.  -No role for EEG at this time, could  consider in the future if symptoms are more consistent with focal seizure  -Also suspect EMG would be of little value as symptoms are intermittent and not necessarily progressive  -Conversion disorder--mother had suggested trial of alprazolam in the ED.  Would not be unreasonable to consider but discussed indications to come back to ED if symptoms are more consistent with stroke or seizure pattern.  Can give few tablets alprazolam on discharge from ED but will have Neurology appt then discuss getting established with Psychiatry.   -Mother insisting on us telling him alprazolam is not for anxiety but for symptoms or potential seizure.   -No indication for LP based on exam and history at this time.  -Ok for discharge from ED from neurology standpoint  -May be a role for outpatient work up for syncope--tilt table test.    Raquel Peralta MD  PGY4, Neurology  Pager:  [phone]

## 2019-07-31 NOTE — ED NOTES
Pt lying semi fowlers watching tv. AAOx4.In no acute distress, breathing is equal and nonlabored. Undated on current plan of care, side rails up x1, and call light is within reach. Family at bedside. Will continue to monitor.

## 2019-07-31 NOTE — ED PROVIDER NOTES
Encounter Date: 7/31/2019    SCRIBE #1 NOTE: I, Kaity Grace , am scribing for, and in the presence of,  Dr. Mcfarlane. I have scribed the entire note.       History     Chief Complaint   Patient presents with    Numbness     Pt reports intemittent numbness to extremities and face since Friday after having TIA.  Pt was seen on Monday for same c/o.      Mr. Dae Amador is a 39 y.o. male with no pertinent past medical history presents with a complaint of intermittent numbness to his arms bilaterally, the tip of is tongue, and the tip of his nose that began about 3 hours ago but have been intermittent all night. Pt states that this is his fifth visit to the ED this month. Pt reports he was seen in the ER about a month ago for fever and body aches thought to be a viral syndrome.  His regular doctor then saw him and did a CT ab/pelvis and diagnosed him with colitis. He was started on cipro flagyl and his fever subsided. Several days after that he was seen in this ER for sudden onset of a TIA.  Pt endorses neck stiffness but this is chronic for him. Pt denies any headache, fever, and chills.  He reports his symptoms are currently improved and that they are intermittent.    The history is provided by the patient and medical records.     Review of patient's allergies indicates:  No Known Allergies  History reviewed. No pertinent past medical history.  Past Surgical History:   Procedure Laterality Date    FINGER FRACTURE SURGERY Right     index finger, flag football injury     Family History   Problem Relation Age of Onset    No Known Problems Mother     No Known Problems Father     Cancer Maternal Grandmother         unknown details    Melanoma Maternal Grandfather     Diabetes Paternal Grandmother     No Known Problems Paternal Grandfather      Social History     Tobacco Use    Smoking status: Never Smoker    Smokeless tobacco: Never Used   Substance Use Topics    Alcohol use: Yes    Drug use: Never     Review  of Systems   Constitutional: Negative for chills and fever.   HENT: Negative for congestion.    Respiratory: Negative for chest tightness.    Cardiovascular: Negative for chest pain.   Gastrointestinal: Negative for abdominal pain.   Endocrine: Negative for cold intolerance.   Genitourinary: Negative.    Musculoskeletal: Positive for myalgias. Negative for neck stiffness.   Skin: Negative.    Neurological: Positive for numbness. Negative for headaches.        Tingling in the arms bilaterally, tip of tongue, and tip of nose.    Hematological: Does not bruise/bleed easily.   Psychiatric/Behavioral: Negative for behavioral problems.   All other systems reviewed and are negative.      Physical Exam     Initial Vitals [07/31/19 1002]   BP Pulse Resp Temp SpO2   116/75 86 16 97.4 °F (36.3 °C) 98 %      MAP       --         Physical Exam    Nursing note and vitals reviewed.  Constitutional: He appears well-developed and well-nourished.   HENT:   Head: Normocephalic and atraumatic.   Eyes: EOM are normal. Pupils are equal, round, and reactive to light.   Neck: Normal range of motion.   Cardiovascular: Normal rate, regular rhythm and intact distal pulses.   Pulmonary/Chest: Breath sounds normal. No respiratory distress.   Abdominal: Soft. There is no tenderness.   Musculoskeletal: Normal range of motion.   Neurological: He is alert and oriented to person, place, and time. He has normal strength. No cranial nerve deficit or sensory deficit. GCS score is 15. GCS eye subscore is 4. GCS verbal subscore is 5. GCS motor subscore is 6.   Skin: Skin is warm and dry.   Psychiatric: He has a normal mood and affect.         ED Course   Procedures  Labs Reviewed   ENTEROVIRUS DETECTION BY PCR, BLOOD - Abnormal; Notable for the following components:       Result Value    Enterovirus Detection, PCR, Blood Positive (*)     All other components within normal limits    Narrative:     ADD ON ENTEROVIRUS BLOOD PCR AND WNV PCR PER DR JUNI LYLE     07/31/2019  15:59    BRITTNEY PROFILE I (SCREEN)    Narrative:     ADD ON HMGCR [id number] PER JUNI PERALTA MD  14:30  08/01/2019    CERULOPLASMIN   VITAMIN E   SEDIMENTATION RATE   METHYLMALONIC ACID, SERUM   C-REACTIVE PROTEIN   VITAMIN B1   ZINC   COPPER, SERUM   WNV PCR, BLOOD (QUALITATIVE)   VITAMIN B6   VITAMIN B2   ENTEROVIRUS DETECTION BY PCR, BLOOD   HMGCR (HMG COENZYME A REDUCTASE) AB   VITAMIN B2   VITAMIN B6   WNV PCR, BLOOD (QUALITATIVE)    Narrative:     ADD ON ENTEROVIRUS BLOOD PCR AND WNV PCR PER DR JUNI PERALTA    07/31/2019  15:59    DPYD SPECIMEN          Imaging Results    None          Medical Decision Making:   History:   Old Medical Records: I decided to obtain old medical records.  Initial Assessment:   Recurrent and intermittent numbness since TIA  Has been seen by vascular neurology and has lyme titers pending  Upon arrival symptoms improved but he has had them all night  Differential Diagnosis:   TIA, lyme disease, encephalitis, electrolyte or vitamin derangement  ED Management:  Consulted neurology, they have scheduled him a follow up appt for tomorrow morning  Do not think pt has had a recurrence of TIA  Added on multiple labs recommended by neurology  Lyme still pending  Other:   I have discussed this case with another health care provider.       <> Summary of the Discussion: Neurology            Scribe Attestation:   Scribe #1: I performed the above scribed service and the documentation accurately describes the services I performed. I attest to the accuracy of the note.            ED Course as of Aug 05 1024   Wed Jul 31, 2019   1139 Discussed again with neurology and would like a consult on this patient and recommendations for additional work up    [GS]   1407 Discussed w neurology, Dr. Peralta saw the patient in th Er. They don't recommend LP at this time. She plans on adding in some serum studies for things like [place], will get pt into clinic in the morning to be seen    [GS]      ED  Course User Index  [GS] Marielena Urias MD     Clinical Impression:       ICD-10-CM ICD-9-CM   1. Numbness and tingling R20.0 782.0    R20.2                                 Marielena Mcfarlane MD  08/05/19 0137       Marielena Mcfarlane MD  08/05/19 1024

## 2019-07-31 NOTE — ED NOTES
Pt awake and alert; resting quietly on stretcher.  Pt remains on continuous cardiac and pulse ox monitoring with non-invasive blood pressure to cycle every 30 minutes.  VS stable; NSR noted. Pt denies pain at this time; no acute distress or discomfort reported or observed.  Pt denies restroom needs at this time; is able to reposition self on stretcher. Bed locked in lowest position; side rails up and locked x 2; call light, bedside table, and personal belongings within reach. Plan of care discussed.  Pt instructed to alert nurse for assistance and before attempting to get out of bed; verbalizes understanding. Pt denies needs or complaints at this time; will continue to monitor.

## 2019-08-01 ENCOUNTER — HOSPITAL ENCOUNTER (OUTPATIENT)
Dept: RADIOLOGY | Facility: HOSPITAL | Age: 39
Discharge: HOME | End: 2019-08-01
Attending: INTERNAL MEDICINE
Payer: COMMERCIAL

## 2019-08-01 ENCOUNTER — OFFICE VISIT (OUTPATIENT)
Dept: INTERNAL MEDICINE | Facility: CLINIC | Age: 39
End: 2019-08-01
Payer: COMMERCIAL

## 2019-08-01 ENCOUNTER — PATIENT MESSAGE (OUTPATIENT)
Dept: INTERNAL MEDICINE | Facility: CLINIC | Age: 39
End: 2019-08-01

## 2019-08-01 ENCOUNTER — OFFICE VISIT (OUTPATIENT)
Dept: NEUROLOGY | Facility: CLINIC | Age: 39
End: 2019-08-01
Payer: COMMERCIAL

## 2019-08-01 VITALS
DIASTOLIC BLOOD PRESSURE: 70 MMHG | WEIGHT: 170.44 LBS | SYSTOLIC BLOOD PRESSURE: 110 MMHG | OXYGEN SATURATION: 99 % | HEART RATE: 82 BPM | HEIGHT: 70 IN | BODY MASS INDEX: 24.4 KG/M2

## 2019-08-01 DIAGNOSIS — R29.90 EPISODE OF TRANSIENT NEUROLOGIC SYMPTOMS: ICD-10-CM

## 2019-08-01 DIAGNOSIS — Z87.39: ICD-10-CM

## 2019-08-01 DIAGNOSIS — R29.90 EPISODE OF TRANSIENT NEUROLOGIC SYMPTOMS: Primary | ICD-10-CM

## 2019-08-01 DIAGNOSIS — A69.20 LYME DISEASE: ICD-10-CM

## 2019-08-01 DIAGNOSIS — R76.8 POSITIVE LYME DISEASE SEROLOGY: Primary | ICD-10-CM

## 2019-08-01 PROBLEM — R47.01 APHASIA: Status: RESOLVED | Noted: 2019-07-26 | Resolved: 2019-08-01

## 2019-08-01 PROBLEM — K52.9 ACUTE GASTROENTERITIS: Status: RESOLVED | Noted: 2019-07-22 | Resolved: 2019-08-01

## 2019-08-01 LAB — ANA SER QL IF: NORMAL

## 2019-08-01 PROCEDURE — 99999 PR PBB SHADOW E&M-EST. PATIENT-LVL II: ICD-10-PCS | Mod: PBBFAC,,, | Performed by: STUDENT IN AN ORGANIZED HEALTH CARE EDUCATION/TRAINING PROGRAM

## 2019-08-01 PROCEDURE — 93880 US CAROTID BILATERAL: ICD-10-PCS | Mod: 26,,, | Performed by: RADIOLOGY

## 2019-08-01 PROCEDURE — 99999 PR PBB SHADOW E&M-EST. PATIENT-LVL IV: CPT | Mod: PBBFAC,,, | Performed by: INTERNAL MEDICINE

## 2019-08-01 PROCEDURE — 99214 PR OFFICE/OUTPT VISIT, EST, LEVL IV, 30-39 MIN: ICD-10-PCS | Mod: S$GLB,,, | Performed by: PSYCHIATRY & NEUROLOGY

## 2019-08-01 PROCEDURE — 99214 OFFICE O/P EST MOD 30 MIN: CPT | Mod: S$GLB,,, | Performed by: INTERNAL MEDICINE

## 2019-08-01 PROCEDURE — 99214 PR OFFICE/OUTPT VISIT, EST, LEVL IV, 30-39 MIN: ICD-10-PCS | Mod: S$GLB,,, | Performed by: INTERNAL MEDICINE

## 2019-08-01 PROCEDURE — 93880 EXTRACRANIAL BILAT STUDY: CPT | Mod: TC

## 2019-08-01 PROCEDURE — 93880 EXTRACRANIAL BILAT STUDY: CPT | Mod: 26,,, | Performed by: RADIOLOGY

## 2019-08-01 PROCEDURE — 99999 PR PBB SHADOW E&M-EST. PATIENT-LVL II: CPT | Mod: PBBFAC,,, | Performed by: STUDENT IN AN ORGANIZED HEALTH CARE EDUCATION/TRAINING PROGRAM

## 2019-08-01 PROCEDURE — 99214 OFFICE O/P EST MOD 30 MIN: CPT | Mod: S$GLB,,, | Performed by: PSYCHIATRY & NEUROLOGY

## 2019-08-01 PROCEDURE — 99999 PR PBB SHADOW E&M-EST. PATIENT-LVL IV: ICD-10-PCS | Mod: PBBFAC,,, | Performed by: INTERNAL MEDICINE

## 2019-08-01 RX ORDER — BACLOFEN 10 MG/1
5 TABLET ORAL NIGHTLY
Qty: 5 TABLET | Refills: 11 | Status: SHIPPED | OUTPATIENT
Start: 2019-08-01 | End: 2020-09-17

## 2019-08-01 NOTE — Clinical Note
Hi Dr. Ivey,I was wondering if you had a moment to review my notes on this patient and see if he can get into ID clinic.  Some unusual Neuro sx in a otherwise healthy patient (not psych related as far as I can tell) and Lyme Ab + from the ED.  I ordered some additional testing and Neuro w/u including MRI brain and MRA has been unremarkable thus far.  WNV and BRITTNEY are pending.  Hoping he can get seen as soon as possible as he has had several ED visits this month without any clear answers.I tried calling the clinic and was told you had to approve the scheduling for Lyme disease patients.Thanks in advance for your input.Miladis Sequeira, MDSection of Internal Medicine/Primary Care

## 2019-08-01 NOTE — PROGRESS NOTES
INTERNAL MEDICINE ESTABLISHED PATIENT VISIT NOTE    Subjective:     Chief Complaint: Hospital Follow Up       Patient ID: Dae Amador is a 39 y.o. male with fatigue, nonspecific neuro sx, transaminitis, AoCD, last seen by me 7/22, here today for f/u.    Please see my last two notes for details leading up to current sx.  To review, had myalgias, weakness and fatigue c neck pain started in early July.  Several weeks later developed n/v, diarrhea, and fever.  CT abd 7/15/19 showed colitis so was tx'ed c cipro and flagyl c resolution of GI sx and fever.  Incidentally had small focus in liver too small to characterize and spleen mildly enlarged as well as two 2-mm pulm nodules in RML not req f/u in a low-risk pt.  Labs only remarkable for mild AoCD and transaminitis and marginally low Vit D.    Since last visit, has had several neuro complaints, most strikingly c slurred speech, facial droop, and R sided weakness.  Had MRI and MRA done which was unremarkable other than incidental L occipital DVA.  Was discharged home c dx of TIA c asa and statin (of note, pt does not have HTN and chol has overall been normal).    Had intermittent sx of numbness on his face and tongue so went back to the ED several times, most recently yesterday and was seen by Neuro ago who moved up his appt to this morning and ordered additional labs.  In the interim, labs for Lyme disease from last week resulted as +Lyme Ab at 4.03.  States the ER gave him a rx for Doxy which he did not start yet as he was awaiting f/u c Neuro and c me.    States Neuro wanted to do repeat MRI and additional testing but pt states he did not think repeat MRI would be helpful (Neuro note not yet available to me as he was seen by them an hour ago).    Additional labs from ED yesterday including vit B6, B2, WNV PCR, entervirus PCR, BRITTNEY, ceruloplasmin, MMR, all pending.    Past Medical History:  History reviewed. No pertinent past medical history.    Home  "Medications:  Prior to Admission medications    Medication Sig Start Date End Date Taking? Authorizing Provider   ALPRAZolam (XANAX) 0.25 MG tablet Take 1 tablet (0.25 mg total) by mouth 2 (two) times daily as needed for Anxiety. 7/31/19 8/30/19  Marielena Urias MD   aspirin (ECOTRIN) 81 MG EC tablet Take 1 tablet (81 mg total) by mouth once daily. 7/28/19 7/27/20  Sebastián Deluna NP   atorvastatin (LIPITOR) 20 MG tablet Take 1 tablet (20 mg total) by mouth once daily. 7/28/19 7/27/20  Sebastián Deluna NP   baclofen (LIORESAL) 10 MG tablet Take 0.5 tablets (5 mg total) by mouth every evening. 8/1/19 7/31/20  Karolyn Day MD   ondansetron (ZOFRAN) 4 MG tablet Take 1 tablet (4 mg total) by mouth every 8 (eight) hours as needed for Nausea. 7/15/19   Miladis Sequeira MD       Allergies:  Review of patient's allergies indicates:  No Known Allergies    Social History:  Social History     Tobacco Use    Smoking status: Never Smoker    Smokeless tobacco: Never Used   Substance Use Topics    Alcohol use: Yes    Drug use: Never        Review of Systems   Constitutional: Negative for chills, fatigue and fever.   Respiratory: Negative for cough, chest tightness and shortness of breath.    Cardiovascular: Negative for chest pain.   Gastrointestinal: Negative for abdominal pain and blood in stool.   Genitourinary: Negative for dysuria and frequency.   Neurological: Positive for weakness and numbness.         Health Maintenance:     Immunizations:   Influenza rec this Fall  TDap 2017 at [facility] 13 rec at 66  Shingrix rec once back in stock     Cancer Screening:  Colonoscopy: rec at 50 unless anemia persists and depending on w/u    Objective:   /70 (BP Location: Left arm, Patient Position: Sitting, BP Method: Medium (Manual))   Pulse 82   Ht 5' 10" (1.778 m)   Wt 77.3 kg (170 lb 6.7 oz)   SpO2 99%   BMI 24.45 kg/m²        General: AAO x3, no apparent distress  HEENT: PERRL, OP clear  CV: RRR, no " m/r/g  Pulm: Lungs CTAB, no crackles, no wheezes  Abd: s/NT/ND +BS  Extremities: no c/c/e    Labs:     Lab Results   Component Value Date    WBC 6.48 07/29/2019    HGB 13.1 (L) 07/29/2019    HCT 41.0 07/29/2019    MCV 86 07/29/2019     07/29/2019     Sodium   Date Value Ref Range Status   07/29/2019 135 (L) 136 - 145 mmol/L Final     Potassium   Date Value Ref Range Status   07/29/2019 4.0 3.5 - 5.1 mmol/L Final     Chloride   Date Value Ref Range Status   07/29/2019 100 95 - 110 mmol/L Final     CO2   Date Value Ref Range Status   07/29/2019 28 23 - 29 mmol/L Final     Glucose   Date Value Ref Range Status   07/29/2019 107 70 - 110 mg/dL Final     BUN, Bld   Date Value Ref Range Status   07/29/2019 10 6 - 20 mg/dL Final     Creatinine   Date Value Ref Range Status   07/29/2019 0.9 0.5 - 1.4 mg/dL Final     Calcium   Date Value Ref Range Status   07/29/2019 9.1 8.7 - 10.5 mg/dL Final     Total Protein   Date Value Ref Range Status   07/29/2019 7.0 6.0 - 8.4 g/dL Final     Albumin   Date Value Ref Range Status   07/29/2019 4.2 3.5 - 5.2 g/dL Final     Total Bilirubin   Date Value Ref Range Status   07/29/2019 0.5 0.1 - 1.0 mg/dL Final     Comment:     For infants and newborns, interpretation of results should be based  on gestational age, weight and in agreement with clinical  observations.  Premature Infant recommended reference ranges:  Up to 24 hours.............<8.0 mg/dL  Up to 48 hours............<12.0 mg/dL  3-5 days..................<15.0 mg/dL  6-29 days.................<15.0 mg/dL       Alkaline Phosphatase   Date Value Ref Range Status   07/29/2019 55 55 - 135 U/L Final     AST   Date Value Ref Range Status   07/29/2019 33 10 - 40 U/L Final     ALT   Date Value Ref Range Status   07/29/2019 73 (H) 10 - 44 U/L Final     Anion Gap   Date Value Ref Range Status   07/29/2019 7 (L) 8 - 16 mmol/L Final     eGFR if    Date Value Ref Range Status   07/29/2019 >60.0 >60 mL/min/1.73 m^2 Final      eGFR if non    Date Value Ref Range Status   07/29/2019 >60.0 >60 mL/min/1.73 m^2 Final     Comment:     Calculation used to obtain the estimated glomerular filtration  rate (eGFR) is the CKD-EPI equation.        Lab Results   Component Value Date    HGBA1C 5.4 07/26/2019     Lab Results   Component Value Date    LDLCALC 75.4 07/26/2019     Lab Results   Component Value Date    TSH 1.324 07/26/2019         Assessment/Plan     Dae was seen today for hospital follow up.    Diagnoses and all orders for this visit:    Episode of transient neurologic symptoms  On review of chart, Lyme disease is a possibility but rec confirmatory testing and evaluation by ID, labs ordered today.  Did not have typical EM.  Echo wnl.  GI sx also not typical of Lyme but unsure if related.  u/s carotids added.  Suspect preceding infection triggering Neuro sx vs autoimmune  -     US Carotid Bilateral; Future  -     Ambulatory Referral to Infectious Disease    Lyme disease  Possible, will check additional testing c EIA and western blot.  Referred to ID and spoke to clinic today who stated appt would have to be approved by Dr. Ivey, will message on [employer] as well.  -     Ambulatory Referral to Infectious Disease  -     LYME DISEASE ANTIBODY BY EIA; Future    HM as above  RTC in 2 weeks for f/u, sooner if needed.    Miladis Sequeira MD  Department of Internal Medicine - [facility]  08/01/2019

## 2019-08-01 NOTE — Clinical Note
Please schedule two lyme disease labs for today at 1pm in the primary care lab here.  One was ordered by me and one was ordered this morning by Neuro.

## 2019-08-01 NOTE — PROGRESS NOTES
Patient Name: Dae Amador  MRN: [id number]    CC: New onset numbness on tongue and BUE    HPI: Dae Amador is a 39 y.o. male with no significant PMHx referred for evaluation for new onset sensory symptoms. Patient had multiple episodes of acute onset numbness on right side of bosy, tip of tongue and nose. [facility], work up for acute CVA including neuroimaging, TTE, labs all were unremarkable, except for mild bulged disk in C2-4. Patient had first episode 4 years ago, episode of right side body numbness and half of her nose and the whole tongue went numb, he was driving to  at that time went to ED but upon resolving symptoms w/o any work up he was discharged with diagnosis of panic attack. His recent problems has started over the past few weeks. Usually symptoms are unilateral numbness on the right side of his  Body and face, except the last episode yesterday that started as BUE numbness along with tongue numbness. On the prior visit in ED he was diagnosed as having aTIA and was discharged on ASA and statin with plan to f/u with stroke clinic.  Patient has a chronic stiffness on the right side of his neck and sometimes he feels some pain and stiffness on the back of his head as well.  Patient also mentions a bite on his left leg while was grassing in his yard. He saw his PCP with concern for Lyme's disese but no work up was done. Patient later developed some muscle stiffness in BLE which progressed to a transient body ache and resolved. He does not recall any skin rash, fever, palpitation. Patient was tested for Lyme diseas in ED and Ab test has came back positive. For his anxiety he was prescribed zanax in ED. His symptoms has resolved since getting discharged from ED.      ROS:   Review of Systems   Constitutional: Negative for malaise/fatigue. Negative for weight loss.   HENT: Negative for hearing loss.   Eyes: Negative for blurred vision and double vision.    Respiratory: Negative for shortness of breath and stridor.   Cardiovascular: Negative for chest pain and palpitations.   Gastrointestinal: Negative for nausea, vomiting and constipation.   Genitourinary: Negative for frequency. Negative for urgency.   Musculoskeletal: Negative for joint pain. Negative for myalgias and falls.   Skin: Negative for rash.   Neurological: Negative for dizziness and tremors. Negative for focal weakness and seizures.   Endo/Heme/Allergies: Does not bruise/bleed easily.   Psychiatric/Behavioral: Negative for memory loss. Negative for depression and hallucinations. The patient is not nervous/anxious.      Past Medical History  No past medical history on file.    Medications    Current Outpatient Medications:     ALPRAZolam (XANAX) 0.25 MG tablet, Take 1 tablet (0.25 mg total) by mouth 2 (two) times daily as needed for Anxiety., Disp: 8 tablet, Rfl: 0    aspirin (ECOTRIN) 81 MG EC tablet, Take 1 tablet (81 mg total) by mouth once daily., Disp: , Rfl: 0    atorvastatin (LIPITOR) 20 MG tablet, Take 1 tablet (20 mg total) by mouth once daily., Disp: 90 tablet, Rfl: 3    baclofen (LIORESAL) 10 MG tablet, Take 0.5 tablets (5 mg total) by mouth every evening., Disp: 5 tablet, Rfl: 11    ondansetron (ZOFRAN) 4 MG tablet, Take 1 tablet (4 mg total) by mouth every 8 (eight) hours as needed for Nausea., Disp: 30 tablet, Rfl: 1    Allergies  Review of patient's allergies indicates:  No Known Allergies    Social History  Social History     Socioeconomic History    Marital status:      Spouse name: Not on file    Number of children: Not on file    Years of education: Not on file    Highest education level: Not on file   Occupational History    Not on file   Social Needs    Financial resource strain: Not on file    Food insecurity:     Worry: Not on file     Inability: Not on file    Transportation needs:     Medical: Not on file     Non-medical: Not on file   Tobacco Use    Smoking  status: Never Smoker    Smokeless tobacco: Never Used   Substance and Sexual Activity    Alcohol use: Yes    Drug use: Never    Sexual activity: Yes     Partners: Female   Lifestyle    Physical activity:     Days per week: Not on file     Minutes per session: Not on file    Stress: Not on file   Relationships    Social connections:     Talks on phone: Not on file     Gets together: Not on file     Attends Congregational service: Not on file     Active member of club or organization: Not on file     Attends meetings of clubs or organizations: Not on file     Relationship status: Not on file   Other Topics Concern    Not on file   Social History Narrative    Not on file       Family History  Family History   Problem Relation Age of Onset    No Known Problems Mother     No Known Problems Father     Cancer Maternal Grandmother         unknown details    Melanoma Maternal Grandfather     Diabetes Paternal Grandmother     No Known Problems Paternal Grandfather        Physical Exam  There were no vitals taken for this visit.    General appearance: Well-developed, well-groomed.     Neurologic Exam: The patient is awake, alert and oriented. Language is fluent. Recent and remote memory are normal. Attention span and concentration are normal. Fund of knowledge is appropriate.     Cranial nerves: pupils are round and reactive to light and accommodation. Visual fields are full to confrontation. Ocular motility is full in all cardinal positions of gaze. Facial sensation is normal to pinprick and light touch. Corneal reflexes are present bilaterally. Facial activation is symmetric. Hearing is normal bilaterally. Palate elevates symmetrically and gag reflex is intact bilaterally. Shoulder elevation is symmetric and full strength bilaterally. Tongue is midline and neck rotation strength is normal bilaterally. Neck range of motion is normal.     Motor examination of all extremities demonstrates normal bulk and tone in all  four limbs. There are no atrophy or fasciculations. Strength is 5/5 in the upper and lower extremities bilaterally without pronator drift.     Sensory examination is normal to pinprick, vibration and proprioception in the upper and lower extremities bilaterally. Romberg is negative.    Deep tendon reflexes are 2+ and symmetric in the upper and lower extremities bilaterally. Toes are mute bilaterally.     Gait: Normal heel, toe, tandem, and casual gait.    Coordination: Finger to nose and heel to shin testing is normal in both upper and lower extremities. Rapid alternating movements are normal in both upper and lower extremities.     General exam  Cardiovascular: regular rate and rhythm with no murmurs, rubs or gallops. There are no carotid or vertebral artery bruits. Pulses in both upper and lower extremities are symmetric. There is no peripheral edema.   Head and neck: no cervical lymphadenopathy      Lab and Test Results    WBC   Date Value Ref Range Status   07/29/2019 6.48 3.90 - 12.70 K/uL Final     Hemoglobin   Date Value Ref Range Status   07/29/2019 13.1 (L) 14.0 - 18.0 g/dL Final     Hematocrit   Date Value Ref Range Status   07/29/2019 41.0 40.0 - 54.0 % Final     Platelets   Date Value Ref Range Status   07/29/2019 157 150 - 350 K/uL Final     Glucose   Date Value Ref Range Status   07/29/2019 107 70 - 110 mg/dL Final     Sodium   Date Value Ref Range Status   07/29/2019 135 (L) 136 - 145 mmol/L Final     Potassium   Date Value Ref Range Status   07/29/2019 4.0 3.5 - 5.1 mmol/L Final     Chloride   Date Value Ref Range Status   07/29/2019 100 95 - 110 mmol/L Final     CO2   Date Value Ref Range Status   07/29/2019 28 23 - 29 mmol/L Final     BUN, Bld   Date Value Ref Range Status   07/29/2019 10 6 - 20 mg/dL Final     Creatinine   Date Value Ref Range Status   07/29/2019 0.9 0.5 - 1.4 mg/dL Final     Calcium   Date Value Ref Range Status   07/29/2019 9.1 8.7 - 10.5 mg/dL Final     Magnesium   Date Value  Ref Range Status   07/29/2019 2.1 1.6 - 2.6 mg/dL Final     Phosphorus   Date Value Ref Range Status   07/29/2019 3.2 2.7 - 4.5 mg/dL Final     Alkaline Phosphatase   Date Value Ref Range Status   07/29/2019 55 55 - 135 U/L Final     ALT   Date Value Ref Range Status   07/29/2019 73 (H) 10 - 44 U/L Final     AST   Date Value Ref Range Status   07/29/2019 33 10 - 40 U/L Final         Images: Mri brain no acute findings, MRI neck C2-4 disk bulding      Assessment and Plan    Dae Amador is a 39 y.o. male with multiple episodes of hemisonsory deficit without any residual neurological deficit, had CTH, MRI brain, MRI neck, and cervical US which was remarkable for 39% BL carotid stenosis, and cervical spine disc bulging. Patient's symptoms are concerning for TIA but given negative MRI brain and the history of symptoms since 4 years ago in a healthy young male it is very low on the differential list. Patient will f/u with stroke clinic to complete the work up.   Patients symptoms are more compatible with migraine episodes with aura as hemisensory deficit and hemiplegia.   Positive serology for Lyme disease needs to be get confirmed by  western blot test. Although given the timeline of symptoms, beginning 4 years ago, not having a rash, low prevalence of lyme in this area, does not seem to be the etiology of patient's symptoms.    Plan:  - baclofen 5mg qhs for neck stiffness  - f/u with stroke clinic   - Lyme disease western blot      Problem List Items Addressed This Visit     None      Visit Diagnoses     Positive Lyme disease serology    -  Primary    Relevant Orders    LYME DISEASE WESTERN BLOT    History of stiff neck        Relevant Medications    baclofen (LIORESAL) 10 MG tablet                 Karolyn Day MD  Neurology Resident   [facility]  [address] [place] [address]

## 2019-08-02 LAB
COPPER SERPL-MCNC: 878 UG/L (ref 665–1480)
VIT B1 BLD-MCNC: 39 UG/L (ref 38–122)
ZINC SERPL-MCNC: 82 UG/DL (ref 60–130)

## 2019-08-03 ENCOUNTER — PATIENT MESSAGE (OUTPATIENT)
Dept: INTERNAL MEDICINE | Facility: CLINIC | Age: 39
End: 2019-08-03

## 2019-08-03 LAB
A-TOCOPHEROL VIT E SERPL-MCNC: 1239 UG/DL (ref 500–1800)
METHYLMALONATE SERPL-SCNC: 0.13 UMOL/L
PYRIDOXAL SERPL-MCNC: 39 UG/L (ref 5–50)
VIT B2 SERPL-MCNC: 9 MCG/L (ref 1–19)

## 2019-08-04 LAB
EV RNA SPEC QL NAA+PROBE: POSITIVE
WNV RNA SPEC QL NAA+PROBE: NEGATIVE

## 2019-08-05 ENCOUNTER — PATIENT MESSAGE (OUTPATIENT)
Dept: INTERNAL MEDICINE | Facility: CLINIC | Age: 39
End: 2019-08-05

## 2019-08-05 ENCOUNTER — TELEPHONE (OUTPATIENT)
Dept: INTERNAL MEDICINE | Facility: CLINIC | Age: 39
End: 2019-08-05

## 2019-08-05 NOTE — TELEPHONE ENCOUNTER
Dr Sequeira patient    Needs Infectious Disease appt- please schedule as soon as possible    Not sure what is going on with that-  she has been trying since last week to get him in    Fever, positive Lyme disease blood test, stroke like symptoms, several ER visits

## 2019-08-07 ENCOUNTER — PATIENT MESSAGE (OUTPATIENT)
Dept: INTERNAL MEDICINE | Facility: CLINIC | Age: 39
End: 2019-08-07

## 2019-08-07 NOTE — PROGRESS NOTES
I have reviewed the history and physical, assessments, and plan, I concur with her/his documentation of Dae Amador. Patient was seen and examined with the resident. He was still convinced that he had Lyme despite no known tick bite or possible stroke despite no stroke on MRI. He has had very similar symptoms 1-2x per year for the past 4 years, but the most recent episode has been the worst. It is very confusing whether he has headaches with every episode, but one possibility is that of complex migraine. He is open to at least trying a muscle relaxer for neck stiffness but not to other headache agents at this time as he insists that he had a stroke. Will confirm Lyme testing and have encouraged him to keep next vascular neurology appt.      Ariana Haddad MD  General Neurology Staff  [facility]

## 2019-08-08 ENCOUNTER — OFFICE VISIT (OUTPATIENT)
Dept: INFECTIOUS DISEASES | Facility: CLINIC | Age: 39
End: 2019-08-08
Payer: COMMERCIAL

## 2019-08-08 VITALS
HEART RATE: 81 BPM | DIASTOLIC BLOOD PRESSURE: 73 MMHG | SYSTOLIC BLOOD PRESSURE: 117 MMHG | HEIGHT: 70 IN | BODY MASS INDEX: 25.03 KG/M2 | TEMPERATURE: 99 F | WEIGHT: 174.81 LBS

## 2019-08-08 DIAGNOSIS — R74.01 TRANSAMINITIS: ICD-10-CM

## 2019-08-08 DIAGNOSIS — B34.1 PCR POSITIVE FOR ENTEROVIRUS: ICD-10-CM

## 2019-08-08 DIAGNOSIS — R29.90 EPISODE OF TRANSIENT NEUROLOGIC SYMPTOMS: ICD-10-CM

## 2019-08-08 DIAGNOSIS — R50.9 FUO (FEVER OF UNKNOWN ORIGIN): Primary | ICD-10-CM

## 2019-08-08 PROCEDURE — 99999 PR PBB SHADOW E&M-EST. PATIENT-LVL III: ICD-10-PCS | Mod: PBBFAC,,, | Performed by: INTERNAL MEDICINE

## 2019-08-08 PROCEDURE — 99204 OFFICE O/P NEW MOD 45 MIN: CPT | Mod: S$GLB,,, | Performed by: INTERNAL MEDICINE

## 2019-08-08 PROCEDURE — 99999 PR PBB SHADOW E&M-EST. PATIENT-LVL III: CPT | Mod: PBBFAC,,, | Performed by: INTERNAL MEDICINE

## 2019-08-08 PROCEDURE — 99204 PR OFFICE/OUTPT VISIT, NEW, LEVL IV, 45-59 MIN: ICD-10-PCS | Mod: S$GLB,,, | Performed by: INTERNAL MEDICINE

## 2019-08-08 RX ORDER — DOXYCYCLINE 100 MG/1
CAPSULE ORAL
Refills: 0 | COMMUNITY
Start: 2019-08-01 | End: 2020-09-17

## 2019-08-08 NOTE — LETTER
August 8, 2019      Miladis Sequeira MD  [address]  [facility] [address]           [facility] Infectious Diseases  [address]  [facility] [address]  Phone: [phone]  Fax: [phone]          Patient: Dae Amador   MR Number: [id number]   YOB: 1980   Date of Visit: 8/8/2019       Dear Dr. Washburn:    Thank you for referring Dae Amador to me for evaluation. Attached you will find relevant portions of my assessment and plan of care.    If you have questions, please do not hesitate to call me. I look forward to following Dae Amador along with you.    Sincerely,    Shahla Monzon MD    Enclosure  CC:  No Recipients    If you would like to receive this communication electronically, please contact [email or web address] or [phone] to request more information on [employer] access.    For providers and/or their staff who would like to refer a patient to [facility], please contact us through our one-stop-shop provider referral line, [facility], at [phone].    If you feel you have received this communication in error or would no longer like to receive these types of communications, please e-mail [email or web address]

## 2019-08-08 NOTE — PROGRESS NOTES
Subjective:      Patient ID: Dae Amador is a 39 y.o. male.    Chief Complaint:Lyme Disease      History of Present Illness  39-year-old male with no PMH presents with concerns for lyme disease.    In early July, he was cutting grass at his home in [place] and he was bit by something.  The following day, he drove up to [facility].  When he arrived, he reported having bilateral calf pain - initially thought related to driving.  However, the myalgias spread up to his neck.  He then started having fevers, malaise, fatigue.  Over the last month, he presented to the ED on several occasions for further evaluation - 7/11/2019, 7/18/2019.      Patient subsequently had an episode which was diagnosed as a TIA - reported entire right arm became numb and paralyzed and traveled to the entire right side of his body and face.  He was taken to the ED - CT / MR brain with no abnormalities.  He was started on aspirin and atorvastatin.      During one of his ED visits, he was given a prescription for doxycycline as empiric treatment for lyme disease.  Patient started taking doxycycline a week ago, reports now feeling much better, closer to his baseline.    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 Negative for chills and weight gain.   HENT: Positive for congestion. Negative for ear pain, hearing loss, hoarse voice, sore throat and tinnitus.    Eyes: Negative for blurred vision, redness and visual disturbance.   Cardiovascular: Negative for chest pain, leg swelling and palpitations.   Respiratory: Negative for cough, hemoptysis, shortness of breath and sputum production.    Hematologic/Lymphatic: Negative for adenopathy. Does not bruise/bleed easily.   Skin: Negative for dry skin, itching, rash and suspicious lesions.   Musculoskeletal: Positive for myalgias and neck pain. Negative for back pain and  joint pain.   Gastrointestinal: Positive for nausea. Negative for abdominal pain, constipation, diarrhea, heartburn and vomiting.   Genitourinary: Negative for dysuria, flank pain, frequency, hematuria, hesitancy and urgency.   Neurological: Positive for dizziness, headaches, numbness, paresthesias and weakness.   Psychiatric/Behavioral: Negative for depression and memory loss. The patient does not have insomnia and is not nervous/anxious.      Objective:   Physical Exam   Constitutional: He is oriented to person, place, and time. He appears well-developed and well-nourished. No distress.   HENT:   Head: Normocephalic and atraumatic.   Eyes: Conjunctivae and EOM are normal.   Neck: Normal range of motion. Neck supple.   Cardiovascular: Normal rate.   Pulmonary/Chest: Effort normal. No respiratory distress.   Abdominal: Soft. He exhibits no distension.   Musculoskeletal: Normal range of motion. He exhibits no edema.   Neurological: He is alert and oriented to person, place, and time.   Skin: Skin is warm and dry. No rash noted. He is not diaphoretic. No erythema.   Psychiatric: He has a normal mood and affect. His behavior is normal.   Vitals reviewed.    Significant results reviewed:  8/1/2019 lyme WB negative    7/31/2019  WNV negative  Enterovirus detection PCR positive    7/29/2019  RPR neg      Component      Latest Ref Rng & Units 7/29/2019 7/27/2019 7/26/2019 7/23/2019   AST      10 - 40 U/L 33 38 46 (H) 72 (H)   ALT      10 - 44 U/L 73 (H) 81 (H) 94 (H) 127 (H)     Component      Latest Ref Rng & Units 7/16/2019 7/15/2019 7/11/2019   AST      10 - 40 U/L 67 (H) 52 (H) 38   ALT      10 - 44 U/L 79 (H) 65 (H) 43     7/11/2019  CXR neg    7/15/2019  CT abd / pelvis possible colitis                Assessment:   39-year-old male with no PMH presents with a month of fevers, malaise, myalgias, labs notable for transaminitis.  Extensive infectious work-up positive for enterovirus PCR.      Suspect patient had  prolonged viral illness which is now slowly resolved - patient notes clinical improvement with doxycycline - unclear if self-resolving illness or bacterial infection treated with doxycycline.    During course of illness, patient with episode of transient neurological symptoms - unclear if related to TIA or infection.    Plan:   - Complete course of doxycycline  - Repeat LFTs to ensure resolution  - HIV    Shahla Monzon MD MPH  Infectious Diseases [facility]

## 2019-08-09 LAB — HMGCR ANTIBODY: <3 UNITS (ref 0–19)

## 2019-08-14 ENCOUNTER — OFFICE VISIT (OUTPATIENT)
Dept: INTERNAL MEDICINE | Facility: CLINIC | Age: 39
End: 2019-08-14
Attending: INTERNAL MEDICINE
Payer: COMMERCIAL

## 2019-08-14 VITALS
BODY MASS INDEX: 24.78 KG/M2 | SYSTOLIC BLOOD PRESSURE: 102 MMHG | HEART RATE: 74 BPM | WEIGHT: 173.06 LBS | OXYGEN SATURATION: 99 % | DIASTOLIC BLOOD PRESSURE: 60 MMHG | HEIGHT: 70 IN

## 2019-08-14 DIAGNOSIS — Z11.4 SCREENING FOR HIV (HUMAN IMMUNODEFICIENCY VIRUS): ICD-10-CM

## 2019-08-14 DIAGNOSIS — R79.89 ELEVATED LFTS: Primary | ICD-10-CM

## 2019-08-14 DIAGNOSIS — D63.8 ANEMIA OF CHRONIC DISEASE: ICD-10-CM

## 2019-08-14 PROCEDURE — 99214 OFFICE O/P EST MOD 30 MIN: CPT | Mod: S$GLB,,, | Performed by: INTERNAL MEDICINE

## 2019-08-14 PROCEDURE — 99999 PR PBB SHADOW E&M-EST. PATIENT-LVL III: ICD-10-PCS | Mod: PBBFAC,,, | Performed by: INTERNAL MEDICINE

## 2019-08-14 PROCEDURE — 99214 PR OFFICE/OUTPT VISIT, EST, LEVL IV, 30-39 MIN: ICD-10-PCS | Mod: S$GLB,,, | Performed by: INTERNAL MEDICINE

## 2019-08-14 PROCEDURE — 99999 PR PBB SHADOW E&M-EST. PATIENT-LVL III: CPT | Mod: PBBFAC,,, | Performed by: INTERNAL MEDICINE

## 2019-08-14 NOTE — PROGRESS NOTES
INTERNAL MEDICINE ESTABLISHED PATIENT VISIT NOTE    Subjective:     Chief Complaint: Follow-up  LFTs     Patient ID: Dae Amador is a 39 y.o. male with fatigue, nonspecific neuro sx, transaminitis, AoCD, last seen by me two weeks ago, here today for f/u.    See last few notes for details of hx leading up to now.  Was seen by ID since last appt who reviewed labs and w/u thus far and suspected prolonged viral illness but advised to complete course of Doxy since he was having clinical improvement on meds and rec repeat LFTs and HIV testing.    Past Medical History:  History reviewed. No pertinent past medical history.    Home Medications:  Prior to Admission medications    Medication Sig Start Date End Date Taking? Authorizing Provider   ALPRAZolam (XANAX) 0.25 MG tablet Take 1 tablet (0.25 mg total) by mouth 2 (two) times daily as needed for Anxiety. 7/31/19 8/30/19  Marielena Mcfarlane MD   aspirin (ECOTRIN) 81 MG EC tablet Take 1 tablet (81 mg total) by mouth once daily. 7/28/19 7/27/20  Sebastián Deluna NP   atorvastatin (LIPITOR) 20 MG tablet Take 1 tablet (20 mg total) by mouth once daily. 7/28/19 7/27/20  Sebastián Deluna NP   baclofen (LIORESAL) 10 MG tablet Take 0.5 tablets (5 mg total) by mouth every evening. 8/1/19 7/31/20  Karolyn Day MD   doxycycline (VIBRAMYCIN) 100 MG Cap TK ONE C PO BID TAT 8/1/19   Historical Provider, MD   ondansetron (ZOFRAN) 4 MG tablet Take 1 tablet (4 mg total) by mouth every 8 (eight) hours as needed for Nausea. 7/15/19   Miladis Sequeira MD       Allergies:  Review of patient's allergies indicates:  No Known Allergies    Social History:  Social History     Tobacco Use    Smoking status: Never Smoker    Smokeless tobacco: Never Used   Substance Use Topics    Alcohol use: Yes    Drug use: Never        Review of Systems   Constitutional: Negative for chills, fatigue and fever.   Respiratory: Negative for cough, chest tightness and shortness of breath.    Cardiovascular:  "Negative for chest pain.   Gastrointestinal: Negative for abdominal pain and blood in stool.   Genitourinary: Negative for dysuria and frequency.         Health Maintenance:     Immunizations:   Influenza rec this Fall  TDap 2017 at [facility]  Prevnar 13 rec at 66  Shingrix rec once back in stock     Cancer Screening:  Colonoscopy: rec at 51    Objective:   /60 (BP Location: Left arm, Patient Position: Sitting, BP Method: Medium (Manual))   Pulse 74   Ht 5' 10" (1.778 m)   Wt 78.5 kg (173 lb 1 oz)   SpO2 99%   BMI 24.83 kg/m²        General: AAO x3, no apparent distress  HEENT: PERRL, OP clear  CV: RRR, no m/r/g  Pulm: Lungs CTAB, no crackles, no wheezes  Abd: s/NT/ND +BS  Extremities: no c/c/e    Labs:     Lab Results   Component Value Date    WBC 6.48 07/29/2019    HGB 13.1 (L) 07/29/2019    HCT 41.0 07/29/2019    MCV 86 07/29/2019     07/29/2019     Sodium   Date Value Ref Range Status   07/29/2019 135 (L) 136 - 145 mmol/L Final     Potassium   Date Value Ref Range Status   07/29/2019 4.0 3.5 - 5.1 mmol/L Final     Chloride   Date Value Ref Range Status   07/29/2019 100 95 - 110 mmol/L Final     CO2   Date Value Ref Range Status   07/29/2019 28 23 - 29 mmol/L Final     Glucose   Date Value Ref Range Status   07/29/2019 107 70 - 110 mg/dL Final     BUN, Bld   Date Value Ref Range Status   07/29/2019 10 6 - 20 mg/dL Final     Creatinine   Date Value Ref Range Status   07/29/2019 0.9 0.5 - 1.4 mg/dL Final     Calcium   Date Value Ref Range Status   07/29/2019 9.1 8.7 - 10.5 mg/dL Final     Total Protein   Date Value Ref Range Status   07/29/2019 7.0 6.0 - 8.4 g/dL Final     Albumin   Date Value Ref Range Status   07/29/2019 4.2 3.5 - 5.2 g/dL Final     Total Bilirubin   Date Value Ref Range Status   07/29/2019 0.5 0.1 - 1.0 mg/dL Final     Comment:     For infants and newborns, interpretation of results should be based  on gestational age, weight and in agreement with " clinical  observations.  Premature Infant recommended reference ranges:  Up to 24 hours.............<8.0 mg/dL  Up to 48 hours............<12.0 mg/dL  3-5 days..................<15.0 mg/dL  6-29 days.................<15.0 mg/dL       Alkaline Phosphatase   Date Value Ref Range Status   07/29/2019 55 55 - 135 U/L Final     AST   Date Value Ref Range Status   07/29/2019 33 10 - 40 U/L Final     ALT   Date Value Ref Range Status   07/29/2019 73 (H) 10 - 44 U/L Final     Anion Gap   Date Value Ref Range Status   07/29/2019 7 (L) 8 - 16 mmol/L Final     eGFR if    Date Value Ref Range Status   07/29/2019 >60.0 >60 mL/min/1.73 m^2 Final     eGFR if non    Date Value Ref Range Status   07/29/2019 >60.0 >60 mL/min/1.73 m^2 Final     Comment:     Calculation used to obtain the estimated glomerular filtration  rate (eGFR) is the CKD-EPI equation.        Lab Results   Component Value Date    HGBA1C 5.4 07/26/2019     Lab Results   Component Value Date    LDLCALC 75.4 07/26/2019     Lab Results   Component Value Date    TSH 1.324 07/26/2019     Lab Results   Component Value Date    HIV1X2 Negative 05/16/2006         Assessment/Plan     Dae was seen today for follow-up.    Diagnoses and all orders for this visit:    Elevated LFTs  Recent acute illness now appears resolving, unsure if self-limiting vs 2/2 doxycycline, seen by ID c plan to cont course of tx.  Will repeat labs now.  -     Comprehensive metabolic panel; Future    Anemia of chronic disease  Mild  Will repeat now  -     CBC auto differential; Future    Screening for HIV (human immunodeficiency virus)  -     HIV 1/2 Ag/Ab (4th Gen); Future    Hx TIA  Plan for f/u c neurovasc soon to discuss, unsure if related to acute illness but neuro sx now appear resolved.    Hx pulm nodule  micronodule  Not req f/u in low risk patient; however, could consider f/u CT in a year if pt concerned.      HM as above  RTC in 6 mos, sooner if needed and  depending on labs.    Miladis Sequeira MD  Department of Internal Medicine - [facility]  08/14/2019

## 2019-08-15 LAB
B BURGDOR AB SER IA-ACNC: 4.03 INDEX VALUE
B BURGDOR IGG SER QL IB: NORMAL
B BURGDOR IGM SER QL IB: NORMAL

## 2019-08-26 ENCOUNTER — OFFICE VISIT (OUTPATIENT)
Dept: NEUROLOGY | Facility: CLINIC | Age: 39
End: 2019-08-26
Payer: COMMERCIAL

## 2019-08-26 VITALS — HEIGHT: 70 IN | WEIGHT: 173.06 LBS | BODY MASS INDEX: 24.78 KG/M2

## 2019-08-26 DIAGNOSIS — R20.2 PARESTHESIAS: Primary | ICD-10-CM

## 2019-08-26 PROCEDURE — 99999 PR PBB SHADOW E&M-EST. PATIENT-LVL II: ICD-10-PCS | Mod: PBBFAC,,, | Performed by: PSYCHIATRY & NEUROLOGY

## 2019-08-26 PROCEDURE — 99999 PR PBB SHADOW E&M-EST. PATIENT-LVL II: CPT | Mod: PBBFAC,,, | Performed by: PSYCHIATRY & NEUROLOGY

## 2019-08-26 PROCEDURE — 99214 PR OFFICE/OUTPT VISIT, EST, LEVL IV, 30-39 MIN: ICD-10-PCS | Mod: S$GLB,,, | Performed by: PSYCHIATRY & NEUROLOGY

## 2019-08-26 PROCEDURE — 99214 OFFICE O/P EST MOD 30 MIN: CPT | Mod: S$GLB,,, | Performed by: PSYCHIATRY & NEUROLOGY

## 2019-08-26 NOTE — PROGRESS NOTES
"  Dae Amador is a 39 y.o. year old male that  presents for follow up of body paresthesias.    HPI:  Mr Amador returns to the office for a follow up visit.  He is an essentially healthy 38 y/o who was seen in ED during a Stroke Code on Monday 07/29/19 with symptoms of acute onset R sided weakness-numbness, R facial drop, and confusion. He was admitted to our stroke service and underwent extensive but unrevealing stroke work up and his symptoms resolved in less than 24 hours ago, thus a diagnosis of possible TIA was considered. ECHO done as outpatient was negative.  Then, patient returned to the ED couple days after being discharge and at that time was experiencing tingling and numbness bilateral upper extremities. Of note, MRI cervical spine showed multilevel degenerative changes with varying degrees of neural foraminal stenosis but no cord involvement.   Likewise, he had comprehensive serologies testing searching for a post infectious or autoimmune process but once again all tests were unimpressive. It is worth noting that on 8/8 he was also seen by ID due to some concern for Lyme disease despite no known tick bite but confirmatory Lyme disease testing was negative.  He indicated that he completed a 21 day of Doxycycline and is feeling much better, " only having minor numbness in my nose and R upper extremity.  Denies HA, vertigo, double vision, focal weakness, trouble swallowing, imbalance, slurred speech, memory loss, language or vision impairment.  Please, see attach cerebrovascular history for further details regarding his admission to our stroke service.    Cerebrovascular History:  "presented to the ER with acute onset right facial droop, RSW and numbness and confusion. History obtained from patient and his wife at bedside. OKN 12:20pm.  Patient was getting his hair cut and just as he finished, he started to experience a tightness in his chest followed by a right facial droop, RSW/N with confusion. Symptoms " were noted by his friend and his carey. By the time he arrived to ER symptoms were noted to be improving; NIHSS 0. He was oriented and following simple commands, although, he was still complaining of right sided numbness and was having difficulty following 2 step commands and performing learned motor tasks. CT head showed no acute abnormality. MRI Ischemic Protocol showed no evidence of an acute infarct or stenosis. However, incidentally detected L occipital venous anomaly. MRI brain w/ w/o contrast no definite venous anomaly.  Patient's wife reported that he had a simialr episode around 4 yrs ago. Experienced sudden onset right sided numbness. Was taken to [facility], although wife says that he was not scanned or worked up.  No h/o tobacco use/IVDU. Recetly started on Cipro and Flagyl for colitis. No prior history of strokes.  Stroke work up complete. ASA and statin started. Echo results pending. Need to follow up with results once discharged. Follow up in VN clinic in 4-6 weeks of discharge.    Hospital Course (synopsis of major diagnoses, care, treatment, and services provided during the course of the hospital stay): 7/26 - TIA. Admitted to observation for stroke workup.  7/27 - Echo complete, results pending. MRI brain w/ contrast to r/o DVA, negative. OT cleared home no therapy needs. SLP cleared regular, thin diet. Ambulating well in room. NAEON. Discharge home today.    Stroke Etiology: Not Applicable/Not Ischemic Stroke     STROKE DOCUMENTATION   Acute Stroke Times   Last Known Normal Date: 07/26/19  Last Known Normal Time: 1220  Symptom Onset Date: 07/26/19  Symptom Onset Time: 1229  Stroke Team Called Date: 07/26/19  Stroke Team Called Time: 1251  Stroke Team Arrival Date: 07/26/19  Stroke Team Arrival Time: 1254      NIH Scale:  1a. Level of Consciousness: 0-->Alert, keenly responsive  1b. LOC Questions: 0-->Answers both questions correctly  1c. LOC Commands: 0-->Performs both tasks correctly  2. Best Gaze:  0-->Normal  3. Visual: 0-->No visual loss  4. Facial Palsy: 0-->Normal symmetrical movements  5a. Motor Arm, Left: 0-->No drift, limb holds 90 (or 45) degrees for full 10 secs  5b. Motor Arm, Right: 0-->No drift, limb holds 90 (or 45) degrees for full 10 secs  6a. Motor Leg, Left: 0-->No drift, leg holds 30 degree position for full 5 secs  6b. Motor Leg, Right: 0-->No drift, leg holds 30 degree position for full 5 secs  7. Limb Ataxia: 0-->Absent  8. Sensory: 0-->Normal, no sensory loss  9. Best Language: 0-->No aphasia, normal  10. Dysarthria: 0-->Normal  11. Extinction and Inattention (formerly Neglect): 0-->No abnormality  Total (NIH Stroke Scale): 0     Modified Henagar Score: 0  Augustine Coma Scale:    ABCD2 Score: 3  [id number] Score:   HAS -BLED Score:   ICH Score:   [employer] Classification:         Diagnostic Results:        MRI brain W/ W/O Contrast 7/27/19  No acute intracranial abnormality.    No definite DVA identified.     MRI Ischemic Protocol 7/25/19 -      No evidence of acute infarct or stenosis.     CT Head 7/26/19 -      There is no evidence of acute major vascular territory infarct, hemorrhage, or mass.  There is no hydrocephalus.  There are no abnormal extra-axial fluid collections.  There are a few punctate foci of hypoattenuation within the periphery of the inferior most left occipital lobe, noting these are in a region of artifact and best visualized on the thin cut images.  Findings are nonspecific.  The paranasal sinuses and mastoid air cells are clear, and there is no evidence of calvarial fracture.  The visualized soft tissues are unremarkable.     Cardiac Evaluation:      TTE  7/27/19 results pending        Interventions: None     Complications: None     Disposition: Home or Self Care           Final Active Diagnoses:     Diagnosis Date Noted POA    PRINCIPAL PROBLEM:  Aphasia [R47.01] 07/26/2019 Yes    Hyperlipidemia [E78.5] 07/27/2019 Yes       Problems Resolved During this  Admission:      * Aphasia  39 yr old male with no vascular risk factors with acute onset RSW/N with facial droop and aphasia. LKN 12:20PM. On arrival to the ER, his symptoms had improved. NIHSS was 0. VAN-. He did appears to have questionable weakness in the RUE and was still complaining of right sided numbness (sensory exam was normal). He was alert and oriented x3 and following simple commands. However, he was unable to follow 2 step commands. Also appeared to have apraxia, acalculia with poor attention and concentration.   Of note, he was able to give a reasonable history of his presentation but appeared frustrated when asked to perform a complex task or calculation as he was unable to do so.  CT head and MRI Ischemic Protocol were both unremarkable save for an incidentally detected L occipital venous anomaly. MRI brain w/ w/o contrast no definite venous anomaly.     -- As this is second episode of transient neurological deficits, will admit for stroke workup, notably TTE.        Antithrombotics for secondary stroke prevention: Antiplatelets: Aspirin: 81 mg daily     Statins for secondary stroke prevention and hyperlipidemia, if present:   Statins: Atorvastatin- 20  mg daily     Aggressive risk factor modification: None     Rehab efforts: The patient has been evaluated by a stroke team provider and the therapy needs have been fully considered based off the presenting complaints and exam findings. The following therapy evaluations are needed: SLP evaluate and treat - regular diet     Diagnostics ordered/pending: TTE to assess cardiac function/status      VTE prophylaxis: Heparin 5000 units SQ every 8 hours, SCDs     BP parameters: SBP<140         Hyperlipidemia  Stroke risk factor   LDL 75  Atorvastatin 20     Recommendations:      Post-discharge complication risks: Falls     Stroke Education given to: patient and family     Follow-up in Stroke Clinic in 4-6 weeks.      Discharge Plan:  Antithrombotics: Aspirin 81  mg  Statin: Atorvastatin 20 mg  Aggresive risk factor modification:  High Cholesterol  Diet     Patient Instructions:       Activity as tolerated         Medications:  Reconciled Home Medications:       Medication List       START taking these medications    aspirin 81 MG EC tablet  Commonly known as:  ECOTRIN  Take 1 tablet (81 mg total) by mouth once daily.  Start taking on:  7/28/2019      atorvastatin 20 MG tablet  Commonly known as:  LIPITOR  Take 1 tablet (20 mg total) by mouth once daily.  Start taking on:  7/28/2019          CONTINUE taking these medications    ondansetron 4 MG tablet  Commonly known as:  ZOFRAN  Take 1 tablet (4 mg total) by mouth every 8 (eight) hours      No past medical history on file.  Social History     Socioeconomic History    Marital status:      Spouse name: Not on file    Number of children: Not on file    Years of education: Not on file    Highest education level: Not on file   Occupational History    Not on file   Social Needs    Financial resource strain: Not on file    Food insecurity:     Worry: Not on file     Inability: Not on file    Transportation needs:     Medical: Not on file     Non-medical: Not on file   Tobacco Use    Smoking status: Never Smoker    Smokeless tobacco: Never Used   Substance and Sexual Activity    Alcohol use: Yes    Drug use: Never    Sexual activity: Yes     Partners: Female   Lifestyle    Physical activity:     Days per week: Not on file     Minutes per session: Not on file    Stress: Not on file   Relationships    Social connections:     Talks on phone: Not on file     Gets together: Not on file     Attends Alevism service: Not on file     Active member of club or organization: Not on file     Attends meetings of clubs or organizations: Not on file     Relationship status: Not on file   Other Topics Concern    Not on file   Social History Narrative    Not on file     Past Surgical History:   Procedure Laterality Date  "   FINGER FRACTURE SURGERY Right     index finger, flag football injury     Family History   Problem Relation Age of Onset    No Known Problems Mother     No Known Problems Father     Cancer Maternal Grandmother         unknown details    Melanoma Maternal Grandfather     Diabetes Paternal Grandmother     No Known Problems Paternal Grandfather        Review of Systems  General ROS: negative for chills, fever or weight loss  Psychological ROS: negative for hallucination, depression or suicidal ideation  Ophthalmic ROS: negative for blurry vision, photophobia or eye pain  ENT ROS: negative for epistaxis, sore throat or rhinorrhea  Respiratory ROS: no cough, shortness of breath, or wheezing  Cardiovascular ROS: no chest pain or dyspnea on exertion  Gastrointestinal ROS: no abdominal pain, change in bowel habits, or black/ bloody stools  Genito-Urinary ROS: no dysuria, trouble voiding, or hematuria  Musculoskeletal ROS: negative for gait disturbance or muscular weakness  Neurological ROS: no syncope or seizures; no ataxia  Dermatological ROS: negative for pruritis, rash and jaundice      Physical Exam:  Ht 5' 10" (1.778 m)   Wt 78.5 kg (173 lb 1 oz)   BMI 24.83 kg/m²   General appearance: alert, cooperative, no distress  Constitutional:Oriented to person, place, and time.appears well-developed and well-nourished.   HEENT: Normocephalic, atraumatic, neck symmetrical, no nasal discharge   Eyes: conjunctivae/corneas clear, PERRL, EOM's intact  Lungs: clear to auscultation bilaterally, no dullness to percussion bilaterally  Heart: regular rate and rhythm without rub; no displacement of the PMI   Abdomen: soft, non-tender; bowel sounds normoactive; no organomegaly  Extremities: extremities symmetric; no clubbing, cyanosis, or edema  Integument: Skin color, texture, turgor normal; no rashes; hair distrubution normal  Neurologic:   Mental status: alert and awake, oriented x 4, comprehension, naming, and repetition " intact. No right to left confusion. Performs serial 7's without difficulty .No dysarthria.  CN 2-12: pupils 4 mm bilaterally, reactive to light. Fundi without papilledema. Visual fields full to confrontation. EOM full without nystagmus. Face sensation normal in all distributions. Face symmetric. Hearing grossly intact. Palate elevates well. Tongue midline without atrophy or fasciculations.  Motor: 5/5 all over  Sensory: intact in all modalities.  DTR's: 2+ all over.  Plantars: no tested.  Coordination: finger to nose and heel-knee-shin intact.  Gait: no ataxia or bradykinesia    LABS:    Complete Blood Count  Lab Results   Component Value Date    RBC 4.70 08/14/2019    HGB 13.0 (L) 08/14/2019    HCT 39.9 (L) 08/14/2019    MCV 85 08/14/2019    MCH 27.7 08/14/2019    MCHC 32.6 08/14/2019    RDW 12.7 08/14/2019     (L) 08/14/2019    MPV 11.9 08/14/2019    GRAN 1.9 08/14/2019    GRAN 43.2 08/14/2019    LYMPH 2.1 08/14/2019    LYMPH 46.2 08/14/2019    MONO 0.4 08/14/2019    MONO 8.9 08/14/2019    EOS 0.1 08/14/2019    BASO 0.02 08/14/2019    EOSINOPHIL 1.3 08/14/2019    BASOPHIL 0.4 08/14/2019    DIFFMETHOD Automated 08/14/2019       Comprehensive Metabolic Panel  Lab Results   Component Value Date    GLU 75 08/14/2019    BUN 8 08/14/2019    CREATININE 0.8 08/14/2019     08/14/2019    K 4.2 08/14/2019     08/14/2019    PROT 6.9 08/14/2019    ALBUMIN 4.3 08/14/2019    BILITOT 0.4 08/14/2019    AST 29 08/14/2019    ALKPHOS 58 08/14/2019    CO2 28 08/14/2019    ALT 45 (H) 08/14/2019    ANIONGAP 6 (L) 08/14/2019    EGFRNONAA >60 08/14/2019    ESTGFRAFRICA >60 08/14/2019       TSH  Lab Results   Component Value Date    TSH 1.324 07/26/2019         Assessment: Pleasant 38 y/o with R upper extremity paresthesias, normal neuro-exam, and unrevealing brain imaging studies as well as negative serologies.  Etiology unclear, but considering mild cervical radiculopathy.        No diagnosis found.  There were no  encounter diagnoses.      Plan:  1) Paresthesias: will defer further testing at this time as he reports continuous symptomatic improvement.  2) Possible TIA: unclear if in reality he ever had a TIA. Will discontinue statin.    No orders of the defined types were placed in this encounter.          Watson Gallego MD

## 2019-08-26 NOTE — LETTER
August 27, 2019      Karolyn Day MD  [address] [facility] [address]           [facility]  [facility] [facility]  [facility] [address]  Phone: [phone]          Patient: Dae Amador   MR Number: [id number]   YOB: 1980   Date of Visit: 8/26/2019       Dear Dr. Karolyn Day:    Thank you for referring Dae Amador to me for evaluation. Attached you will find relevant portions of my assessment and plan of care.    If you have questions, please do not hesitate to call me. I look forward to following Dae Amador along with you.    Sincerely,    Watson Gallego MD    Enclosure  CC:  No Recipients    If you would like to receive this communication electronically, please contact [email or web address] or [phone] to request more information on [employer] access.    For providers and/or their staff who would like to refer a patient to [facility], please contact us through our one-stop-shop provider referral line, [facility], at [phone].    If you feel you have received this communication in error or would no longer like to receive these types of communications, please e-mail [email or web address]

## 2019-12-02 ENCOUNTER — PATIENT MESSAGE (OUTPATIENT)
Dept: INTERNAL MEDICINE | Facility: CLINIC | Age: 39
End: 2019-12-02

## 2019-12-03 RX ORDER — ESCITALOPRAM OXALATE 10 MG/1
TABLET ORAL
Qty: 94 TABLET | Refills: 1 | Status: SHIPPED | OUTPATIENT
Start: 2019-12-03 | End: 2020-09-17 | Stop reason: ALTCHOICE

## 2019-12-03 NOTE — TELEPHONE ENCOUNTER
Spoke to pt.  Still c occ facial numbness, arm tightness.  Has been seen by neuro more recently who doubted TIA and stopped statin.  Was rx'ed Xanax from previous ED visit which pt states helped c his physical sx several times.  Will try Lexapro to see if pt may have underlying anxiety that is triggering physical sx as w/u thus far has been unrevealing.

## 2019-12-11 ENCOUNTER — PATIENT MESSAGE (OUTPATIENT)
Dept: NEUROLOGY | Facility: CLINIC | Age: 39
End: 2019-12-11

## 2020-01-27 ENCOUNTER — OFFICE VISIT (OUTPATIENT)
Dept: NEUROLOGY | Facility: CLINIC | Age: 40
End: 2020-01-27
Payer: COMMERCIAL

## 2020-01-27 VITALS
BODY MASS INDEX: 24.77 KG/M2 | HEIGHT: 70 IN | DIASTOLIC BLOOD PRESSURE: 70 MMHG | SYSTOLIC BLOOD PRESSURE: 115 MMHG | WEIGHT: 173 LBS | HEART RATE: 68 BPM

## 2020-01-27 DIAGNOSIS — R20.2 PARESTHESIAS: Primary | ICD-10-CM

## 2020-01-27 PROCEDURE — 99214 PR OFFICE/OUTPT VISIT, EST, LEVL IV, 30-39 MIN: ICD-10-PCS | Mod: S$GLB,,, | Performed by: PSYCHIATRY & NEUROLOGY

## 2020-01-27 PROCEDURE — 99999 PR PBB SHADOW E&M-EST. PATIENT-LVL IV: CPT | Mod: PBBFAC,,, | Performed by: PSYCHIATRY & NEUROLOGY

## 2020-01-27 PROCEDURE — 99214 OFFICE O/P EST MOD 30 MIN: CPT | Mod: S$GLB,,, | Performed by: PSYCHIATRY & NEUROLOGY

## 2020-01-27 PROCEDURE — 99999 PR PBB SHADOW E&M-EST. PATIENT-LVL IV: ICD-10-PCS | Mod: PBBFAC,,, | Performed by: PSYCHIATRY & NEUROLOGY

## 2020-01-27 NOTE — PROGRESS NOTES
Dae Amador is a 39 y.o. year old male that  presents for follow up R UE paresthesias and dizziness. He is accompany by his wife.  Chief Complaint   Patient presents with    Numbness    Dizziness    Headache   .     HPI:  Mr Amador returns to the office for a follow up visit. He is an essentially healthy 38 y/o who was seen in ED during a Stroke Code on Monday 07/29/19 with symptoms of acute onset R sided weakness-numbness, R facial drop, and confusion.  He was admitted to our stroke service and underwent extensive but unrevealing stroke work up and his symptoms resolved in less than 24 hours ago, thus a diagnosis of possible TIA was considered. ECHO done as outpatient was negative. MRI cervical spine showed multilevel degenerative changes with varying degrees of neural foraminal stenosis but no cord involvement.   Patient continues experiencing daily tingling-numbness RUE, at times with bilateral face involvement but no reported pain or weakness.  Likewise, he mentioned infrequent episodes of isolated dizziness/lightheadeness usually while standing, last few minutes, and is not associated with nausea, diaphoresis, palpitations, or chest pain.  Mr Amador was started on Lexapro and he said that these symptoms haven't change.  He also complains of occasional HA but denies vertigo, focal weakness, unsteadiness, bladder impairment, focal weakness, slurred speech, language or vision impairment.  On ASA.    No past medical history on file.  Social History     Socioeconomic History    Marital status:      Spouse name: Not on file    Number of children: Not on file    Years of education: Not on file    Highest education level: Not on file   Occupational History    Not on file   Social Needs    Financial resource strain: Not on file    Food insecurity:     Worry: Not on file     Inability: Not on file    Transportation needs:     Medical: Not on file     Non-medical: Not on file   Tobacco Use     "Smoking status: Never Smoker    Smokeless tobacco: Never Used   Substance and Sexual Activity    Alcohol use: Yes    Drug use: Never    Sexual activity: Yes     Partners: Female   Lifestyle    Physical activity:     Days per week: Not on file     Minutes per session: Not on file    Stress: Not on file   Relationships    Social connections:     Talks on phone: Not on file     Gets together: Not on file     Attends [employer] service: Not on file     Active member of club or organization: Not on file     Attends meetings of clubs or organizations: Not on file     Relationship status: Not on file   Other Topics Concern    Not on file   Social History Narrative    Not on file     Past Surgical History:   Procedure Laterality Date    FINGER FRACTURE SURGERY Right     index finger, flag football injury     Family History   Problem Relation Age of Onset    No Known Problems Mother     No Known Problems Father     Cancer Maternal Grandmother         unknown details    Melanoma Maternal Grandfather     Diabetes Paternal Grandmother     No Known Problems Paternal Grandfather            Review of Systems  General ROS: negative for chills, fever or weight loss  Psychological ROS: negative for hallucination, depression or suicidal ideation  Ophthalmic ROS: negative for blurry vision, photophobia or eye pain  ENT ROS: negative for epistaxis, sore throat or rhinorrhea  Respiratory ROS: no cough, shortness of breath, or wheezing  Cardiovascular ROS: no chest pain or dyspnea on exertion  Gastrointestinal ROS: no abdominal pain, change in bowel habits, or black/ bloody stools  Genito-Urinary ROS: no dysuria, trouble voiding, or hematuria  Musculoskeletal ROS: negative for gait disturbance or muscular weakness  Neurological ROS: no syncope or seizures; no ataxia  Dermatological ROS: negative for pruritis, rash and jaundice      Physical Exam:  /70   Pulse 68   Ht 5' 10" (1.778 m)   Wt 78.5 kg (173 lb)   BMI " 24.82 kg/m²   General appearance: alert, cooperative, no distress  Constitutional:Oriented to person, place, and time.appears well-developed and well-nourished.   HEENT: Normocephalic, atraumatic, neck symmetrical, no nasal discharge   Eyes: conjunctivae/corneas clear, PERRL, EOM's intact  Lungs: clear to auscultation bilaterally, no dullness to percussion bilaterally  Heart: regular rate and rhythm without rub; no displacement of the PMI   Abdomen: soft, non-tender; bowel sounds normoactive; no organomegaly  Extremities: extremities symmetric; no clubbing, cyanosis, or edema  Integument: Skin color, texture, turgor normal; no rashes; hair distrubution normal  Neurologic:  Mental status: alert and awake, oriented x 4, comprehension, naming, and repetition intact. No right to left confusion. Performs serial 7's without difficulty .No dysarthria.  CN 2-12: pupils 4 mm bilaterally, reactive to light. Fundi without papilledema. Visual fields full to confrontation. EOM full without nystagmus. Face sensation normal in all distributions. Face symmetric. Hearing grossly intact. Palate elevates well. Tongue midline without atrophy or fasciculations.  Motor: 5/5 all over  Sensory: intact in all modalities.  DTR's: 2+ all over.  Plantars: no tested.  Coordination: finger to nose and heel-knee-shin intact.  Gait: no ataxia or bradykinesia    LABS:    Complete Blood Count  Lab Results   Component Value Date    RBC 4.70 08/14/2019    HGB 13.0 (L) 08/14/2019    HCT 39.9 (L) 08/14/2019    MCV 85 08/14/2019    MCH 27.7 08/14/2019    MCHC 32.6 08/14/2019    RDW 12.7 08/14/2019     (L) 08/14/2019    MPV 11.9 08/14/2019    GRAN 1.9 08/14/2019    GRAN 43.2 08/14/2019    LYMPH 2.1 08/14/2019    LYMPH 46.2 08/14/2019    MONO 0.4 08/14/2019    MONO 8.9 08/14/2019    EOS 0.1 08/14/2019    BASO 0.02 08/14/2019    EOSINOPHIL 1.3 08/14/2019    BASOPHIL 0.4 08/14/2019    DIFFMETHOD Automated 08/14/2019       Comprehensive Metabolic  Panel  Lab Results   Component Value Date    GLU 75 08/14/2019    BUN 8 08/14/2019    CREATININE 0.8 08/14/2019     08/14/2019    K 4.2 08/14/2019     08/14/2019    PROT 6.9 08/14/2019    ALBUMIN 4.3 08/14/2019    BILITOT 0.4 08/14/2019    AST 29 08/14/2019    ALKPHOS 58 08/14/2019    CO2 28 08/14/2019    ALT 45 (H) 08/14/2019    ANIONGAP 6 (L) 08/14/2019    EGFRNONAA >60 08/14/2019    ESTGFRAFRICA >60 08/14/2019       TSH  Lab Results   Component Value Date    TSH 1.324 07/26/2019         Assessment: Healthy 40 y/o with persistent RUE and bilateral face paresthesias but neuro exam and prior neuro work up that was not particularly revealing.  No symptomatic improvement despite being on Lexapro for couple months.  Unclear etiology at this time.  Will pursue EMG/NCV searching for a peripheral cause. Re image his brain and MRA neck.        ICD-10-CM ICD-9-CM    1. Paresthesias R20.2 782.0 EMG W/ ULTRASOUND AND NERVE CONDUCTION TEST 1 Extremity      MRI Brain W WO Contrast      MRA Neck without contrast     The encounter diagnosis was Paresthesias.      Plan:  1) R UE paresthesias: EMG/NCV  2) Dizziness: MRI/MRA brain and neck.  3) Anxiety, on Lexapro      Orders Placed This Encounter   Procedures    MRI Brain W WO Contrast    MRA Neck without contrast    EMG W/ ULTRASOUND AND NERVE CONDUCTION TEST 1 Extremity           Watson Gallego MD

## 2020-01-31 ENCOUNTER — HOSPITAL ENCOUNTER (OUTPATIENT)
Dept: RADIOLOGY | Facility: HOSPITAL | Age: 40
Discharge: HOME | End: 2020-01-31
Attending: PSYCHIATRY & NEUROLOGY
Payer: COMMERCIAL

## 2020-01-31 DIAGNOSIS — R20.2 PARESTHESIAS: ICD-10-CM

## 2020-01-31 PROCEDURE — 70548 MRA NECK WITH CONTRAST: ICD-10-PCS | Mod: 26,,, | Performed by: RADIOLOGY

## 2020-01-31 PROCEDURE — 70553 MRI BRAIN STEM W/O & W/DYE: CPT | Mod: 26,,, | Performed by: RADIOLOGY

## 2020-01-31 PROCEDURE — 70548 MR ANGIOGRAPHY NECK W/DYE: CPT | Mod: TC

## 2020-01-31 PROCEDURE — 25500020 PHARM REV CODE 255: Performed by: PSYCHIATRY & NEUROLOGY

## 2020-01-31 PROCEDURE — A9585 GADOBUTROL INJECTION: HCPCS | Performed by: PSYCHIATRY & NEUROLOGY

## 2020-01-31 PROCEDURE — 70553 MRI BRAIN STEM W/O & W/DYE: CPT | Mod: TC

## 2020-01-31 PROCEDURE — 70553 MRI BRAIN W WO CONTRAST: ICD-10-PCS | Mod: 26,,, | Performed by: RADIOLOGY

## 2020-01-31 PROCEDURE — 70548 MR ANGIOGRAPHY NECK W/DYE: CPT | Mod: 26,,, | Performed by: RADIOLOGY

## 2020-01-31 RX ORDER — GADOBUTROL 604.72 MG/ML
8 INJECTION INTRAVENOUS
Status: COMPLETED | OUTPATIENT
Start: 2020-01-31 | End: 2020-01-31

## 2020-01-31 RX ADMIN — GADOBUTROL 8 ML: 604.72 INJECTION INTRAVENOUS at 03:01

## 2020-02-07 ENCOUNTER — DOCUMENTATION ONLY (OUTPATIENT)
Dept: NEUROLOGY | Facility: CLINIC | Age: 40
End: 2020-02-07

## 2020-02-07 NOTE — PROGRESS NOTES
Patient was scheduled and confirmed for EMG procedure but was a no-show.    Alfa Young MD  [facility]

## 2020-02-17 ENCOUNTER — PROCEDURE VISIT (OUTPATIENT)
Dept: NEUROLOGY | Facility: CLINIC | Age: 40
End: 2020-02-17
Payer: COMMERCIAL

## 2020-02-17 DIAGNOSIS — R20.2 PARESTHESIAS: ICD-10-CM

## 2020-02-17 PROCEDURE — 95912 NRV CNDJ TEST 11-12 STUDIES: CPT | Mod: S$GLB,,, | Performed by: PSYCHIATRY & NEUROLOGY

## 2020-02-17 PROCEDURE — 95886 PR EMG COMPLETE, W/ NERVE CONDUCTION STUDIES, 5+ MUSCLES: ICD-10-PCS | Mod: S$GLB,,, | Performed by: PSYCHIATRY & NEUROLOGY

## 2020-02-17 PROCEDURE — 95886 MUSC TEST DONE W/N TEST COMP: CPT | Mod: S$GLB,,, | Performed by: PSYCHIATRY & NEUROLOGY

## 2020-02-17 PROCEDURE — 95912 PR NERVE CONDUCTION STUDY; 11 -12 STUDIES: ICD-10-PCS | Mod: S$GLB,,, | Performed by: PSYCHIATRY & NEUROLOGY

## 2020-02-19 ENCOUNTER — OFFICE VISIT (OUTPATIENT)
Dept: NEUROLOGY | Facility: CLINIC | Age: 40
End: 2020-02-19
Payer: COMMERCIAL

## 2020-02-19 VITALS
BODY MASS INDEX: 24.77 KG/M2 | HEIGHT: 70 IN | SYSTOLIC BLOOD PRESSURE: 126 MMHG | DIASTOLIC BLOOD PRESSURE: 76 MMHG | HEART RATE: 69 BPM | WEIGHT: 173 LBS

## 2020-02-19 DIAGNOSIS — R20.2 PARESTHESIAS: Primary | ICD-10-CM

## 2020-02-19 PROCEDURE — 99999 PR PBB SHADOW E&M-EST. PATIENT-LVL III: ICD-10-PCS | Mod: PBBFAC,,, | Performed by: PSYCHIATRY & NEUROLOGY

## 2020-02-19 PROCEDURE — 99999 PR PBB SHADOW E&M-EST. PATIENT-LVL III: CPT | Mod: PBBFAC,,, | Performed by: PSYCHIATRY & NEUROLOGY

## 2020-02-19 PROCEDURE — 99214 PR OFFICE/OUTPT VISIT, EST, LEVL IV, 30-39 MIN: ICD-10-PCS | Mod: S$GLB,,, | Performed by: PSYCHIATRY & NEUROLOGY

## 2020-02-19 PROCEDURE — 99214 OFFICE O/P EST MOD 30 MIN: CPT | Mod: S$GLB,,, | Performed by: PSYCHIATRY & NEUROLOGY

## 2020-02-21 NOTE — PROGRESS NOTES
Dae Amdaor is a 39 y.o. year old male that  presents to discuss the results of his neurological testing.    HPI:  Mr Amador returns to the office for a follow up visit. He is an essentially healthy 38 y/o who was seen in ED during a Stroke Code on Monday 07/29/19 with symptoms of acute onset R sided weakness-numbness, R facial drop, and confusion.  At that dre, he was admitted to our stroke service and underwent extensive but unrevealing stroke work up and his symptoms resolved in less than 24 hours ago, thus a diagnosis of possible TIA was considered. ECHO done as outpatient was negative. MRI cervical spine showed multilevel degenerative changes with varying degrees of neural foraminal stenosis but no cord involvement.   When I saw him at his last clinic visit, he as still experiencing daily tingling-numbness RUE, at times with bilateral face involvement but no reported pain or weakness and thus decided to repeat MRI brain, MRA brain, and also obtained EMG/NCV. All these testing are unremarkable.  Mr Amador said that he is doing much better and the R arm symptoms are less noticeable.  Stopped atorvastatin.    No past medical history on file.  Social History     Socioeconomic History    Marital status:      Spouse name: Not on file    Number of children: Not on file    Years of education: Not on file    Highest education level: Not on file   Occupational History    Not on file   Social Needs    Financial resource strain: Not on file    Food insecurity:     Worry: Not on file     Inability: Not on file    Transportation needs:     Medical: Not on file     Non-medical: Not on file   Tobacco Use    Smoking status: Never Smoker    Smokeless tobacco: Never Used   Substance and Sexual Activity    Alcohol use: Yes    Drug use: Never    Sexual activity: Yes     Partners: Female   Lifestyle    Physical activity:     Days per week: Not on file     Minutes per session: Not on file    Stress: Not on  "file   Relationships    Social connections:     Talks on phone: Not on file     Gets together: Not on file     Attends [employer]oration[employer] service: Not on file     Active member of club or organization: Not on file     Attends meetings of clubs or organizations: Not on file     Relationship status: Not on file   Other Topics Concern    Not on file   Social History Narrative    Not on file     Past Surgical History:   Procedure Laterality Date    FINGER FRACTURE SURGERY Right     index finger, flag football injury     Family History   Problem Relation Age of Onset    No Known Problems Mother     No Known Problems Father     Cancer Maternal Grandmother         unknown details    Melanoma Maternal Grandfather     Diabetes Paternal Grandmother     No Known Problems Paternal Grandfather            Review of Systems  General ROS: negative for chills, fever or weight loss  Psychological ROS: negative for hallucination, depression or suicidal ideation  Ophthalmic ROS: negative for blurry vision, photophobia or eye pain  ENT ROS: negative for epistaxis, sore throat or rhinorrhea  Respiratory ROS: no cough, shortness of breath, or wheezing  Cardiovascular ROS: no chest pain or dyspnea on exertion  Gastrointestinal ROS: no abdominal pain, change in bowel habits, or black/ bloody stools  Genito-Urinary ROS: no dysuria, trouble voiding, or hematuria  Musculoskeletal ROS: negative for gait disturbance or muscular weakness  Neurological ROS: no syncope or seizures; no ataxia  Dermatological ROS: negative for pruritis, rash and jaundice      Physical Exam:  /76   Pulse 69   Ht 5' 10" (1.778 m)   Wt 78.5 kg (173 lb)   BMI 24.82 kg/m²   General appearance: alert, cooperative, no distress  Constitutional:Oriented to person, place, and time.appears well-developed and well-nourished.   HEENT: Normocephalic, atraumatic, neck symmetrical, no nasal discharge   Eyes: conjunctivae/corneas clear, PERRL, EOM's intact  Lungs: clear to " auscultation bilaterally, no dullness to percussion bilaterally  Heart: regular rate and rhythm without rub; no displacement of the PMI   Abdomen: soft, non-tender; bowel sounds normoactive; no organomegaly  Extremities: extremities symmetric; no clubbing, cyanosis, or edema  Integument: Skin color, texture, turgor normal; no rashes; hair distrubution normal  Neurologic:   Mental status: alert and awake, oriented x 4, comprehension, naming, and repetition intact. No right to left confusion. Performs serial 7's without difficulty .No dysarthria.  CN 2-12: pupils 4 mm bilaterally, reactive to light. Fundi without papilledema. Visual fields full to confrontation. EOM full without nystagmus. Face sensation normal in all distributions. Face symmetric. Hearing grossly intact. Palate elevates well. Tongue midline without atrophy or fasciculations.  Motor: 5/5 all over  Sensory: intact in all modalities.  DTR's: 2+ all over.  Plantars: no tested.  Coordination: finger to nose and heel-knee-shin intact.  Gait: no ataxia or bradykinesia    LABS:    Complete Blood Count  Lab Results   Component Value Date    RBC 4.70 08/14/2019    HGB 13.0 (L) 08/14/2019    HCT 39.9 (L) 08/14/2019    MCV 85 08/14/2019    MCH 27.7 08/14/2019    MCHC 32.6 08/14/2019    RDW 12.7 08/14/2019     (L) 08/14/2019    MPV 11.9 08/14/2019    GRAN 1.9 08/14/2019    GRAN 43.2 08/14/2019    LYMPH 2.1 08/14/2019    LYMPH 46.2 08/14/2019    MONO 0.4 08/14/2019    MONO 8.9 08/14/2019    EOS 0.1 08/14/2019    BASO 0.02 08/14/2019    EOSINOPHIL 1.3 08/14/2019    BASOPHIL 0.4 08/14/2019    DIFFMETHOD Automated 08/14/2019       Comprehensive Metabolic Panel  Lab Results   Component Value Date    GLU 75 08/14/2019    BUN 8 08/14/2019    CREATININE 0.8 08/14/2019     08/14/2019    K 4.2 08/14/2019     08/14/2019    PROT 6.9 08/14/2019    ALBUMIN 4.3 08/14/2019    BILITOT 0.4 08/14/2019    AST 29 08/14/2019    ALKPHOS 58 08/14/2019    CO2 28  08/14/2019    ALT 45 (H) 08/14/2019    ANIONGAP 6 (L) 08/14/2019    EGFRNONAA >60 08/14/2019    ESTGFRAFRICA >60 08/14/2019       TSH  Lab Results   Component Value Date    TSH 1.324 07/26/2019         Assessment: Healthy 38 y/o with persistent subjective RUE and bilateral face paresthesias but with normal neuro exam and extensive neurological and medical work up that have been consistently unrevealing.  Fortunately, he is reporting significant improvement and thus I will defer further testing at this time.  He is adamant that anxiety has nothing to do with his symptoms, thus a referral to ortho will be the next step if his symptoms do not go away.      No diagnosis found.  There were no encounter diagnoses.      Plan:  1) R UE paresthesias: as above  2) Dizziness: non neurological   3) Anxiety, on Lexapro      No orders of the defined types were placed in this encounter.          Watson Gallego MD

## 2020-04-05 ENCOUNTER — PATIENT MESSAGE (OUTPATIENT)
Dept: INTERNAL MEDICINE | Facility: CLINIC | Age: 40
End: 2020-04-05

## 2020-04-06 ENCOUNTER — PATIENT MESSAGE (OUTPATIENT)
Dept: INTERNAL MEDICINE | Facility: CLINIC | Age: 40
End: 2020-04-06

## 2020-04-06 DIAGNOSIS — R50.9 FEVER, UNSPECIFIED FEVER CAUSE: Primary | ICD-10-CM

## 2020-04-07 RX ORDER — AZITHROMYCIN 250 MG/1
TABLET, FILM COATED ORAL
Qty: 6 TABLET | Refills: 0 | Status: SHIPPED | OUTPATIENT
Start: 2020-04-07 | End: 2020-04-12

## 2020-04-16 ENCOUNTER — PATIENT MESSAGE (OUTPATIENT)
Dept: ADMINISTRATIVE | Facility: HOSPITAL | Age: 40
End: 2020-04-16

## 2020-09-17 ENCOUNTER — OFFICE VISIT (OUTPATIENT)
Dept: INTERNAL MEDICINE | Facility: CLINIC | Age: 40
End: 2020-09-17
Payer: COMMERCIAL

## 2020-09-17 ENCOUNTER — IMMUNIZATION (OUTPATIENT)
Dept: PHARMACY | Facility: CLINIC | Age: 40
End: 2020-09-17
Payer: COMMERCIAL

## 2020-09-17 ENCOUNTER — TELEPHONE (OUTPATIENT)
Dept: NEUROLOGY | Facility: CLINIC | Age: 40
End: 2020-09-17

## 2020-09-17 VITALS
BODY MASS INDEX: 28 KG/M2 | HEIGHT: 70 IN | SYSTOLIC BLOOD PRESSURE: 122 MMHG | DIASTOLIC BLOOD PRESSURE: 80 MMHG | HEART RATE: 70 BPM | OXYGEN SATURATION: 99 % | WEIGHT: 195.56 LBS

## 2020-09-17 DIAGNOSIS — D63.8 ANEMIA OF CHRONIC DISEASE: ICD-10-CM

## 2020-09-17 DIAGNOSIS — L23.7 POISON IVY: ICD-10-CM

## 2020-09-17 DIAGNOSIS — F41.9 ANXIETY: ICD-10-CM

## 2020-09-17 DIAGNOSIS — Z00.00 VISIT FOR ANNUAL HEALTH EXAMINATION: Primary | ICD-10-CM

## 2020-09-17 DIAGNOSIS — E55.9 VITAMIN D DEFICIENCY: ICD-10-CM

## 2020-09-17 DIAGNOSIS — Z13.29 SCREENING FOR THYROID DISORDER: ICD-10-CM

## 2020-09-17 DIAGNOSIS — R20.2 PARESTHESIA: ICD-10-CM

## 2020-09-17 DIAGNOSIS — R29.90 EPISODE OF TRANSIENT NEUROLOGIC SYMPTOMS: ICD-10-CM

## 2020-09-17 DIAGNOSIS — R79.89 ELEVATED LFTS: ICD-10-CM

## 2020-09-17 PROBLEM — E78.5 HYPERLIPIDEMIA: Status: RESOLVED | Noted: 2019-07-27 | Resolved: 2020-09-17

## 2020-09-17 PROCEDURE — 99999 PR PBB SHADOW E&M-EST. PATIENT-LVL V: ICD-10-PCS | Mod: PBBFAC,,, | Performed by: INTERNAL MEDICINE

## 2020-09-17 PROCEDURE — 99395 PREV VISIT EST AGE 18-39: CPT | Mod: S$GLB,,, | Performed by: INTERNAL MEDICINE

## 2020-09-17 PROCEDURE — 99999 PR PBB SHADOW E&M-EST. PATIENT-LVL V: CPT | Mod: PBBFAC,,, | Performed by: INTERNAL MEDICINE

## 2020-09-17 PROCEDURE — 99395 PR PREVENTIVE VISIT,EST,18-39: ICD-10-PCS | Mod: S$GLB,,, | Performed by: INTERNAL MEDICINE

## 2020-09-17 RX ORDER — ALPRAZOLAM 0.25 MG/1
0.25 TABLET ORAL NIGHTLY PRN
Qty: 10 TABLET | Refills: 0 | Status: SHIPPED | OUTPATIENT
Start: 2020-09-17 | End: 2021-10-13

## 2020-09-17 RX ORDER — DULOXETIN HYDROCHLORIDE 20 MG/1
20 CAPSULE, DELAYED RELEASE ORAL DAILY
Qty: 30 CAPSULE | Refills: 3 | Status: SHIPPED | OUTPATIENT
Start: 2020-09-17 | End: 2021-01-13 | Stop reason: SDUPTHER

## 2020-09-17 RX ORDER — TRIAMCINOLONE ACETONIDE 1 MG/G
CREAM TOPICAL 2 TIMES DAILY
Qty: 45 G | Refills: 0 | Status: SHIPPED | OUTPATIENT
Start: 2020-09-17 | End: 2021-10-13

## 2020-09-17 NOTE — PROGRESS NOTES
INTERNAL MEDICINE ESTABLISHED PATIENT VISIT NOTE    Subjective:     Chief Complaint: Numbness (faces and arms) and Anxiety  Annual exam.     Patient ID: Dae Amador is a 40 y.o. male with fatigue, nonspecific neuro sx (particularly B face paresthesias), transaminitis, AoCD, last seen by me in Aug 2019, here today for annual exam.    Has had mult sx of fatigue and various neurologic sx over the past year following acute illness (see previous notes for details) and seen by ID who suspected prolonged viral illness.    Has had extensive w/u including cbc, cmp, hiv, hep panel, DM screening, TFTs, infectious w/u, etc which was overall unremarkable x transaminitis which was close to normal on last check and mild aocd.    Had also been seen by Neuro c neg stroke w/u done several times (initially thought possible TIA) and was treated for possible Lyme disease c Doxycycline for 21 days c improvement in sx.     In Dec was started on Lexapro for possibly underlying anxiety triggering his physical sx but felt meds did not help at all c his sx.  Took meds for about a month and then stopped it but says he resumed meds about a week ago for recurrent sx and increased stress and thinks it is helping a little.    Was seen by Neuro over the past year (Dr. Gallego) who rec EMG/NCV and repeated MRI brain and MRA neck which again was unremarkable.    Today states he had another similar episode recently on a drive home from [place].  Was a passenger and states she started to feel facial numbness and B arm weakness and just took a nap and when he woke up he felt better.    Still c mild intermittent numbness and tingling on face and B arms.  No LE sx.     Does not feel outwardly anxious but unsure if sx worse when he is incidentally under more stress.    Today also c/o recent exposure to poison ivy and has a rash on his arms and groin area (used the bathroom shortly after exposure).    Past Medical History:  History reviewed. No pertinent  past medical history.    Home Medications:  Prior to Admission medications    Medication Sig Start Date End Date Taking? Authorizing Provider   ALPRAZolam (XANAX) 0.25 MG tablet Take 1 tablet (0.25 mg total) by mouth 2 (two) times daily as needed for Anxiety. 7/31/19 8/30/19  Marielena Mcfarlane MD   aspirin (ECOTRIN) 81 MG EC tablet Take 1 tablet (81 mg total) by mouth once daily. 7/28/19 7/27/20  Sebastián Deluna NP   atorvastatin (LIPITOR) 20 MG tablet Take 1 tablet (20 mg total) by mouth once daily.  Patient not taking: Reported on 1/27/2020 7/28/19 7/27/20  Sebastián Deluna NP   baclofen (LIORESAL) 10 MG tablet Take 0.5 tablets (5 mg total) by mouth every evening.  Patient not taking: Reported on 1/27/2020 8/1/19 7/31/20  Karolyn Day MD   doxycycline (VIBRAMYCIN) 100 MG Cap TK ONE C PO BID TAT 8/1/19   Historical Provider   escitalopram oxalate (LEXAPRO) 10 MG tablet Take 0.5 tablets (5 mg total) by mouth every evening for 7 days, THEN 1 tablet (10 mg total) every evening.  Patient not taking: Reported on 2/19/2020 12/3/19 3/9/20  Miladis Sequeira MD   ondansetron (ZOFRAN) 4 MG tablet Take 1 tablet (4 mg total) by mouth every 8 (eight) hours as needed for Nausea.  Patient not taking: Reported on 1/27/2020 7/15/19   Miladis Sequeira MD       Allergies:  Review of patient's allergies indicates:  No Known Allergies    Social History:  Social History     Tobacco Use    Smoking status: Never Smoker    Smokeless tobacco: Never Used   Substance Use Topics    Alcohol use: Yes    Drug use: Never        Review of Systems   Constitutional: Negative for appetite change, chills, fatigue, fever and unexpected weight change.   HENT: Negative for congestion, hearing loss and rhinorrhea.    Eyes: Negative for pain and visual disturbance.   Respiratory: Negative for cough, chest tightness, shortness of breath and wheezing.    Cardiovascular: Negative for chest pain, palpitations and leg swelling.   Gastrointestinal: Negative for  "abdominal distention and abdominal pain.   Endocrine: Negative for polydipsia and polyuria.   Genitourinary: Negative for decreased urine volume, discharge, dysuria and frequency.   Neurological: Positive for numbness. Negative for dizziness, weakness and headaches.   Psychiatric/Behavioral: Negative for behavioral problems and confusion.         Health Maintenance:     Immunizations:   Influenza rec today  TDap 2017 at [facility]  Prevnar 13 rec at 66  Shingrix rec at 51     Cancer Screening:  Colonoscopy: rec at 51    Objective:   /80 (BP Location: Right arm, Patient Position: Sitting, BP Method: Medium (Manual))   Pulse 70   Ht 5' 10" (1.778 m)   Wt 88.7 kg (195 lb 8.8 oz)   SpO2 99%   BMI 28.06 kg/m²        General: AAO x3, no apparent distress  HEENT: PERRL, OP clear  CV: RRR, no m/r/g  Pulm: Lungs CTAB, no crackles, no wheezes  Abd: s/NT/ND +BS  Extremities: no c/c/e  Skin: weeping pustules on L forearm c some areas of raised erythema on forearms and groin      Labs:     Lab Results   Component Value Date    WBC 4.48 08/14/2019    HGB 13.0 (L) 08/14/2019    HCT 39.9 (L) 08/14/2019    MCV 85 08/14/2019     (L) 08/14/2019     Sodium   Date Value Ref Range Status   08/14/2019 139 136 - 145 mmol/L Final     Potassium   Date Value Ref Range Status   08/14/2019 4.2 3.5 - 5.1 mmol/L Final     Chloride   Date Value Ref Range Status   08/14/2019 105 95 - 110 mmol/L Final     CO2   Date Value Ref Range Status   08/14/2019 28 23 - 29 mmol/L Final     Glucose   Date Value Ref Range Status   08/14/2019 75 70 - 110 mg/dL Final     BUN, Bld   Date Value Ref Range Status   08/14/2019 8 6 - 20 mg/dL Final     Creatinine   Date Value Ref Range Status   08/14/2019 0.8 0.5 - 1.4 mg/dL Final     Calcium   Date Value Ref Range Status   08/14/2019 9.2 8.7 - 10.5 mg/dL Final     Total Protein   Date Value Ref Range Status   08/14/2019 6.9 6.0 - 8.4 g/dL Final     Albumin   Date Value Ref Range Status   08/14/2019 4.3 " 3.5 - 5.2 g/dL Final     Total Bilirubin   Date Value Ref Range Status   08/14/2019 0.4 0.1 - 1.0 mg/dL Final     Comment:     For infants and newborns, interpretation of results should be based  on gestational age, weight and in agreement with clinical  observations.  Premature Infant recommended reference ranges:  Up to 24 hours.............<8.0 mg/dL  Up to 48 hours............<12.0 mg/dL  3-5 days..................<15.0 mg/dL  6-29 days.................<15.0 mg/dL       Alkaline Phosphatase   Date Value Ref Range Status   08/14/2019 58 55 - 135 U/L Final     AST   Date Value Ref Range Status   08/14/2019 29 10 - 40 U/L Final     ALT   Date Value Ref Range Status   08/14/2019 45 (H) 10 - 44 U/L Final     Anion Gap   Date Value Ref Range Status   08/14/2019 6 (L) 8 - 16 mmol/L Final     eGFR if    Date Value Ref Range Status   08/14/2019 >60 >60 mL/min/1.73 m^2 Final     eGFR if non    Date Value Ref Range Status   08/14/2019 >60 >60 mL/min/1.73 m^2 Final     Comment:     Calculation used to obtain the estimated glomerular filtration  rate (eGFR) is the CKD-EPI equation.        Lab Results   Component Value Date    HGBA1C 5.4 07/26/2019     Lab Results   Component Value Date    LDLCALC 75.4 07/26/2019     Lab Results   Component Value Date    TSH 1.324 07/26/2019         Assessment/Plan     Dae was seen today for numbness and anxiety.    Diagnoses and all orders for this visit:    Visit for annual health examination  History and physical exam completed.  Health maintenance reviewed as above.  -     Comprehensive metabolic panel; Future  -     Hemoglobin A1C; Future  -     Lipid Panel; Future  -     Ambulatory referral/consult to Dermatology; Future    Paresthesia  Episode of transient neurologic symptoms  As per HPI  Unclear etiology, intermittent sx of facial numbness and B arm weakness.  Will set up f/u c Neuro since sx recurrent.  Will also tx underlying anxiety sx.  -      Ambulatory referral/consult to Neurology; Future    Anxiety  Will try changing lexapro to cymbalta to see if this helps c his sx  Ok to fill prn xanax, pt taking sparingly.  -     ALPRAZolam (XANAX) 0.25 MG tablet; Take 1 tablet (0.25 mg total) by mouth nightly as needed for Anxiety.  -     DULoxetine (CYMBALTA) 20 MG capsule; Take 1 capsule (20 mg total) by mouth once daily.    Elevated LFTs  Close to normal on f/u labs, hep panel pending,   CT abd done inpatient unremarkable previously.  Drinks EtOH (bourbon), will repeat labs now, discussed moderation    Poison ivy  As above, will give topical steroid, consider oral steroids if sx fail to improve.  -     triamcinolone acetonide 0.1% (KENALOG) 0.1 % cream; Apply topically 2 (two) times daily.    Anemia of chronic disease  Mild, stable, will monitor  -     CBC auto differential; Future    Vitamin D deficiency  Takes MVI intermittently  Will repeat labs now.  -     Vitamin D; Future    Screening for thyroid disorder  -     TSH; Future    HM as above  RTC in 4 mos for f/u, sooner if needed.  Fasting labs on Monday.    Miladis Sequeira MD  Department of Internal Medicine - [facility]  09/17/2020

## 2020-09-21 ENCOUNTER — LAB VISIT (OUTPATIENT)
Dept: LAB | Facility: HOSPITAL | Age: 40
End: 2020-09-21
Attending: INTERNAL MEDICINE
Payer: COMMERCIAL

## 2020-09-21 DIAGNOSIS — D63.8 ANEMIA OF CHRONIC DISEASE: ICD-10-CM

## 2020-09-21 DIAGNOSIS — E55.9 VITAMIN D DEFICIENCY: ICD-10-CM

## 2020-09-21 DIAGNOSIS — Z00.00 VISIT FOR ANNUAL HEALTH EXAMINATION: ICD-10-CM

## 2020-09-21 DIAGNOSIS — Z13.29 SCREENING FOR THYROID DISORDER: ICD-10-CM

## 2020-09-21 LAB
25(OH)D3+25(OH)D2 SERPL-MCNC: 21 NG/ML (ref 30–96)
ALBUMIN SERPL BCP-MCNC: 4.2 G/DL (ref 3.5–5.2)
ALP SERPL-CCNC: 74 U/L (ref 55–135)
ALT SERPL W/O P-5'-P-CCNC: 22 U/L (ref 10–44)
ANION GAP SERPL CALC-SCNC: 9 MMOL/L (ref 8–16)
AST SERPL-CCNC: 20 U/L (ref 10–40)
BASOPHILS # BLD AUTO: 0.01 K/UL (ref 0–0.2)
BASOPHILS NFR BLD: 0.1 % (ref 0–1.9)
BILIRUB SERPL-MCNC: 0.3 MG/DL (ref 0.1–1)
BUN SERPL-MCNC: 10 MG/DL (ref 6–20)
CALCIUM SERPL-MCNC: 9.1 MG/DL (ref 8.7–10.5)
CHLORIDE SERPL-SCNC: 106 MMOL/L (ref 95–110)
CHOLEST SERPL-MCNC: 189 MG/DL (ref 120–199)
CHOLEST/HDLC SERPL: 3.4 {RATIO} (ref 2–5)
CO2 SERPL-SCNC: 26 MMOL/L (ref 23–29)
CREAT SERPL-MCNC: 0.9 MG/DL (ref 0.5–1.4)
DIFFERENTIAL METHOD: ABNORMAL
EOSINOPHIL # BLD AUTO: 0 K/UL (ref 0–0.5)
EOSINOPHIL NFR BLD: 0 % (ref 0–8)
ERYTHROCYTE [DISTWIDTH] IN BLOOD BY AUTOMATED COUNT: 12.2 % (ref 11.5–14.5)
EST. GFR  (AFRICAN AMERICAN): >60 ML/MIN/1.73 M^2
EST. GFR  (NON AFRICAN AMERICAN): >60 ML/MIN/1.73 M^2
ESTIMATED AVG GLUCOSE: 108 MG/DL (ref 68–131)
GLUCOSE SERPL-MCNC: 114 MG/DL (ref 70–110)
HBA1C MFR BLD HPLC: 5.4 % (ref 4–5.6)
HCT VFR BLD AUTO: 40.8 % (ref 40–54)
HDLC SERPL-MCNC: 55 MG/DL (ref 40–75)
HDLC SERPL: 29.1 % (ref 20–50)
HGB BLD-MCNC: 13.8 G/DL (ref 14–18)
IMM GRANULOCYTES # BLD AUTO: 0.03 K/UL (ref 0–0.04)
IMM GRANULOCYTES NFR BLD AUTO: 0.4 % (ref 0–0.5)
LDLC SERPL CALC-MCNC: 120.6 MG/DL (ref 63–159)
LYMPHOCYTES # BLD AUTO: 1.2 K/UL (ref 1–4.8)
LYMPHOCYTES NFR BLD: 15.3 % (ref 18–48)
MCH RBC QN AUTO: 29 PG (ref 27–31)
MCHC RBC AUTO-ENTMCNC: 33.8 G/DL (ref 32–36)
MCV RBC AUTO: 86 FL (ref 82–98)
MONOCYTES # BLD AUTO: 0.4 K/UL (ref 0.3–1)
MONOCYTES NFR BLD: 5.6 % (ref 4–15)
NEUTROPHILS # BLD AUTO: 6.2 K/UL (ref 1.8–7.7)
NEUTROPHILS NFR BLD: 78.6 % (ref 38–73)
NONHDLC SERPL-MCNC: 134 MG/DL
NRBC BLD-RTO: 0 /100 WBC
PLATELET # BLD AUTO: 162 K/UL (ref 150–350)
PMV BLD AUTO: 11.8 FL (ref 9.2–12.9)
POTASSIUM SERPL-SCNC: 4.2 MMOL/L (ref 3.5–5.1)
PROT SERPL-MCNC: 7.1 G/DL (ref 6–8.4)
RBC # BLD AUTO: 4.76 M/UL (ref 4.6–6.2)
SODIUM SERPL-SCNC: 141 MMOL/L (ref 136–145)
TRIGL SERPL-MCNC: 67 MG/DL (ref 30–150)
TSH SERPL DL<=0.005 MIU/L-ACNC: 0.78 UIU/ML (ref 0.4–4)
WBC # BLD AUTO: 7.86 K/UL (ref 3.9–12.7)

## 2020-09-21 PROCEDURE — 82306 VITAMIN D 25 HYDROXY: CPT

## 2020-09-21 PROCEDURE — 36415 COLL VENOUS BLD VENIPUNCTURE: CPT

## 2020-09-21 PROCEDURE — 85025 COMPLETE CBC W/AUTO DIFF WBC: CPT

## 2020-09-21 PROCEDURE — 80061 LIPID PANEL: CPT

## 2020-09-21 PROCEDURE — 84443 ASSAY THYROID STIM HORMONE: CPT

## 2020-09-21 PROCEDURE — 80053 COMPREHEN METABOLIC PANEL: CPT

## 2020-09-21 PROCEDURE — 83036 HEMOGLOBIN GLYCOSYLATED A1C: CPT

## 2020-10-06 ENCOUNTER — OFFICE VISIT (OUTPATIENT)
Dept: NEUROLOGY | Facility: CLINIC | Age: 40
End: 2020-10-06
Payer: COMMERCIAL

## 2020-10-06 DIAGNOSIS — R20.2 PARESTHESIA: ICD-10-CM

## 2020-10-06 PROCEDURE — 99214 OFFICE O/P EST MOD 30 MIN: CPT | Mod: 95,,, | Performed by: PSYCHIATRY & NEUROLOGY

## 2020-10-06 PROCEDURE — 99214 PR OFFICE/OUTPT VISIT, EST, LEVL IV, 30-39 MIN: ICD-10-PCS | Mod: 95,,, | Performed by: PSYCHIATRY & NEUROLOGY

## 2020-10-06 NOTE — LETTER
October 7, 2020      Miladis Sequeira MD  [address]  [facility] [address]           [facility] - Neurology 7th Fl  [address]  [facility] [address]  Phone: [phone]          Patient: Dae Amador   MR Number: [id number]   YOB: 1980   Date of Visit: 10/6/2020       Dear Dr. Washburn:    Thank you for referring Dae Amador to me for evaluation. Attached you will find relevant portions of my assessment and plan of care.    If you have questions, please do not hesitate to call me. I look forward to following Dae Amador along with you.    Sincerely,    Watson Gallego MD    Enclosure  CC:  No Recipients    If you would like to receive this communication electronically, please contact [email or web address] or [phone] to request more information on [employer] access.    For providers and/or their staff who would like to refer a patient to [facility], please contact us through our one-stop-shop provider referral line, [facility] , at [phone].    If you feel you have received this communication in error or would no longer like to receive these types of communications, please e-mail [email or web address]

## 2020-10-07 NOTE — PROGRESS NOTES
The patient location is: home  The chief complaint leading to consultation is: bilateral face numbness    Visit type: audiovisual    Face to Face time with patient: 30 minutes of total time spent on the encounter, which includes face to face time and non-face to face time preparing to see the patient (eg, review of tests), Obtaining and/or reviewing separately obtained history, Documenting clinical information in the electronic or other health record, Independently interpreting results (not separately reported) and communicating results to the patient/family/caregiver, or Care coordination (not separately reported).     Each patient to whom he or she provides medical services by telemedicine is:  (1) informed of the relationship between the physician and patient and the respective role of any other health care provider with respect to management of the patient; and (2) notified that he or she may decline to receive medical services by telemedicine and may withdraw from such care at any time.    HPI:  I saw Mr Amador in a virtual visit today.  He is a pleasant 39 y/o essentially healthy R handed male who I have been following in the office due to recurrent paresthesias involving the whole face-and R UE as well as dizziness. Importantly, he had had extensive but unrevealing neurological testing.  Stated that he was doing reasonably well until recently when on a drive home from [place] he started to feel facial numbness and B arm weakness and just took a nap and when he woke up he felt better. Said that ever since hen has been having daily, mild,  intermittent numbness and tingling on face and B arms that is not associated with other neurological or cardiac symptoms. Incidentally, he has noted that these symptoms become more evident when he is under more stress, especially early in the morning when he is getting ready to go to work.  Mr Amador indicated that he was switched from Lexapro to Cymbalta and his symptoms  are much better now.    Physical exam including vitals signs and Neurological examination were not feasible during this virtual visit.    Assessment and Plan: Healthy 41 y/o with recurrent episodes of bilateral face paresthesias and extensive but unrevealing neurological work up.  Of note, his symptoms become more noticeable under stressful situations and current use of low dose Cymbalta has resulted on significant improvement, thus I wonder if stress/anxiety could be the provoking factors for his symptoms.  Will defer neurological testing at this time and see how he does on Cymbalta.  I asked Mr Amador to call me with any questions, concerns, or new neurological developments.      .Watson Gallego MD   Vascular Neurology  [facility]  [facility]

## 2020-11-04 ENCOUNTER — OFFICE VISIT (OUTPATIENT)
Dept: DERMATOLOGY | Facility: CLINIC | Age: 40
End: 2020-11-04
Payer: COMMERCIAL

## 2020-11-04 DIAGNOSIS — L82.1 SK (SEBORRHEIC KERATOSIS): ICD-10-CM

## 2020-11-04 DIAGNOSIS — L71.9 ROSACEA: Primary | ICD-10-CM

## 2020-11-04 DIAGNOSIS — Z12.83 SKIN CANCER SCREENING: ICD-10-CM

## 2020-11-04 DIAGNOSIS — D22.9 MULTIPLE BENIGN NEVI: ICD-10-CM

## 2020-11-04 DIAGNOSIS — Z00.00 VISIT FOR ANNUAL HEALTH EXAMINATION: ICD-10-CM

## 2020-11-04 PROCEDURE — 99999 PR PBB SHADOW E&M-EST. PATIENT-LVL III: CPT | Mod: PBBFAC,,, | Performed by: DERMATOLOGY

## 2020-11-04 PROCEDURE — 99203 OFFICE O/P NEW LOW 30 MIN: CPT | Mod: S$GLB,,, | Performed by: DERMATOLOGY

## 2020-11-04 PROCEDURE — 99203 PR OFFICE/OUTPT VISIT, NEW, LEVL III, 30-44 MIN: ICD-10-PCS | Mod: S$GLB,,, | Performed by: DERMATOLOGY

## 2020-11-04 PROCEDURE — 99999 PR PBB SHADOW E&M-EST. PATIENT-LVL III: ICD-10-PCS | Mod: PBBFAC,,, | Performed by: DERMATOLOGY

## 2020-11-04 RX ORDER — METRONIDAZOLE 7.5 MG/G
CREAM TOPICAL
Qty: 45 G | Refills: 3 | Status: SHIPPED | OUTPATIENT
Start: 2020-11-04 | End: 2021-10-13

## 2020-11-04 NOTE — PROGRESS NOTES
"  Subjective:       Patient ID:  Dae Amador is a 40 y.o. male who presents for   Chief Complaint   Patient presents with    Skin Check     Pt presents today for TBSE     HPI     Patient is here today for a "mole" check.   Pt has a history of  moderate sun exposure in the past.   Pt recalls several blistering sunburns in the past- no  Pt has history of tanning bed use- no  Pt has  had moles removed in the past- no  Pt has history of melanoma in first degree relatives-  Yes MGF    Has a couple moles on back (present many yrs) and L groin (present x last few yrs, changed color, became more raised) that he would like checked. Asymptomatic and no prior treatment.  Also c/o occasional pimple-type bumps on nose, on and off x yrs. Asymptomatic and no prior treatment.    Review of Systems   Constitutional: Negative for fever, chills, weight loss, weight gain, fatigue, night sweats and malaise.   Respiratory: Negative for cough and shortness of breath.    Skin: Negative for itching, daily sunscreen use and activity-related sunscreen use.   Hematologic/Lymphatic: Does not bruise/bleed easily.        Objective:    Physical Exam   Constitutional: He appears well-developed and well-nourished. No distress.   Genitourinary:         Neurological: He is alert and oriented to person, place, and time. He is not disoriented.   Psychiatric: He has a normal mood and affect.   Skin:   Areas Examined (abnormalities noted in diagram):   Scalp / Hair Palpated and Inspected  Head / Face Inspection Performed  Neck Inspection Performed  Chest / Axilla Inspection Performed  Abdomen Inspection Performed  Genitals / Buttocks / Groin Inspection Performed  Back Inspection Performed  RUE Inspected  LUE Inspection Performed  RLE Inspected  LLE Inspection Performed  Nails and Digits Inspection Performed                       Diagram Legend     Erythematous scaling macule/papule c/w actinic keratosis       Vascular papule c/w angioma      Pigmented " verrucoid papule/plaque c/w seborrheic keratosis      Yellow umbilicated papule c/w sebaceous hyperplasia      Irregularly shaped tan macule c/w lentigo     1-2 mm smooth white papules consistent with Milia      Movable subcutaneous cyst with punctum c/w epidermal inclusion cyst      Subcutaneous movable cyst c/w pilar cyst      Firm pink to brown papule c/w dermatofibroma      Pedunculated fleshy papule(s) c/w skin tag(s)      Evenly pigmented macule c/w junctional nevus     Mildly variegated pigmented, slightly irregular-bordered macule c/w mildly atypical nevus      Flesh colored to evenly pigmented papule c/w intradermal nevus       Pink pearly papule/plaque c/w basal cell carcinoma      Erythematous hyperkeratotic cursted plaque c/w SCC      Surgical scar with no sign of skin cancer recurrence      Open and closed comedones      Inflammatory papules and pustules      Verrucoid papule consistent consistent with wart     Erythematous eczematous patches and plaques     Dystrophic onycholytic nail with subungual debris c/w onychomycosis     Umbilicated papule    Erythematous-base heme-crusted tan verrucoid plaque consistent with inflamed seborrheic keratosis     Erythematous Silvery Scaling Plaque c/w Psoriasis     See annotation      Assessment / Plan:        Rosacea  -     metronidazole 0.75% (METROCREAM) 0.75 % Crea; AAA on face BID  Dispense: 45 g; Refill: 3  Patient instructed in importance of daily broad spectrum sunscreen use with spf at least 30. Sun avoidance and topical protection/protective clothing discussed.    Multiple benign nevi  Benign-appearing on exam today. Counseled pt to monitor mole(s) and return to clinic if any changes noted or symptoms (bleeding, itching, pain, etc) noted. Brochure provided.    SK (seborrheic keratosis)  These are benign inherited growths without a malignant potential. Reassurance given to patient. No treatment is necessary.   Treatment of benign, asymptomatic lesions may  be considered cosmetic.  Warned about risk of hypo- or hyperpigmentation with treatment and risk of recurrence.    Skin cancer screening  Total body skin examination performed today including at least 12 points as noted in physical examination. No lesions suspicious for malignancy noted.  Patient instructed in importance of daily broad spectrum sunscreen use with spf at least 30. Sun avoidance and topical protection/protective clothing discussed.      Follow up in about 1 year (around 11/4/2021) for skin check or sooner for any concerns.

## 2020-11-04 NOTE — PATIENT INSTRUCTIONS

## 2020-11-04 NOTE — LETTER
November 8, 2020      Miladis Sequeira MD  [address]  [facility] [address]           [facility] - Dermatology 11th Fl  [address]  [facility] [address]  Phone: [phone]  Fax: [phone]          Patient: Dae Amdaor   MR Number: [id number]   YOB: 1980   Date of Visit: 11/4/2020       Dear Dr. Washburn:    Thank you for referring Dae Amador to me for evaluation. Attached you will find relevant portions of my assessment and plan of care.    If you have questions, please do not hesitate to call me. I look forward to following Dae Amador along with you.    Sincerely,    Cony Aguilera MD    Enclosure  CC:  No Recipients    If you would like to receive this communication electronically, please contact [email or web address] or [phone] to request more information on [employer] access.    For providers and/or their staff who would like to refer a patient to [facility], please contact us through our one-stop-shop provider referral line, [facility], at [phone].    If you feel you have received this communication in error or would no longer like to receive these types of communications, please e-mail [email or web address]

## 2021-01-13 ENCOUNTER — PATIENT MESSAGE (OUTPATIENT)
Dept: INTERNAL MEDICINE | Facility: CLINIC | Age: 41
End: 2021-01-13

## 2021-01-13 DIAGNOSIS — F41.9 ANXIETY: ICD-10-CM

## 2021-01-13 RX ORDER — DULOXETIN HYDROCHLORIDE 20 MG/1
20 CAPSULE, DELAYED RELEASE ORAL DAILY
Qty: 30 CAPSULE | Refills: 3 | Status: SHIPPED | OUTPATIENT
Start: 2021-01-13 | End: 2021-10-13

## 2021-04-16 ENCOUNTER — PATIENT MESSAGE (OUTPATIENT)
Dept: RESEARCH | Facility: HOSPITAL | Age: 41
End: 2021-04-16

## 2021-05-20 ENCOUNTER — OFFICE VISIT (OUTPATIENT)
Dept: UROLOGY | Facility: CLINIC | Age: 41
End: 2021-05-20
Payer: COMMERCIAL

## 2021-05-20 VITALS
SYSTOLIC BLOOD PRESSURE: 125 MMHG | WEIGHT: 206.81 LBS | DIASTOLIC BLOOD PRESSURE: 78 MMHG | HEART RATE: 80 BPM | HEIGHT: 70 IN | BODY MASS INDEX: 29.61 KG/M2

## 2021-05-20 DIAGNOSIS — Z30.2 ENCOUNTER FOR MALE STERILIZATION PROCEDURE: Primary | ICD-10-CM

## 2021-05-20 PROCEDURE — 99999 PR PBB SHADOW E&M-EST. PATIENT-LVL III: ICD-10-PCS | Mod: PBBFAC,,, | Performed by: UROLOGY

## 2021-05-20 PROCEDURE — 99204 PR OFFICE/OUTPT VISIT, NEW, LEVL IV, 45-59 MIN: ICD-10-PCS | Mod: S$GLB,,, | Performed by: UROLOGY

## 2021-05-20 PROCEDURE — 99204 OFFICE O/P NEW MOD 45 MIN: CPT | Mod: S$GLB,,, | Performed by: UROLOGY

## 2021-05-20 PROCEDURE — 99999 PR PBB SHADOW E&M-EST. PATIENT-LVL III: CPT | Mod: PBBFAC,,, | Performed by: UROLOGY

## 2021-05-20 RX ORDER — LIDOCAINE HYDROCHLORIDE 10 MG/ML
20 INJECTION INFILTRATION; PERINEURAL ONCE
Status: COMPLETED | OUTPATIENT
Start: 2021-06-19 | End: 2021-07-16

## 2021-06-08 ENCOUNTER — TELEPHONE (OUTPATIENT)
Dept: UROLOGY | Facility: CLINIC | Age: 41
End: 2021-06-08

## 2021-07-16 ENCOUNTER — PROCEDURE VISIT (OUTPATIENT)
Dept: UROLOGY | Facility: CLINIC | Age: 41
End: 2021-07-16
Payer: COMMERCIAL

## 2021-07-16 VITALS
RESPIRATION RATE: 15 BRPM | BODY MASS INDEX: 28.56 KG/M2 | WEIGHT: 199.5 LBS | HEART RATE: 71 BPM | HEIGHT: 70 IN | DIASTOLIC BLOOD PRESSURE: 74 MMHG | TEMPERATURE: 98 F | SYSTOLIC BLOOD PRESSURE: 127 MMHG

## 2021-07-16 DIAGNOSIS — Z30.2 ENCOUNTER FOR MALE STERILIZATION PROCEDURE: ICD-10-CM

## 2021-07-16 PROCEDURE — 55250 PR REMOVAL OF SPERM DUCT(S): ICD-10-PCS | Mod: S$GLB,,, | Performed by: UROLOGY

## 2021-07-16 PROCEDURE — 55250 REMOVAL OF SPERM DUCT(S): CPT | Mod: S$GLB,,, | Performed by: UROLOGY

## 2021-07-16 RX ORDER — CEPHALEXIN 500 MG/1
500 CAPSULE ORAL 4 TIMES DAILY
Qty: 8 CAPSULE | Refills: 0 | Status: SHIPPED | OUTPATIENT
Start: 2021-07-16 | End: 2021-07-18

## 2021-07-16 RX ORDER — OXYCODONE AND ACETAMINOPHEN 5; 325 MG/1; MG/1
1 TABLET ORAL EVERY 4 HOURS PRN
Qty: 8 TABLET | Refills: 0 | Status: SHIPPED | OUTPATIENT
Start: 2021-07-16 | End: 2021-07-26

## 2021-07-16 RX ADMIN — LIDOCAINE HYDROCHLORIDE 20 ML: 10 INJECTION INFILTRATION; PERINEURAL at 09:07

## 2021-07-31 ENCOUNTER — PATIENT MESSAGE (OUTPATIENT)
Dept: UROLOGY | Facility: CLINIC | Age: 41
End: 2021-07-31

## 2021-08-13 ENCOUNTER — PATIENT MESSAGE (OUTPATIENT)
Dept: INTERNAL MEDICINE | Facility: CLINIC | Age: 41
End: 2021-08-13

## 2021-09-28 ENCOUNTER — TELEPHONE (OUTPATIENT)
Dept: UROLOGY | Facility: CLINIC | Age: 41
End: 2021-09-28

## 2021-10-04 ENCOUNTER — PATIENT MESSAGE (OUTPATIENT)
Dept: ADMINISTRATIVE | Facility: HOSPITAL | Age: 41
End: 2021-10-04

## 2021-10-06 ENCOUNTER — TELEPHONE (OUTPATIENT)
Dept: UROLOGY | Facility: CLINIC | Age: 41
End: 2021-10-06

## 2021-10-13 ENCOUNTER — OFFICE VISIT (OUTPATIENT)
Dept: INTERNAL MEDICINE | Facility: CLINIC | Age: 41
End: 2021-10-13
Payer: COMMERCIAL

## 2021-10-13 ENCOUNTER — LAB VISIT (OUTPATIENT)
Dept: LAB | Facility: HOSPITAL | Age: 41
End: 2021-10-13
Attending: INTERNAL MEDICINE
Payer: COMMERCIAL

## 2021-10-13 ENCOUNTER — IMMUNIZATION (OUTPATIENT)
Dept: INTERNAL MEDICINE | Facility: CLINIC | Age: 41
End: 2021-10-13
Payer: COMMERCIAL

## 2021-10-13 VITALS
BODY MASS INDEX: 29.2 KG/M2 | HEART RATE: 65 BPM | OXYGEN SATURATION: 97 % | HEIGHT: 70 IN | SYSTOLIC BLOOD PRESSURE: 108 MMHG | WEIGHT: 203.94 LBS | DIASTOLIC BLOOD PRESSURE: 72 MMHG

## 2021-10-13 DIAGNOSIS — F41.9 ANXIETY: ICD-10-CM

## 2021-10-13 DIAGNOSIS — H00.011 HORDEOLUM EXTERNUM OF RIGHT UPPER EYELID: ICD-10-CM

## 2021-10-13 DIAGNOSIS — Z11.59 NEED FOR HEPATITIS C SCREENING TEST: ICD-10-CM

## 2021-10-13 DIAGNOSIS — Z00.00 VISIT FOR ANNUAL HEALTH EXAMINATION: Primary | ICD-10-CM

## 2021-10-13 DIAGNOSIS — R29.90 EPISODE OF TRANSIENT NEUROLOGIC SYMPTOMS: ICD-10-CM

## 2021-10-13 DIAGNOSIS — E55.9 VITAMIN D DEFICIENCY: ICD-10-CM

## 2021-10-13 DIAGNOSIS — R79.89 ELEVATED LFTS: ICD-10-CM

## 2021-10-13 DIAGNOSIS — Z00.00 VISIT FOR ANNUAL HEALTH EXAMINATION: ICD-10-CM

## 2021-10-13 DIAGNOSIS — D63.8 ANEMIA OF CHRONIC DISEASE: ICD-10-CM

## 2021-10-13 DIAGNOSIS — R20.2 PARESTHESIA: ICD-10-CM

## 2021-10-13 DIAGNOSIS — Z13.29 SCREENING FOR THYROID DISORDER: ICD-10-CM

## 2021-10-13 DIAGNOSIS — R73.01 IMPAIRED FASTING GLUCOSE: ICD-10-CM

## 2021-10-13 LAB
25(OH)D3+25(OH)D2 SERPL-MCNC: 27 NG/ML (ref 30–96)
ALBUMIN SERPL BCP-MCNC: 4.3 G/DL (ref 3.5–5.2)
ALP SERPL-CCNC: 72 U/L (ref 55–135)
ALT SERPL W/O P-5'-P-CCNC: 29 U/L (ref 10–44)
ANION GAP SERPL CALC-SCNC: 11 MMOL/L (ref 8–16)
AST SERPL-CCNC: 27 U/L (ref 10–40)
BASOPHILS # BLD AUTO: 0.01 K/UL (ref 0–0.2)
BASOPHILS NFR BLD: 0.2 % (ref 0–1.9)
BILIRUB SERPL-MCNC: 0.6 MG/DL (ref 0.1–1)
BUN SERPL-MCNC: 12 MG/DL (ref 6–20)
CALCIUM SERPL-MCNC: 10 MG/DL (ref 8.7–10.5)
CHLORIDE SERPL-SCNC: 104 MMOL/L (ref 95–110)
CHOLEST SERPL-MCNC: 214 MG/DL (ref 120–199)
CHOLEST/HDLC SERPL: 4.4 {RATIO} (ref 2–5)
CO2 SERPL-SCNC: 24 MMOL/L (ref 23–29)
CREAT SERPL-MCNC: 0.8 MG/DL (ref 0.5–1.4)
DIFFERENTIAL METHOD: ABNORMAL
EOSINOPHIL # BLD AUTO: 0 K/UL (ref 0–0.5)
EOSINOPHIL NFR BLD: 0.8 % (ref 0–8)
ERYTHROCYTE [DISTWIDTH] IN BLOOD BY AUTOMATED COUNT: 12.3 % (ref 11.5–14.5)
EST. GFR  (AFRICAN AMERICAN): >60 ML/MIN/1.73 M^2
EST. GFR  (NON AFRICAN AMERICAN): >60 ML/MIN/1.73 M^2
ESTIMATED AVG GLUCOSE: 103 MG/DL (ref 68–131)
GLUCOSE SERPL-MCNC: 95 MG/DL (ref 70–110)
HBA1C MFR BLD: 5.2 % (ref 4–5.6)
HCT VFR BLD AUTO: 40.3 % (ref 40–54)
HDLC SERPL-MCNC: 49 MG/DL (ref 40–75)
HDLC SERPL: 22.9 % (ref 20–50)
HGB BLD-MCNC: 13.4 G/DL (ref 14–18)
IMM GRANULOCYTES # BLD AUTO: 0.01 K/UL (ref 0–0.04)
IMM GRANULOCYTES NFR BLD AUTO: 0.2 % (ref 0–0.5)
LDLC SERPL CALC-MCNC: 143.2 MG/DL (ref 63–159)
LYMPHOCYTES # BLD AUTO: 1.9 K/UL (ref 1–4.8)
LYMPHOCYTES NFR BLD: 38.3 % (ref 18–48)
MCH RBC QN AUTO: 28.3 PG (ref 27–31)
MCHC RBC AUTO-ENTMCNC: 33.3 G/DL (ref 32–36)
MCV RBC AUTO: 85 FL (ref 82–98)
MONOCYTES # BLD AUTO: 0.3 K/UL (ref 0.3–1)
MONOCYTES NFR BLD: 7 % (ref 4–15)
NEUTROPHILS # BLD AUTO: 2.6 K/UL (ref 1.8–7.7)
NEUTROPHILS NFR BLD: 53.5 % (ref 38–73)
NONHDLC SERPL-MCNC: 165 MG/DL
NRBC BLD-RTO: 0 /100 WBC
PLATELET # BLD AUTO: 168 K/UL (ref 150–450)
PMV BLD AUTO: 12.4 FL (ref 9.2–12.9)
POTASSIUM SERPL-SCNC: 4.2 MMOL/L (ref 3.5–5.1)
PROT SERPL-MCNC: 7.3 G/DL (ref 6–8.4)
RBC # BLD AUTO: 4.73 M/UL (ref 4.6–6.2)
SODIUM SERPL-SCNC: 139 MMOL/L (ref 136–145)
TRIGL SERPL-MCNC: 109 MG/DL (ref 30–150)
TSH SERPL DL<=0.005 MIU/L-ACNC: 1.52 UIU/ML (ref 0.4–4)
WBC # BLD AUTO: 4.83 K/UL (ref 3.9–12.7)

## 2021-10-13 PROCEDURE — 90686 FLU VACCINE (QUAD) GREATER THAN OR EQUAL TO 3YO PRESERVATIVE FREE IM: ICD-10-PCS | Mod: S$GLB,,, | Performed by: NURSE PRACTITIONER

## 2021-10-13 PROCEDURE — 99396 PREV VISIT EST AGE 40-64: CPT | Mod: 25,S$GLB,, | Performed by: NURSE PRACTITIONER

## 2021-10-13 PROCEDURE — 80061 LIPID PANEL: CPT | Performed by: NURSE PRACTITIONER

## 2021-10-13 PROCEDURE — 82306 VITAMIN D 25 HYDROXY: CPT | Performed by: NURSE PRACTITIONER

## 2021-10-13 PROCEDURE — 80053 COMPREHEN METABOLIC PANEL: CPT | Performed by: NURSE PRACTITIONER

## 2021-10-13 PROCEDURE — 86803 HEPATITIS C AB TEST: CPT | Performed by: NURSE PRACTITIONER

## 2021-10-13 PROCEDURE — 90471 IMMUNIZATION ADMIN: CPT | Mod: S$GLB,,, | Performed by: NURSE PRACTITIONER

## 2021-10-13 PROCEDURE — 83036 HEMOGLOBIN GLYCOSYLATED A1C: CPT | Performed by: NURSE PRACTITIONER

## 2021-10-13 PROCEDURE — 36415 COLL VENOUS BLD VENIPUNCTURE: CPT | Performed by: NURSE PRACTITIONER

## 2021-10-13 PROCEDURE — 99999 PR PBB SHADOW E&M-EST. PATIENT-LVL III: ICD-10-PCS | Mod: PBBFAC,,, | Performed by: NURSE PRACTITIONER

## 2021-10-13 PROCEDURE — 84443 ASSAY THYROID STIM HORMONE: CPT | Performed by: NURSE PRACTITIONER

## 2021-10-13 PROCEDURE — 85025 COMPLETE CBC W/AUTO DIFF WBC: CPT | Performed by: NURSE PRACTITIONER

## 2021-10-13 PROCEDURE — 99999 PR PBB SHADOW E&M-EST. PATIENT-LVL III: CPT | Mod: PBBFAC,,, | Performed by: NURSE PRACTITIONER

## 2021-10-13 PROCEDURE — 90471 FLU VACCINE (QUAD) GREATER THAN OR EQUAL TO 3YO PRESERVATIVE FREE IM: ICD-10-PCS | Mod: S$GLB,,, | Performed by: NURSE PRACTITIONER

## 2021-10-13 PROCEDURE — 99396 PR PREVENTIVE VISIT,EST,40-64: ICD-10-PCS | Mod: 25,S$GLB,, | Performed by: NURSE PRACTITIONER

## 2021-10-13 PROCEDURE — 90686 IIV4 VACC NO PRSV 0.5 ML IM: CPT | Mod: S$GLB,,, | Performed by: NURSE PRACTITIONER

## 2021-10-13 RX ORDER — CIPROFLOXACIN 500 MG/1
500 TABLET ORAL 2 TIMES DAILY
COMMUNITY
Start: 2021-09-23 | End: 2022-02-11

## 2021-10-13 RX ORDER — NEOMYCIN SULFATE, POLYMYXIN B SULFATE AND DEXAMETHASONE 3.5; 10000; 1 MG/ML; [USP'U]/ML; MG/ML
1 SUSPENSION/ DROPS OPHTHALMIC 2 TIMES DAILY
Qty: 5 ML | Refills: 0 | Status: SHIPPED | OUTPATIENT
Start: 2021-10-13 | End: 2022-02-11

## 2021-10-13 RX ORDER — NEOMYCIN SULFATE, POLYMYXIN B SULFATE AND DEXAMETHASONE 3.5; 10000; 1 MG/ML; [USP'U]/ML; MG/ML
1 SUSPENSION/ DROPS OPHTHALMIC 2 TIMES DAILY
COMMUNITY
Start: 2021-09-23 | End: 2021-10-13 | Stop reason: SDUPTHER

## 2021-10-14 LAB — HCV AB SERPL QL IA: NEGATIVE

## 2022-02-11 ENCOUNTER — OFFICE VISIT (OUTPATIENT)
Dept: DERMATOLOGY | Facility: CLINIC | Age: 42
End: 2022-02-11
Payer: COMMERCIAL

## 2022-02-11 DIAGNOSIS — D22.9 NEVUS: ICD-10-CM

## 2022-02-11 DIAGNOSIS — L82.0 INFLAMED SEBORRHEIC KERATOSIS: Primary | ICD-10-CM

## 2022-02-11 DIAGNOSIS — R20.9 SKIN SENSATION DISTURBANCE: ICD-10-CM

## 2022-02-11 PROCEDURE — 17110 PR DESTRUCTION BENIGN LESIONS UP TO 14: ICD-10-PCS | Mod: S$GLB,,, | Performed by: DERMATOLOGY

## 2022-02-11 PROCEDURE — 99999 PR PBB SHADOW E&M-EST. PATIENT-LVL II: CPT | Mod: PBBFAC,,, | Performed by: DERMATOLOGY

## 2022-02-11 PROCEDURE — 99999 PR PBB SHADOW E&M-EST. PATIENT-LVL II: ICD-10-PCS | Mod: PBBFAC,,, | Performed by: DERMATOLOGY

## 2022-02-11 PROCEDURE — 99212 PR OFFICE/OUTPT VISIT, EST, LEVL II, 10-19 MIN: ICD-10-PCS | Mod: 25,S$GLB,, | Performed by: DERMATOLOGY

## 2022-02-11 PROCEDURE — 99212 OFFICE O/P EST SF 10 MIN: CPT | Mod: 25,S$GLB,, | Performed by: DERMATOLOGY

## 2022-02-11 PROCEDURE — 17110 DESTRUCTION B9 LES UP TO 14: CPT | Mod: S$GLB,,, | Performed by: DERMATOLOGY

## 2022-02-11 NOTE — PROGRESS NOTES
Subjective:       Patient ID:  Dae Amador is a 41 y.o. male who presents for   Chief Complaint   Patient presents with    Skin Check     HPI  Pt here today to recheck a mole on his groin. States it's changed since last visit.  It has grown and changed color.  Would also like to re-check mole that was measured last visit.    Review of Systems   Constitutional: Negative for fever, chills, weight loss, weight gain, fatigue, night sweats and malaise.   Skin: Negative for daily sunscreen use, activity-related sunscreen use and recent sunburn.   Hematologic/Lymphatic: Does not bruise/bleed easily.        Objective:    Physical Exam   Constitutional: He appears well-developed and well-nourished. No distress.   Neurological: He is alert and oriented to person, place, and time. He is not disoriented.   Psychiatric: He has a normal mood and affect.   Skin:   Areas Examined (abnormalities noted in diagram):   Back Inspection Performed              Diagram Legend     Erythematous scaling macule/papule c/w actinic keratosis       Vascular papule c/w angioma      Pigmented verrucoid papule/plaque c/w seborrheic keratosis      Yellow umbilicated papule c/w sebaceous hyperplasia      Irregularly shaped tan macule c/w lentigo     1-2 mm smooth white papules consistent with Milia      Movable subcutaneous cyst with punctum c/w epidermal inclusion cyst      Subcutaneous movable cyst c/w pilar cyst      Firm pink to brown papule c/w dermatofibroma      Pedunculated fleshy papule(s) c/w skin tag(s)      Evenly pigmented macule c/w junctional nevus     Mildly variegated pigmented, slightly irregular-bordered macule c/w mildly atypical nevus      Flesh colored to evenly pigmented papule c/w intradermal nevus       Pink pearly papule/plaque c/w basal cell carcinoma      Erythematous hyperkeratotic cursted plaque c/w SCC      Surgical scar with no sign of skin cancer recurrence      Open and closed comedones      Inflammatory papules  and pustules      Verrucoid papule consistent consistent with wart     Erythematous eczematous patches and plaques     Dystrophic onycholytic nail with subungual debris c/w onychomycosis     Umbilicated papule    Erythematous-base heme-crusted tan verrucoid plaque consistent with inflamed seborrheic keratosis     Erythematous Silvery Scaling Plaque c/w Psoriasis     See annotation      Assessment / Plan:        Inflamed seborrheic keratosis  Skin sensation disturbance  Cryosurgery procedure note:    Verbal consent from the patient is obtained including, but not limited to, risk of hypopigmentation/hyperpigmentation, scar, recurrence of lesion. Liquid nitrogen cryosurgery is applied to 1 lesions to produce a freeze injury. The patient is aware that blisters may form and is instructed on wound care with gentle cleansing and use of vaseline ointment to keep moist until healed. The patient is supplied a handout on cryosurgery and is instructed to call if lesions do not completely resolve.    Nevus  Benign-appearing nevi present on exam today. Reassurance provided. Periodically examine moles and return to clinic if any moles change or become symptomatic (bleeding, itching, pain, etc).    Follow up in about 1 year (around 2/11/2023) for skin check or sooner for any concerns.

## 2022-02-11 NOTE — PATIENT INSTRUCTIONS
SEBORRHEIC KERATOSES        What causes seborrheic keratoses?    Seborrheic keratoses are harmless, common skin growths that first appear during adult life.  As time goes by, more growths appear.  Some persons have a very large number of them.  Seborrheic keratoses appear on both covered and uncovered parts of the body; they are not caused by sunlight.  The tendency to develop seborrheic keratoses is inherited.    Seborrheic keratoses are harmless and never become malignant.  They begin as slightly raised, light brown spots.  Gradually they thicken and take on a rough wartlike surface.  They slowly darken and may turn black.  These color changes are harmless.  Seborrheic keratoses are superficial and look as if they were stuck on the skin.  Persons who have had several seborrheic keratoses can usually recognize this type of benign growth.  However, if you are concerned or unsure about any growth, consult me.    Treatment    Seborrheic keratoses can easily be removed in the office.  The only reason for removing a seborrheic keratosis is your wish to get rid of it.      CRYOSURGERY      Your doctor has used a method called cryosurgery to treat your skin condition. Cryosurgery refers to the use of very cold substances to treat a variety of skin conditions such as warts, pre-skin cancers, molluscum contagiosum, sun spots, and several benign growths. The substance we use in cryosurgery is liquid nitrogen and is so cold (-195 degrees Celsius) that is burns when administered.     Following treatment in the office, the skin may immediately burn and become red. You may find the area around the lesion is affected as well. It is sometimes necessary to treat not only the lesion, but a small area of the surrounding normal skin to achieve a good response.     A blister, and even a blood filled blister, may form after treatment.   This is a normal response. If the blister is painful, it is acceptable to sterilize a needle and with  rubbing alcohol and gently pop the blister. It is important that you gently wash the area with soap and warm water as the blister fluid may contain wart virus if a wart was treated. Do no remove the roof of the blister.     The area treated can take anywhere from 1-3 weeks to heal. Healing time depends on the kind skin lesion treated, the location, and how aggressively the lesion was treated. It is recommended that the areas treated are covered with Vaseline or bacitracin ointment and a band-aid. If a band-aid is not practical, just ointment applied several times per day will do. Keeping these areas moist will speed the healing time.    Treatment with liquid nitrogen can leave a scar. In dark skin, it may be a light or dark scar, in light skin it may be a white or pink scar. These will generally fade with time, but may never go away completely.     If you have any concerns after your treatment, please feel free to call the office.       [address] [place] [address]/ [phone] FAX/ [email or web address]    Sunscreen Recommendations:    Recommend daily sun protection/avoidance and use of at least SPF 30, broad spectrum sunscreen.     Based on a recent study (2021) and out of an abundance of caution, we are recommending that you AVOID the followin. Spray and gel sunscreens  2. Any [facility] or [facility] brands as well as Max Block and TopCare brands   3. Neutrogena Ultra Sheer Dry-touch Water Resistant Suncscreen LOTION SPF 70   4. Neutrogena Sheer Zinc Dry-touch Face Sunscreen LOTION SPF 50   5.   Aveeno Baby Continuous Protection Sensitive Skin Sunscreen LOTION - Broad Spectrum SPF 50    Please note that Benzene is not an ingredient or the degradation product of any ingredient in any sunscreen. This study suggested that the findings are a result of contamination in the manufacturing process. At this point, we don't know how effectively Benzene gets through the skin, if it gets absorbed  systemically, and what effects that may have.     We do know that ultraviolet radiation is a well-established carcinogen. Please use daily sun protection/avoidance and use of at least SPF 30, broad-spectrum sunscreen not listed above.

## 2023-08-17 ENCOUNTER — LAB VISIT (OUTPATIENT)
Dept: LAB | Facility: HOSPITAL | Age: 43
End: 2023-08-17
Payer: COMMERCIAL

## 2023-08-17 ENCOUNTER — OFFICE VISIT (OUTPATIENT)
Dept: INTERNAL MEDICINE | Facility: CLINIC | Age: 43
End: 2023-08-17
Payer: COMMERCIAL

## 2023-08-17 VITALS
HEIGHT: 70 IN | WEIGHT: 193.81 LBS | BODY MASS INDEX: 27.75 KG/M2 | DIASTOLIC BLOOD PRESSURE: 78 MMHG | OXYGEN SATURATION: 100 % | HEART RATE: 81 BPM | SYSTOLIC BLOOD PRESSURE: 100 MMHG

## 2023-08-17 DIAGNOSIS — R93.2 ABNORMAL CT OF LIVER: ICD-10-CM

## 2023-08-17 DIAGNOSIS — Z00.00 ANNUAL PHYSICAL EXAM: Primary | ICD-10-CM

## 2023-08-17 DIAGNOSIS — E55.9 VITAMIN D DEFICIENCY: ICD-10-CM

## 2023-08-17 DIAGNOSIS — R79.89 ELEVATED LFTS: ICD-10-CM

## 2023-08-17 DIAGNOSIS — R91.1 LUNG NODULE: ICD-10-CM

## 2023-08-17 DIAGNOSIS — Z00.00 ANNUAL PHYSICAL EXAM: ICD-10-CM

## 2023-08-17 DIAGNOSIS — D63.8 ANEMIA OF CHRONIC DISEASE: ICD-10-CM

## 2023-08-17 LAB
25(OH)D3+25(OH)D2 SERPL-MCNC: 35 NG/ML (ref 30–96)
ALBUMIN SERPL BCP-MCNC: 4.3 G/DL (ref 3.5–5.2)
ALP SERPL-CCNC: 64 U/L (ref 55–135)
ALT SERPL W/O P-5'-P-CCNC: 25 U/L (ref 10–44)
ANION GAP SERPL CALC-SCNC: 8 MMOL/L (ref 8–16)
AST SERPL-CCNC: 26 U/L (ref 10–40)
BASOPHILS # BLD AUTO: 0.02 K/UL (ref 0–0.2)
BASOPHILS NFR BLD: 0.4 % (ref 0–1.9)
BILIRUB SERPL-MCNC: 0.6 MG/DL (ref 0.1–1)
BUN SERPL-MCNC: 12 MG/DL (ref 6–20)
CALCIUM SERPL-MCNC: 9.1 MG/DL (ref 8.7–10.5)
CHLORIDE SERPL-SCNC: 105 MMOL/L (ref 95–110)
CHOLEST SERPL-MCNC: 187 MG/DL (ref 120–199)
CHOLEST/HDLC SERPL: 4.8 {RATIO} (ref 2–5)
CO2 SERPL-SCNC: 26 MMOL/L (ref 23–29)
CREAT SERPL-MCNC: 0.9 MG/DL (ref 0.5–1.4)
DIFFERENTIAL METHOD: ABNORMAL
EOSINOPHIL # BLD AUTO: 0 K/UL (ref 0–0.5)
EOSINOPHIL NFR BLD: 0.8 % (ref 0–8)
ERYTHROCYTE [DISTWIDTH] IN BLOOD BY AUTOMATED COUNT: 12.1 % (ref 11.5–14.5)
EST. GFR  (NO RACE VARIABLE): >60 ML/MIN/1.73 M^2
GLUCOSE SERPL-MCNC: 82 MG/DL (ref 70–110)
HCT VFR BLD AUTO: 41.2 % (ref 40–54)
HDLC SERPL-MCNC: 39 MG/DL (ref 40–75)
HDLC SERPL: 20.9 % (ref 20–50)
HGB BLD-MCNC: 13.4 G/DL (ref 14–18)
IMM GRANULOCYTES # BLD AUTO: 0.01 K/UL (ref 0–0.04)
IMM GRANULOCYTES NFR BLD AUTO: 0.2 % (ref 0–0.5)
LDLC SERPL CALC-MCNC: 129.6 MG/DL (ref 63–159)
LYMPHOCYTES # BLD AUTO: 2.1 K/UL (ref 1–4.8)
LYMPHOCYTES NFR BLD: 41.2 % (ref 18–48)
MCH RBC QN AUTO: 28.2 PG (ref 27–31)
MCHC RBC AUTO-ENTMCNC: 32.5 G/DL (ref 32–36)
MCV RBC AUTO: 87 FL (ref 82–98)
MONOCYTES # BLD AUTO: 0.4 K/UL (ref 0.3–1)
MONOCYTES NFR BLD: 7 % (ref 4–15)
NEUTROPHILS # BLD AUTO: 2.6 K/UL (ref 1.8–7.7)
NEUTROPHILS NFR BLD: 50.4 % (ref 38–73)
NONHDLC SERPL-MCNC: 148 MG/DL
NRBC BLD-RTO: 0 /100 WBC
PLATELET # BLD AUTO: 170 K/UL (ref 150–450)
PMV BLD AUTO: 12.2 FL (ref 9.2–12.9)
POTASSIUM SERPL-SCNC: 4.2 MMOL/L (ref 3.5–5.1)
PROT SERPL-MCNC: 6.9 G/DL (ref 6–8.4)
RBC # BLD AUTO: 4.76 M/UL (ref 4.6–6.2)
SODIUM SERPL-SCNC: 139 MMOL/L (ref 136–145)
TRIGL SERPL-MCNC: 92 MG/DL (ref 30–150)
TSH SERPL DL<=0.005 MIU/L-ACNC: 1.31 UIU/ML (ref 0.4–4)
WBC # BLD AUTO: 5.14 K/UL (ref 3.9–12.7)

## 2023-08-17 PROCEDURE — 3078F PR MOST RECENT DIASTOLIC BLOOD PRESSURE < 80 MM HG: ICD-10-PCS | Mod: CPTII,S$GLB,, | Performed by: NURSE PRACTITIONER

## 2023-08-17 PROCEDURE — 99999 PR PBB SHADOW E&M-EST. PATIENT-LVL III: CPT | Mod: PBBFAC,,, | Performed by: NURSE PRACTITIONER

## 2023-08-17 PROCEDURE — 84443 ASSAY THYROID STIM HORMONE: CPT | Performed by: NURSE PRACTITIONER

## 2023-08-17 PROCEDURE — 99396 PREV VISIT EST AGE 40-64: CPT | Mod: S$GLB,,, | Performed by: NURSE PRACTITIONER

## 2023-08-17 PROCEDURE — 85025 COMPLETE CBC W/AUTO DIFF WBC: CPT | Performed by: NURSE PRACTITIONER

## 2023-08-17 PROCEDURE — 99999 PR PBB SHADOW E&M-EST. PATIENT-LVL III: ICD-10-PCS | Mod: PBBFAC,,, | Performed by: NURSE PRACTITIONER

## 2023-08-17 PROCEDURE — 36415 COLL VENOUS BLD VENIPUNCTURE: CPT | Performed by: NURSE PRACTITIONER

## 2023-08-17 PROCEDURE — 99396 PR PREVENTIVE VISIT,EST,40-64: ICD-10-PCS | Mod: S$GLB,,, | Performed by: NURSE PRACTITIONER

## 2023-08-17 PROCEDURE — 82306 VITAMIN D 25 HYDROXY: CPT | Performed by: NURSE PRACTITIONER

## 2023-08-17 PROCEDURE — 1159F MED LIST DOCD IN RCRD: CPT | Mod: CPTII,S$GLB,, | Performed by: NURSE PRACTITIONER

## 2023-08-17 PROCEDURE — 3008F PR BODY MASS INDEX (BMI) DOCUMENTED: ICD-10-PCS | Mod: CPTII,S$GLB,, | Performed by: NURSE PRACTITIONER

## 2023-08-17 PROCEDURE — 3008F BODY MASS INDEX DOCD: CPT | Mod: CPTII,S$GLB,, | Performed by: NURSE PRACTITIONER

## 2023-08-17 PROCEDURE — 3078F DIAST BP <80 MM HG: CPT | Mod: CPTII,S$GLB,, | Performed by: NURSE PRACTITIONER

## 2023-08-17 PROCEDURE — 3074F PR MOST RECENT SYSTOLIC BLOOD PRESSURE < 130 MM HG: ICD-10-PCS | Mod: CPTII,S$GLB,, | Performed by: NURSE PRACTITIONER

## 2023-08-17 PROCEDURE — 80061 LIPID PANEL: CPT | Performed by: NURSE PRACTITIONER

## 2023-08-17 PROCEDURE — 3074F SYST BP LT 130 MM HG: CPT | Mod: CPTII,S$GLB,, | Performed by: NURSE PRACTITIONER

## 2023-08-17 PROCEDURE — 1159F PR MEDICATION LIST DOCUMENTED IN MEDICAL RECORD: ICD-10-PCS | Mod: CPTII,S$GLB,, | Performed by: NURSE PRACTITIONER

## 2023-08-17 PROCEDURE — 80053 COMPREHEN METABOLIC PANEL: CPT | Performed by: NURSE PRACTITIONER

## 2023-08-17 NOTE — PROGRESS NOTES
Internal Medicine Annual Exam       CHIEF COMPLAINT     The patient, Dae Amador, who is a 43 y.o. male with fatigue, nonspecific neuro sx (particularly B face paresthesias), transaminitis, AoCD, and lung nodule presents for an annual exam.    HPI     Elevated LFTs- resolved on last check 10/2021  CT 2019-  tiny hypoattenuating parenchymal focus within the dome    Paresthesias of the face - resolved. Reviewed MRI and MRA and carotid u/s     -  Tdap-2017  Flu-- needs   Covid-needs         Past Medical History:  History reviewed. No pertinent past medical history.    Past Surgical History:   Procedure Laterality Date    FINGER FRACTURE SURGERY Right     index finger, flag football injury    VASECTOMY          Family History   Problem Relation Age of Onset    No Known Problems Mother     No Known Problems Father     Cancer Maternal Grandmother         unknown details    Melanoma Maternal Grandfather     Cancer Maternal Grandfather     Diabetes Paternal Grandmother     No Known Problems Paternal Grandfather         Social History     Socioeconomic History    Marital status:    Tobacco Use    Smoking status: Never    Smokeless tobacco: Never   Substance and Sexual Activity    Alcohol use: Yes     Alcohol/week: 6.0 standard drinks of alcohol     Types: 2 Cans of beer, 4 Shots of liquor per week    Drug use: Never    Sexual activity: Yes     Partners: Female        Social History     Tobacco Use   Smoking Status Never   Smokeless Tobacco Never        Allergies as of 08/17/2023    (No Known Allergies)          Home Medications:  Prior to Admission medications    Not on File       Review of Systems:  Review of Systems   Constitutional:  Negative for activity change, chills, fatigue, fever and unexpected weight change.   HENT:  Negative for hearing loss, rhinorrhea and trouble swallowing.    Eyes:  Negative for discharge and visual disturbance.   Respiratory:  Negative for cough, chest tightness, shortness of  "breath and wheezing.    Cardiovascular:  Negative for chest pain and palpitations.   Gastrointestinal:  Negative for abdominal pain, blood in stool, constipation, diarrhea and vomiting.   Endocrine: Negative for polydipsia and polyuria.   Genitourinary:  Negative for difficulty urinating, hematuria and urgency.   Musculoskeletal:  Negative for arthralgias, joint swelling and neck pain.   Neurological:  Negative for dizziness, weakness, light-headedness and headaches.   Psychiatric/Behavioral:  Negative for confusion, dysphoric mood and sleep disturbance. The patient is not nervous/anxious.        Health Maintainence:   Immunizations:  Health Maintenance         Date Due Completion Date    Pneumococcal Vaccines (Age 0-64) (1 - PCV) Never done ---    COVID-19 Vaccine (3 - Moderna series) 05/13/2021 3/18/2021    Hemoglobin A1c (Prediabetes) 10/13/2022 10/13/2021    Influenza Vaccine (1) 09/01/2023 10/13/2021    Lipid Panel 10/13/2026 10/13/2021    TETANUS VACCINE 12/04/2027 12/4/2017 (Done)    Override on 12/4/2017: Done (per hx)             PHYSICAL EXAM     /78 (BP Location: Left arm, Patient Position: Sitting, BP Method: Medium (Manual))   Pulse 81   Ht 5' 10" (1.778 m)   Wt 87.9 kg (193 lb 12.6 oz)   SpO2 100%   BMI 27.81 kg/m²  Body mass index is 27.81 kg/m².    Physical Exam  Vitals reviewed.   Constitutional:       Appearance: He is well-developed.   HENT:      Head: Normocephalic.      Right Ear: External ear normal.      Left Ear: External ear normal.      Nose: Nose normal.      Mouth/Throat:      Pharynx: No oropharyngeal exudate.   Eyes:      Pupils: Pupils are equal, round, and reactive to light.   Neck:      Thyroid: No thyromegaly.      Vascular: No JVD.      Trachea: No tracheal deviation.   Cardiovascular:      Rate and Rhythm: Normal rate and regular rhythm.      Heart sounds: No murmur heard.     No friction rub. No gallop.   Pulmonary:      Effort: No respiratory distress.      Breath " sounds: Normal breath sounds. No wheezing or rales.   Chest:      Chest wall: No tenderness.   Abdominal:      General: Bowel sounds are normal. There is no distension.      Palpations: Abdomen is soft.      Tenderness: There is no abdominal tenderness.   Musculoskeletal:         General: No tenderness. Normal range of motion.   Lymphadenopathy:      Cervical: No cervical adenopathy.   Skin:     General: Skin is warm and dry.      Findings: No rash.   Neurological:      Mental Status: He is alert and oriented to person, place, and time.   Psychiatric:         Behavior: Behavior normal.         LABS     Lab Results   Component Value Date    HGBA1C 5.2 10/13/2021     CMP  Sodium   Date Value Ref Range Status   10/13/2021 139 136 - 145 mmol/L Final     Potassium   Date Value Ref Range Status   10/13/2021 4.2 3.5 - 5.1 mmol/L Final     Chloride   Date Value Ref Range Status   10/13/2021 104 95 - 110 mmol/L Final     CO2   Date Value Ref Range Status   10/13/2021 24 23 - 29 mmol/L Final     Glucose   Date Value Ref Range Status   10/13/2021 95 70 - 110 mg/dL Final     BUN   Date Value Ref Range Status   10/13/2021 12 6 - 20 mg/dL Final     Creatinine   Date Value Ref Range Status   10/13/2021 0.8 0.5 - 1.4 mg/dL Final     Calcium   Date Value Ref Range Status   10/13/2021 10.0 8.7 - 10.5 mg/dL Final     Total Protein   Date Value Ref Range Status   10/13/2021 7.3 6.0 - 8.4 g/dL Final     Albumin   Date Value Ref Range Status   10/13/2021 4.3 3.5 - 5.2 g/dL Final     Total Bilirubin   Date Value Ref Range Status   10/13/2021 0.6 0.1 - 1.0 mg/dL Final     Comment:     For infants and newborns, interpretation of results should be based  on gestational age, weight and in agreement with clinical  observations.    Premature Infant recommended reference ranges:  Up to 24 hours.............<8.0 mg/dL  Up to 48 hours............<12.0 mg/dL  3-5 days..................<15.0 mg/dL  6-29 days.................<15.0 mg/dL        Alkaline Phosphatase   Date Value Ref Range Status   10/13/2021 72 55 - 135 U/L Final     AST   Date Value Ref Range Status   10/13/2021 27 10 - 40 U/L Final     ALT   Date Value Ref Range Status   10/13/2021 29 10 - 44 U/L Final     Anion Gap   Date Value Ref Range Status   10/13/2021 11 8 - 16 mmol/L Final     eGFR if    Date Value Ref Range Status   10/13/2021 >60.0 >60 mL/min/1.73 m^2 Final     eGFR if non    Date Value Ref Range Status   10/13/2021 >60.0 >60 mL/min/1.73 m^2 Final     Comment:     Calculation used to obtain the estimated glomerular filtration  rate (eGFR) is the CKD-EPI equation.        Lab Results   Component Value Date    WBC 4.83 10/13/2021    HGB 13.4 (L) 10/13/2021    HCT 40.3 10/13/2021    MCV 85 10/13/2021     10/13/2021     Lab Results   Component Value Date    CHOL 214 (H) 10/13/2021    CHOL 189 09/21/2020    CHOL 120 07/26/2019     Lab Results   Component Value Date    HDL 49 10/13/2021    HDL 55 09/21/2020    HDL 26 (L) 07/26/2019     Lab Results   Component Value Date    LDLCALC 143.2 10/13/2021    LDLCALC 120.6 09/21/2020    LDLCALC 75.4 07/26/2019     Lab Results   Component Value Date    TRIG 109 10/13/2021    TRIG 67 09/21/2020    TRIG 93 07/26/2019     Lab Results   Component Value Date    CHOLHDL 22.9 10/13/2021    CHOLHDL 29.1 09/21/2020    CHOLHDL 21.7 07/26/2019     Lab Results   Component Value Date    TSH 1.523 10/13/2021       ASSESSMENT/PLAN     Dae Amador is a 43 y.o. male    Annual physical exam- All age and gender related screenings discussed   -     CBC Auto Differential; Future; Expected date: 08/17/2023  -     Comprehensive Metabolic Panel; Future; Expected date: 08/17/2023  -     Lipid Panel; Future; Expected date: 08/17/2023  -     TSH; Future; Expected date: 08/17/2023  -     Vitamin D; Future; Expected date: 08/17/2023    Anemia of chronic disease- stable. Will repeat CBC     Vitamin D deficiency- stable. Will cont  to monitor vitamin D     Elevated LFTs/Abnormal CT of liver- stable. Will repeat CMP     Lung nodule- <2 mm in low risk pt. Will monitor     Follow up with PCP in 1 year for annual     Kathy Schwarz-DOMINIC Lewis, FNP-c   Department of Internal Medicine - [facility]  1:14 PM

## 2024-04-05 ENCOUNTER — OFFICE VISIT (OUTPATIENT)
Dept: UROLOGY | Facility: CLINIC | Age: 44
End: 2024-04-05
Payer: COMMERCIAL

## 2024-04-05 VITALS
WEIGHT: 193.81 LBS | DIASTOLIC BLOOD PRESSURE: 79 MMHG | HEART RATE: 68 BPM | SYSTOLIC BLOOD PRESSURE: 124 MMHG | BODY MASS INDEX: 27.75 KG/M2 | HEIGHT: 70 IN

## 2024-04-05 DIAGNOSIS — N39.43 POST-VOID DRIBBLING: ICD-10-CM

## 2024-04-05 DIAGNOSIS — Z98.52 HISTORY OF VASECTOMY: Primary | ICD-10-CM

## 2024-04-05 DIAGNOSIS — L72.9 SCROTAL CYST: ICD-10-CM

## 2024-04-05 DIAGNOSIS — R39.9 LOWER URINARY TRACT SYMPTOMS (LUTS): ICD-10-CM

## 2024-04-05 LAB
BILIRUB SERPL-MCNC: NORMAL MG/DL
BLOOD URINE, POC: NORMAL
CLARITY, POC UA: CLEAR
COLOR, POC UA: YELLOW
GLUCOSE UR QL STRIP: NORMAL
KETONES UR QL STRIP: NORMAL
LEUKOCYTE ESTERASE URINE, POC: NORMAL
NITRITE, POC UA: NORMAL
PH, POC UA: 6
POC RESIDUAL URINE VOLUME: 0 ML (ref 0–100)
PROTEIN, POC: NORMAL
SPECIFIC GRAVITY, POC UA: 1.01
UROBILINOGEN, POC UA: NORMAL

## 2024-04-05 PROCEDURE — 99214 OFFICE O/P EST MOD 30 MIN: CPT | Mod: S$GLB,,, | Performed by: NURSE PRACTITIONER

## 2024-04-05 PROCEDURE — 81002 URINALYSIS NONAUTO W/O SCOPE: CPT | Mod: S$GLB,,, | Performed by: NURSE PRACTITIONER

## 2024-04-05 PROCEDURE — 51798 US URINE CAPACITY MEASURE: CPT | Mod: S$GLB,,, | Performed by: NURSE PRACTITIONER

## 2024-04-05 PROCEDURE — 1159F MED LIST DOCD IN RCRD: CPT | Mod: CPTII,S$GLB,, | Performed by: NURSE PRACTITIONER

## 2024-04-05 PROCEDURE — 3008F BODY MASS INDEX DOCD: CPT | Mod: CPTII,S$GLB,, | Performed by: NURSE PRACTITIONER

## 2024-04-05 PROCEDURE — 3078F DIAST BP <80 MM HG: CPT | Mod: CPTII,S$GLB,, | Performed by: NURSE PRACTITIONER

## 2024-04-05 PROCEDURE — 3074F SYST BP LT 130 MM HG: CPT | Mod: CPTII,S$GLB,, | Performed by: NURSE PRACTITIONER

## 2024-04-05 PROCEDURE — 99999 PR PBB SHADOW E&M-EST. PATIENT-LVL III: CPT | Mod: PBBFAC,,, | Performed by: NURSE PRACTITIONER

## 2024-04-05 PROCEDURE — 1160F RVW MEDS BY RX/DR IN RCRD: CPT | Mod: CPTII,S$GLB,, | Performed by: NURSE PRACTITIONER

## 2024-04-05 NOTE — PROGRESS NOTES
CHIEF COMPLAINT:    Dae Amador is a 43 y.o. male presents today for LUTS    HISTORY OF PRESENTING ILLINESS:    Dae Amador is a 43 y.o. male who was last seen in our clinic 05/20/2021 with Dr. Munoz. 07/16/2021 s/p vasectomy with 2 (-) post vas samples.     He is here today to discuss some issues with urination and changes after his vasectomy. Reports that after he urinates, he thinks he is complete but then more urine comes out. The urine amount could be dribbling but also could be more. There is no dysuria, hematuria, lower abdomin/flank discomfort. Denies any abnormal frequency or urgency.   Nocturia 0-1x.   He hydrates well; especially at night.   He is a coffee drinker.     Since his vasectomy he reports numbness to areas of scrotum. He as noticed more prominent raised areas on the scrotum; as well as dark blue veins.   Denies any areas erythematous lesions/fluctuance or draining         REVIEW OF SYSTEMS:  Review of Systems   Constitutional: Negative.  Negative for chills and fever.   Eyes:  Negative for double vision.   Respiratory:  Negative for cough and shortness of breath.    Cardiovascular:  Negative for chest pain and palpitations.   Gastrointestinal:  Negative for abdominal pain, constipation, diarrhea, nausea and vomiting.   Genitourinary:         See HPI   Musculoskeletal:  Negative for falls.   Neurological:  Positive for sensory change (areas of scrotum). Negative for dizziness and seizures.   Endo/Heme/Allergies:  Negative for polydipsia.         PATIENT HISTORY:    History reviewed. No pertinent past medical history.    Past Surgical History:   Procedure Laterality Date    FINGER FRACTURE SURGERY Right     index finger, flag football injury    VASECTOMY         Family History   Problem Relation Age of Onset    No Known Problems Mother     No Known Problems Father     Cancer Maternal Grandmother         unknown details    Melanoma Maternal Grandfather     Cancer Maternal Grandfather      Diabetes Paternal Grandmother     No Known Problems Paternal Grandfather        Social History     Socioeconomic History    Marital status:    Tobacco Use    Smoking status: Never    Smokeless tobacco: Never   Substance and Sexual Activity    Alcohol use: Yes     Alcohol/week: 6.0 standard drinks of alcohol     Types: 2 Cans of beer, 4 Shots of liquor per week    Drug use: Never    Sexual activity: Yes     Partners: Female     Social Determinants of Health     Financial Resource Strain: Low Risk  (4/2/2024)    Overall Financial Resource Strain (CARDIA)     Difficulty of Paying Living Expenses: Not hard at all   Food Insecurity: No Food Insecurity (4/2/2024)    Hunger Vital Sign     Worried About Running Out of Food in the Last Year: Never true     Ran Out of Food in the Last Year: Never true   Transportation Needs: No Transportation Needs (4/2/2024)    PRAPARE - Transportation     Lack of Transportation (Medical): No     Lack of Transportation (Non-Medical): No   Physical Activity: Insufficiently Active (4/2/2024)    Exercise Vital Sign     Days of Exercise per Week: 3 days     Minutes of Exercise per Session: 30 min   Stress: No Stress Concern Present (4/2/2024)    [place] [employer] - Occupational Stress Questionnaire     Feeling of Stress : Only a little   Social Connections: Unknown (4/2/2024)    Social Connection and Isolation Panel [NHANES]     Frequency of Communication with Friends and Family: More than three times a week     Frequency of Social Gatherings with Friends and Family: Twice a week     Active Member of Clubs or Organizations: Yes     Attends Club or Organization Meetings: More than 4 times per year     Marital Status:    Housing Stability: Low Risk  (4/2/2024)    Housing Stability Vital Sign     Unable to Pay for Housing in the Last Year: No     Number of Places Lived in the Last Year: 1     Unstable Housing in the Last Year: No       Allergies:  Patient has no  "known allergies.    Medications:  No current outpatient medications on file.    PHYSICAL EXAMINATION:  Physical Exam  Vitals and nursing note reviewed.   Constitutional:       General: He is awake.      Appearance: Normal appearance.   HENT:      Head: Normocephalic.      Right Ear: External ear normal.      Left Ear: External ear normal.      Nose: Nose normal.   Cardiovascular:      Rate and Rhythm: Normal rate.   Pulmonary:      Effort: Pulmonary effort is normal. No respiratory distress.   Abdominal:      Tenderness: There is no abdominal tenderness. There is no right CVA tenderness or left CVA tenderness.   Genitourinary:     Penis: Normal and circumcised. No hypospadias.       Testes: Normal.         Right: Mass, tenderness or swelling not present.         Left: Mass, tenderness or swelling not present.      Comments: Normal appearing scrotum with good contour without hair present; small nonpalpable capillary noted on scrotum.  Does have raised, whitish firm papules noted.    Musculoskeletal:         General: Normal range of motion.      Cervical back: Normal range of motion.   Skin:     General: Skin is warm and dry.   Neurological:      General: No focal deficit present.      Mental Status: He is alert and oriented to person, place, and time.   Psychiatric:         Mood and Affect: Mood normal.         Behavior: Behavior is cooperative.           LABS:      In office UA today was clear of active infection and blood.     The PVR in the office today done immediately after urination by the nurse was 0.        No results found for: "PSA", "PSADIAG", "PSATOTAL", "PHIND"    Lab Results   Component Value Date    CREATININE 0.9 08/17/2023    EGFRNORACEVR >60.0 08/17/2023               IMPRESSION:    Encounter Diagnoses   Name Primary?    History of vasectomy Yes    Lower urinary tract symptoms (LUTS)     Post-void dribbling     Scrotal cyst          Assessment:       1. History of vasectomy    2. Lower urinary tract " symptoms (LUTS)    3. Post-void dribbling    4. Scrotal cyst        Plan:         I spent 30 minutes with the patient of which more than half was spent in direct consultation with the patient in regards to our treatment and plan.  We addressed the office findings and recent labs; any need to go ER today.   Education and recommendations of today's plan of care including home remedies and needed follow up with PCP.   We discussed the chief complaints; reviewed the LUTS and the possible contributory factors. Expectations post vasectomy; the effects of coffee,caffeine, alcohol on bladder.   Reassurance no infection or visible blood seen in today's urine sample and he is emptying his bladder.   Discussed the normal scrotal wall tissue; expectations with grooming.  No concern.   Reviewed management; including possible medical and surgical options.   Recommended lifestyle modifications with a proper, healthy diet, good hydration but during the day. Reducing bladder irritants.   Benefits of regular exercise   RTC PRN

## 2024-06-27 ENCOUNTER — OFFICE VISIT (OUTPATIENT)
Dept: DERMATOLOGY | Facility: CLINIC | Age: 44
End: 2024-06-27
Payer: COMMERCIAL

## 2024-06-27 DIAGNOSIS — L82.1 SEBORRHEIC KERATOSIS: ICD-10-CM

## 2024-06-27 DIAGNOSIS — D48.5 NEOPLASM OF UNCERTAIN BEHAVIOR OF SKIN: Primary | ICD-10-CM

## 2024-06-27 PROCEDURE — 1160F RVW MEDS BY RX/DR IN RCRD: CPT | Mod: CPTII,S$GLB,, | Performed by: DERMATOLOGY

## 2024-06-27 PROCEDURE — 1159F MED LIST DOCD IN RCRD: CPT | Mod: CPTII,S$GLB,, | Performed by: DERMATOLOGY

## 2024-06-27 PROCEDURE — 99212 OFFICE O/P EST SF 10 MIN: CPT | Mod: 25,S$GLB,, | Performed by: DERMATOLOGY

## 2024-06-27 PROCEDURE — 11301 SHAVE SKIN LESION 0.6-1.0 CM: CPT | Mod: S$GLB,,, | Performed by: DERMATOLOGY

## 2024-06-27 NOTE — PROGRESS NOTES
Patient Information  Name: Dae Amador  : 1980  MRN: [id number]     Referring Physician:  No ref. provider found   Primary Care Physician:  Miladis Sequeira MD   Date of Visit: 2024      Subjective:     History of Present lllness:    Dae Amador is a 43 y.o. male who presents with a chief complaint of spot and lesion.    Spot  Location: back  Duration: 1 year +  Signs/Symptoms: starting to come back  Exacerbating factors: none  Relieving factors/Prior treatments: none    Lesion  Location: left lower calf  Duration: 1 year +  Signs/Symptoms: months  Exacerbating factors: none  Relieving factors/Prior treatments: none    Clinical documentation obtained by nursing staff reviewed.    Review of Systems    Objective:   Physical Exam   Constitutional: He appears well-developed and well-nourished. No distress.   Neurological: He is alert and oriented to person, place, and time. He is not disoriented.   Psychiatric: He has a normal mood and affect.   Skin:   Areas Examined (abnormalities noted in diagram):   Back Inspection Performed  LLE Inspection Performed            Diagram Legend     Erythematous scaling macule/papule c/w actinic keratosis       Vascular papule c/w angioma      Pigmented verrucoid papule/plaque c/w seborrheic keratosis      Yellow umbilicated papule c/w sebaceous hyperplasia      Irregularly shaped tan macule c/w lentigo     1-2 mm smooth white papules consistent with Milia      Movable subcutaneous cyst with punctum c/w epidermal inclusion cyst      Subcutaneous movable cyst c/w pilar cyst      Firm pink to brown papule c/w dermatofibroma      Pedunculated fleshy papule(s) c/w skin tag(s)      Evenly pigmented macule c/w junctional nevus     Mildly variegated pigmented, slightly irregular-bordered macule c/w mildly atypical nevus      Flesh colored to evenly pigmented papule c/w intradermal nevus       Pink pearly papule/plaque c/w basal cell carcinoma      Erythematous hyperkeratotic  cursted plaque c/w SCC      Surgical scar with no sign of skin cancer recurrence      Open and closed comedones      Inflammatory papules and pustules      Verrucoid papule consistent consistent with wart     Erythematous eczematous patches and plaques     Dystrophic onycholytic nail with subungual debris c/w onychomycosis     Umbilicated papule    Erythematous-base heme-crusted tan verrucoid plaque consistent with inflamed seborrheic keratosis     Erythematous Silvery Scaling Plaque c/w Psoriasis     See annotation          [] Data reviewed  [] Prior external notes reviewed  [] Independent review of test  [] Management discussed with another provider  [] Independent historian    Assessment / Plan:      Pathology Orders:       Normal Orders This Visit    Specimen to Pathology, Dermatology     Comments:    Number of Specimens:->1  ------------------------->-------------------------  Spec 1 Procedure:->Biopsy  Spec 1 Clinical Impression:->r/o dysplastic nevus  Spec 1 Source:->midline mid back    Questions:    Procedure Type: Dermatology and skin neoplasms    Number of Specimens: 1    ------------------------: -------------------------    Spec 1 Procedure: Biopsy    Spec 1 Clinical Impression: r/o dysplastic nevus    Spec 1 Source: midline mid back    Release to patient:           Neoplasm of uncertain behavior of skin  -     Specimen to Pathology, Dermatology    Shave removal procedure note:  Risk, benefits, and alternatives of shave removal are discussed with the patient, including risk of infection, scar, recurrence, and need for additional treatment of site. The patient agrees to the procedure by verbal consent. The area is marked and prepped with alcohol.  Approximately 1 mL of lidocaine 1% with epinephrine is used for local anesthesia. A sharp blade is used to remove the entire lesion with a minimal margin of normal-appearing skin. The specimen is sent to pathology for histologic confirmation. Hemostasis is  obtained with aluminum chloride and/or monopolar hyfrecation if needed. The area is then dressed and bandaged. The patient tolerated the procedure well without adverse event. Written instructions on wound care were given and were reviewed with the patient, who is to call for any signs of bleeding or infection. The patient will be notified of the pathology results.  Size of lesion: 9 x 7 mm    Seborrheic keratosis  These are benign, inherited growths without a malignant potential. Reassurance given to patient. No treatment is necessary.      Follow up dependent on pathology results.      Chantell De Oliveira MD, FAAD  [facility]

## 2024-06-27 NOTE — PATIENT INSTRUCTIONS
Wound Care Instructions    Leave the bandage on for 24 hours without getting it wet.   Clean the area once a day with a gentle soap and water, then pat dry and apply Vaseline and a bandaid.  The site should be kept moist with Vaseline at all times to improve healing. Reapply a thick coating as needed. Do not let the site air out or form a scab, as this will delay healing and worsen scarring.  If any bleeding or oozing occurs once you return home, apply firm pressure to the area for 30 minutes straight without peeking. If bleeding continues, call the office immediately.  Please message us via [facility], call us at [phone], or return to the office at any sign of increasing redness, swelling, tenderness, pain, heat, yellow drainage/discharge, or continued bleeding.      Receiving Your Pathology Results    Your pathology results will be released to you on [facility] at the same time that Dr. De Oliveira receives them.   Dr. De Oliveira will then message you with her interpretation of the results and/or with the plan going forward.  If you do not use [facility] or if your pathology results require more of an explanation, you will receive your results via a phone call.  If 2 weeks go by and you have not received your results, please message us via [facility] or call us at [phone] to inform us.                   Hydrocolloid Bandage    These bandages can be found at your local pharmacy in first [employer] or [employer].  Apply the bandage to clean, dry skin. Press and hold top of bandage once it is applied to warm the bandage.  Please note: There area where bandage adheres to wound bed will become white and puffy; this is not infection and a normal part of the healing process.  Do not change bandage until edges roll up.  When bandage is ready to be changed, remove carefully and slowly.  Wash wound with gentle cleanser and fingertips.  Make sure area is completely dry before applying new bandage.  Make sure wound bed is  in the center of the bandage.  Repeat process until fresh pink skin covers wound bed or until bandage no longer become white and puffy.

## 2024-10-08 ENCOUNTER — OFFICE VISIT (OUTPATIENT)
Dept: INTERNAL MEDICINE | Facility: CLINIC | Age: 44
End: 2024-10-08
Payer: COMMERCIAL

## 2024-10-08 ENCOUNTER — LAB VISIT (OUTPATIENT)
Dept: LAB | Facility: HOSPITAL | Age: 44
End: 2024-10-08
Payer: COMMERCIAL

## 2024-10-08 VITALS
DIASTOLIC BLOOD PRESSURE: 86 MMHG | HEART RATE: 92 BPM | BODY MASS INDEX: 25.5 KG/M2 | WEIGHT: 182.13 LBS | SYSTOLIC BLOOD PRESSURE: 120 MMHG | HEIGHT: 71 IN | OXYGEN SATURATION: 98 %

## 2024-10-08 DIAGNOSIS — Z00.00 ANNUAL PHYSICAL EXAM: ICD-10-CM

## 2024-10-08 DIAGNOSIS — Z00.00 ANNUAL PHYSICAL EXAM: Primary | ICD-10-CM

## 2024-10-08 DIAGNOSIS — R79.89 ELEVATED LFTS: ICD-10-CM

## 2024-10-08 DIAGNOSIS — D63.8 ANEMIA OF CHRONIC DISEASE: ICD-10-CM

## 2024-10-08 DIAGNOSIS — R91.1 LUNG NODULE: ICD-10-CM

## 2024-10-08 DIAGNOSIS — R93.2 ABNORMAL CT OF LIVER: ICD-10-CM

## 2024-10-08 DIAGNOSIS — E55.9 VITAMIN D DEFICIENCY: ICD-10-CM

## 2024-10-08 DIAGNOSIS — R29.90 EPISODE OF TRANSIENT NEUROLOGIC SYMPTOMS: ICD-10-CM

## 2024-10-08 LAB
25(OH)D3+25(OH)D2 SERPL-MCNC: 26 NG/ML (ref 30–96)
ALBUMIN SERPL BCP-MCNC: 4.4 G/DL (ref 3.5–5.2)
ALP SERPL-CCNC: 82 U/L (ref 55–135)
ALT SERPL W/O P-5'-P-CCNC: 27 U/L (ref 10–44)
ANION GAP SERPL CALC-SCNC: 12 MMOL/L (ref 8–16)
AST SERPL-CCNC: 25 U/L (ref 10–40)
BASOPHILS # BLD AUTO: 0.04 K/UL (ref 0–0.2)
BASOPHILS NFR BLD: 0.7 % (ref 0–1.9)
BILIRUB SERPL-MCNC: 0.8 MG/DL (ref 0.1–1)
BUN SERPL-MCNC: 11 MG/DL (ref 6–20)
CALCIUM SERPL-MCNC: 10 MG/DL (ref 8.7–10.5)
CHLORIDE SERPL-SCNC: 103 MMOL/L (ref 95–110)
CHOLEST SERPL-MCNC: 220 MG/DL (ref 120–199)
CHOLEST/HDLC SERPL: 4.1 {RATIO} (ref 2–5)
CO2 SERPL-SCNC: 25 MMOL/L (ref 23–29)
CREAT SERPL-MCNC: 1 MG/DL (ref 0.5–1.4)
DIFFERENTIAL METHOD BLD: NORMAL
EOSINOPHIL # BLD AUTO: 0.1 K/UL (ref 0–0.5)
EOSINOPHIL NFR BLD: 0.9 % (ref 0–8)
ERYTHROCYTE [DISTWIDTH] IN BLOOD BY AUTOMATED COUNT: 11.9 % (ref 11.5–14.5)
EST. GFR  (NO RACE VARIABLE): >60 ML/MIN/1.73 M^2
ESTIMATED AVG GLUCOSE: 108 MG/DL (ref 68–131)
GLUCOSE SERPL-MCNC: 97 MG/DL (ref 70–110)
HBA1C MFR BLD: 5.4 % (ref 4–5.6)
HCT VFR BLD AUTO: 48.2 % (ref 40–54)
HDLC SERPL-MCNC: 54 MG/DL (ref 40–75)
HDLC SERPL: 24.5 % (ref 20–50)
HGB BLD-MCNC: 15.9 G/DL (ref 14–18)
IMM GRANULOCYTES # BLD AUTO: 0.02 K/UL (ref 0–0.04)
IMM GRANULOCYTES NFR BLD AUTO: 0.3 % (ref 0–0.5)
LDLC SERPL CALC-MCNC: 114 MG/DL (ref 63–159)
LYMPHOCYTES # BLD AUTO: 1.9 K/UL (ref 1–4.8)
LYMPHOCYTES NFR BLD: 32.8 % (ref 18–48)
MCH RBC QN AUTO: 28.9 PG (ref 27–31)
MCHC RBC AUTO-ENTMCNC: 33 G/DL (ref 32–36)
MCV RBC AUTO: 88 FL (ref 82–98)
MONOCYTES # BLD AUTO: 0.4 K/UL (ref 0.3–1)
MONOCYTES NFR BLD: 7.1 % (ref 4–15)
NEUTROPHILS # BLD AUTO: 3.4 K/UL (ref 1.8–7.7)
NEUTROPHILS NFR BLD: 58.2 % (ref 38–73)
NONHDLC SERPL-MCNC: 166 MG/DL
NRBC BLD-RTO: 0 /100 WBC
PLATELET # BLD AUTO: 216 K/UL (ref 150–450)
PMV BLD AUTO: 12 FL (ref 9.2–12.9)
POTASSIUM SERPL-SCNC: 4.5 MMOL/L (ref 3.5–5.1)
PROT SERPL-MCNC: 7.4 G/DL (ref 6–8.4)
RBC # BLD AUTO: 5.51 M/UL (ref 4.6–6.2)
SODIUM SERPL-SCNC: 140 MMOL/L (ref 136–145)
TRIGL SERPL-MCNC: 260 MG/DL (ref 30–150)
TSH SERPL DL<=0.005 MIU/L-ACNC: 1.54 UIU/ML (ref 0.4–4)
WBC # BLD AUTO: 5.77 K/UL (ref 3.9–12.7)

## 2024-10-08 PROCEDURE — 84443 ASSAY THYROID STIM HORMONE: CPT | Performed by: NURSE PRACTITIONER

## 2024-10-08 PROCEDURE — 80053 COMPREHEN METABOLIC PANEL: CPT | Performed by: NURSE PRACTITIONER

## 2024-10-08 PROCEDURE — 83036 HEMOGLOBIN GLYCOSYLATED A1C: CPT | Performed by: NURSE PRACTITIONER

## 2024-10-08 PROCEDURE — 99396 PREV VISIT EST AGE 40-64: CPT | Mod: S$GLB,,, | Performed by: NURSE PRACTITIONER

## 2024-10-08 PROCEDURE — 82306 VITAMIN D 25 HYDROXY: CPT | Performed by: NURSE PRACTITIONER

## 2024-10-08 PROCEDURE — 3008F BODY MASS INDEX DOCD: CPT | Mod: CPTII,S$GLB,, | Performed by: NURSE PRACTITIONER

## 2024-10-08 PROCEDURE — 1159F MED LIST DOCD IN RCRD: CPT | Mod: CPTII,S$GLB,, | Performed by: NURSE PRACTITIONER

## 2024-10-08 PROCEDURE — 1160F RVW MEDS BY RX/DR IN RCRD: CPT | Mod: CPTII,S$GLB,, | Performed by: NURSE PRACTITIONER

## 2024-10-08 PROCEDURE — 3079F DIAST BP 80-89 MM HG: CPT | Mod: CPTII,S$GLB,, | Performed by: NURSE PRACTITIONER

## 2024-10-08 PROCEDURE — 85025 COMPLETE CBC W/AUTO DIFF WBC: CPT | Performed by: NURSE PRACTITIONER

## 2024-10-08 PROCEDURE — 99999 PR PBB SHADOW E&M-EST. PATIENT-LVL III: CPT | Mod: PBBFAC,,, | Performed by: NURSE PRACTITIONER

## 2024-10-08 PROCEDURE — 3074F SYST BP LT 130 MM HG: CPT | Mod: CPTII,S$GLB,, | Performed by: NURSE PRACTITIONER

## 2024-10-08 PROCEDURE — 80061 LIPID PANEL: CPT | Performed by: NURSE PRACTITIONER

## 2024-10-08 PROCEDURE — 36415 COLL VENOUS BLD VENIPUNCTURE: CPT | Performed by: NURSE PRACTITIONER

## 2024-10-08 NOTE — PROGRESS NOTES
Internal Medicine Annual Exam       CHIEF COMPLAINT     The patient, Dae Amador, who is a 44 y.o. male  with fatigue, nonspecific neuro sx (particularly B face paresthesias), transaminitis, AoCD, and lung nodule presents for an annual exam.    HPI   He is an established pt of Dr Sequeira - last seen 8/17/2023 by me for annual     Taking OTC multivitamin     Elevated LFTs- resolved   CT abd 2019- mild splenomegaly   Liver is normal in size containing a tiny hypoattenuating parenchymal focus within the dome, which is too small to characterize. Spleen is mildly enlarged without focal process seen. Gallbladder, pancreas and bilateral adrenal glands are within normal limits     Incidental pulm nodules- low risk , non-smoker and no fam hx lung CA or personal hx malignancy.  Likely will not req f/u.     Paresthesias of the face - resolved. Reviewed MRI and MRA and carotid u/s - has resolved      HM-  Tdap-2017  Flu-- refused   Covid-needs   C-scope at 46  Prostate at 51        Past Medical History:  History reviewed. No pertinent past medical history.    Past Surgical History:   Procedure Laterality Date    FINGER FRACTURE SURGERY Right     index finger, flag football injury    VASECTOMY          Family History   Problem Relation Name Age of Onset    No Known Problems Mother      No Known Problems Father      Cancer Maternal Grandmother Greene         unknown details    Melanoma Maternal Grandfather Arnulfo NAYLOR [place]     Cancer Maternal Grandfather Arnulfo NAYLOR [place]     Diabetes Paternal Grandmother Marjorie     No Known Problems Paternal Grandfather          Social History     Socioeconomic History    Marital status: Legally    Tobacco Use    Smoking status: Never    Smokeless tobacco: Never   Substance and Sexual Activity    Alcohol use: Yes     Alcohol/week: 6.0 standard drinks of alcohol     Types: 2 Cans of beer, 4 Shots of liquor per week    Drug use: Never    Sexual activity: Yes     Partners: Female      Social Drivers of Health     Financial Resource Strain: Low Risk  (4/2/2024)    Overall Financial Resource Strain (CARDIA)     Difficulty of Paying Living Expenses: Not hard at all   Food Insecurity: No Food Insecurity (4/2/2024)    Hunger Vital Sign     Worried About Running Out of Food in the Last Year: Never true     Ran Out of Food in the Last Year: Never true   Transportation Needs: No Transportation Needs (4/2/2024)    PRAPARE - Transportation     Lack of Transportation (Medical): No     Lack of Transportation (Non-Medical): No   Physical Activity: Insufficiently Active (4/2/2024)    Exercise Vital Sign     Days of Exercise per Week: 3 days     Minutes of Exercise per Session: 30 min   Stress: No Stress Concern Present (4/2/2024)    [employer] [place] [employer] - Occupational Stress Questionnaire     Feeling of Stress : Only a little   Housing Stability: Low Risk  (4/2/2024)    Housing Stability Vital Sign     Unable to Pay for Housing in the Last Year: No     Number of Places Lived in the Last Year: 1     Unstable Housing in the Last Year: No        Social History     Tobacco Use   Smoking Status Never   Smokeless Tobacco Never        Allergies as of 10/08/2024    (No Known Allergies)          Home Medications:  Prior to Admission medications    Not on File       Review of Systems:  Review of Systems   Constitutional:  Negative for activity change, chills, diaphoresis, fever and unexpected weight change.   HENT:  Negative for hearing loss, rhinorrhea and trouble swallowing.    Eyes:  Negative for discharge and visual disturbance.   Respiratory:  Negative for cough, chest tightness, shortness of breath and wheezing.    Cardiovascular:  Negative for chest pain and palpitations.   Gastrointestinal:  Negative for abdominal pain, blood in stool, constipation, diarrhea and vomiting.   Endocrine: Negative for polydipsia and polyuria.   Genitourinary:  Negative for difficulty urinating, hematuria and  "urgency.   Musculoskeletal:  Negative for arthralgias, joint swelling and neck pain.   Neurological:  Negative for dizziness, weakness, light-headedness and headaches.   Psychiatric/Behavioral:  Negative for confusion, dysphoric mood and sleep disturbance.        Health Maintainence:   Immunizations:  Health Maintenance         Date Due Completion Date    Pneumococcal Vaccines (Age 0-64) (1 of 2 - PCV) Never done ---    COVID-19 Vaccine (3 - 2024-25 season) 09/01/2024 3/18/2021    Hemoglobin A1c (Diabetic Prevention Screening) 10/13/2024 10/13/2021    TETANUS VACCINE 12/04/2027 12/4/2017 (Done)    Override on 12/4/2017: Done (per hx)    Lipid Panel 08/17/2028 8/17/2023    RSV Vaccine (Age 60+ and Pregnant patients) (1 - 1-dose 75+ series) 07/25/2056 ---             PHYSICAL EXAM     /86 (BP Location: Right arm, Patient Position: Sitting)   Pulse 92   Ht 5' 10.5" (1.791 m)   Wt 82.6 kg (182 lb 1.6 oz)   SpO2 98%   BMI 25.76 kg/m²  Body mass index is 25.76 kg/m².    Physical Exam  Vitals reviewed.   Constitutional:       Appearance: He is well-developed.   HENT:      Head: Normocephalic.      Right Ear: External ear normal.      Left Ear: External ear normal.      Nose: Nose normal.      Mouth/Throat:      Pharynx: No oropharyngeal exudate.   Eyes:      Pupils: Pupils are equal, round, and reactive to light.   Neck:      Thyroid: No thyromegaly.      Vascular: No JVD.      Trachea: No tracheal deviation.   Cardiovascular:      Rate and Rhythm: Normal rate and regular rhythm.      Heart sounds: No murmur heard.     No friction rub. No gallop.   Pulmonary:      Effort: No respiratory distress.      Breath sounds: Normal breath sounds. No wheezing or rales.   Chest:      Chest wall: No tenderness.   Abdominal:      General: Bowel sounds are normal. There is no distension.      Palpations: Abdomen is soft.      Tenderness: There is no abdominal tenderness.   Musculoskeletal:         General: No tenderness. " Normal range of motion.   Lymphadenopathy:      Cervical: No cervical adenopathy.   Skin:     General: Skin is warm and dry.      Findings: No rash.   Neurological:      Mental Status: He is alert and oriented to person, place, and time.   Psychiatric:         Behavior: Behavior normal.         LABS     Lab Results   Component Value Date    HGBA1C 5.2 10/13/2021     CMP  Sodium   Date Value Ref Range Status   08/17/2023 139 136 - 145 mmol/L Final     Potassium   Date Value Ref Range Status   08/17/2023 4.2 3.5 - 5.1 mmol/L Final     Chloride   Date Value Ref Range Status   08/17/2023 105 95 - 110 mmol/L Final     CO2   Date Value Ref Range Status   08/17/2023 26 23 - 29 mmol/L Final     Glucose   Date Value Ref Range Status   08/17/2023 82 70 - 110 mg/dL Final     BUN   Date Value Ref Range Status   08/17/2023 12 6 - 20 mg/dL Final     Creatinine   Date Value Ref Range Status   08/17/2023 0.9 0.5 - 1.4 mg/dL Final     Calcium   Date Value Ref Range Status   08/17/2023 9.1 8.7 - 10.5 mg/dL Final     Total Protein   Date Value Ref Range Status   08/17/2023 6.9 6.0 - 8.4 g/dL Final     Albumin   Date Value Ref Range Status   08/17/2023 4.3 3.5 - 5.2 g/dL Final     Total Bilirubin   Date Value Ref Range Status   08/17/2023 0.6 0.1 - 1.0 mg/dL Final     Comment:     For infants and newborns, interpretation of results should be based  on gestational age, weight and in agreement with clinical  observations.    Premature Infant recommended reference ranges:  Up to 24 hours.............<8.0 mg/dL  Up to 48 hours............<12.0 mg/dL  3-5 days..................<15.0 mg/dL  6-29 days.................<15.0 mg/dL       Alkaline Phosphatase   Date Value Ref Range Status   08/17/2023 64 55 - 135 U/L Final     AST   Date Value Ref Range Status   08/17/2023 26 10 - 40 U/L Final     ALT   Date Value Ref Range Status   08/17/2023 25 10 - 44 U/L Final     Anion Gap   Date Value Ref Range Status   08/17/2023 8 8 - 16 mmol/L Final      eGFR if    Date Value Ref Range Status   10/13/2021 >60.0 >60 mL/min/1.73 m^2 Final     eGFR if non    Date Value Ref Range Status   10/13/2021 >60.0 >60 mL/min/1.73 m^2 Final     Comment:     Calculation used to obtain the estimated glomerular filtration  rate (eGFR) is the CKD-EPI equation.        Lab Results   Component Value Date    WBC 5.14 08/17/2023    HGB 13.4 (L) 08/17/2023    HCT 41.2 08/17/2023    MCV 87 08/17/2023     08/17/2023     Lab Results   Component Value Date    CHOL 187 08/17/2023    CHOL 214 (H) 10/13/2021    CHOL 189 09/21/2020     Lab Results   Component Value Date    HDL 39 (L) 08/17/2023    HDL 49 10/13/2021    HDL 55 09/21/2020     Lab Results   Component Value Date    LDLCALC 129.6 08/17/2023    LDLCALC 143.2 10/13/2021    LDLCALC 120.6 09/21/2020     Lab Results   Component Value Date    TRIG 92 08/17/2023    TRIG 109 10/13/2021    TRIG 67 09/21/2020     Lab Results   Component Value Date    CHOLHDL 20.9 08/17/2023    CHOLHDL 22.9 10/13/2021    CHOLHDL 29.1 09/21/2020     Lab Results   Component Value Date    TSH 1.313 08/17/2023       ASSESSMENT/PLAN     Dae Amador is a 44 y.o. male    Annual physical exam- All age and gender related screenings discussed   -     CBC Auto Differential; Future; Expected date: 10/08/2024  -     Comprehensive Metabolic Panel; Future; Expected date: 10/08/2024  -     Hemoglobin A1C; Future; Expected date: 10/08/2024  -     Lipid Panel; Future; Expected date: 10/08/2024  -     TSH; Future; Expected date: 10/08/2024  -     Vitamin D; Future; Expected date: 10/08/2024    Anemia of chronic disease- stable. Will repeat CBC   -     CBC Auto Differential; Future; Expected date: 10/08/2024    Vitamin D deficiency- stable. Will cont vit d supplement and check level   -     Vitamin D; Future; Expected date: 10/08/2024    Elevated LFTs- stable. Will repeat LFTs   -     Comprehensive Metabolic Panel; Future; Expected date:  10/08/2024    Abnormal CT of liver- stable. Will monitor LFT     Lung nodule- stable, low risk and will monitor     Episode of transient neurologic symptoms- resolved. Reviewed MRI and MRA       Follow up with PCP    Kathy CROWLEY, DOMINIC, FNP-c   Department of Internal Medicine - [facility] [address]  9:20 AM

## 2024-10-09 DIAGNOSIS — E78.2 ELEVATED TRIGLYCERIDES WITH HIGH CHOLESTEROL: Primary | ICD-10-CM
